# Patient Record
Sex: FEMALE | Race: WHITE | NOT HISPANIC OR LATINO | Employment: OTHER | URBAN - METROPOLITAN AREA
[De-identification: names, ages, dates, MRNs, and addresses within clinical notes are randomized per-mention and may not be internally consistent; named-entity substitution may affect disease eponyms.]

---

## 2017-06-13 ENCOUNTER — DOCUMENTATION ONLY (OUTPATIENT)
Dept: LAB | Facility: CLINIC | Age: 82
End: 2017-06-13

## 2017-06-13 DIAGNOSIS — D50.9 IRON DEFICIENCY ANEMIA, UNSPECIFIED IRON DEFICIENCY ANEMIA TYPE: ICD-10-CM

## 2017-06-13 DIAGNOSIS — I10 ESSENTIAL HYPERTENSION WITH GOAL BLOOD PRESSURE LESS THAN 140/90: ICD-10-CM

## 2017-06-13 DIAGNOSIS — C50.912 MALIGNANT NEOPLASM OF LEFT FEMALE BREAST, UNSPECIFIED SITE OF BREAST: ICD-10-CM

## 2017-06-13 DIAGNOSIS — E78.5 HYPERLIPIDEMIA LDL GOAL <130: ICD-10-CM

## 2017-06-13 DIAGNOSIS — Z00.00 ROUTINE GENERAL MEDICAL EXAMINATION AT A HEALTH CARE FACILITY: Primary | ICD-10-CM

## 2017-06-13 NOTE — PROGRESS NOTES
Please review, associate diagnosis, and sign pending pre physical lab orders for patient's upcoming 6/16/17 lab appointment.     Thank you,  Lab

## 2017-06-16 DIAGNOSIS — E78.5 HYPERLIPIDEMIA LDL GOAL <130: ICD-10-CM

## 2017-06-16 DIAGNOSIS — I10 ESSENTIAL HYPERTENSION WITH GOAL BLOOD PRESSURE LESS THAN 140/90: ICD-10-CM

## 2017-06-16 DIAGNOSIS — D50.9 IRON DEFICIENCY ANEMIA, UNSPECIFIED IRON DEFICIENCY ANEMIA TYPE: ICD-10-CM

## 2017-06-16 DIAGNOSIS — Z00.00 ROUTINE GENERAL MEDICAL EXAMINATION AT A HEALTH CARE FACILITY: ICD-10-CM

## 2017-06-16 DIAGNOSIS — C50.912 MALIGNANT NEOPLASM OF LEFT FEMALE BREAST, UNSPECIFIED SITE OF BREAST: ICD-10-CM

## 2017-06-16 LAB
ALBUMIN SERPL-MCNC: 3.7 G/DL (ref 3.4–5)
ALP SERPL-CCNC: 52 U/L (ref 40–150)
ALT SERPL W P-5'-P-CCNC: 24 U/L (ref 0–50)
ANION GAP SERPL CALCULATED.3IONS-SCNC: 9 MMOL/L (ref 3–14)
AST SERPL W P-5'-P-CCNC: 18 U/L (ref 0–45)
BASOPHILS # BLD AUTO: 0 10E9/L (ref 0–0.2)
BASOPHILS NFR BLD AUTO: 0.8 %
BILIRUB SERPL-MCNC: 0.5 MG/DL (ref 0.2–1.3)
BUN SERPL-MCNC: 12 MG/DL (ref 7–30)
CALCIUM SERPL-MCNC: 9.4 MG/DL (ref 8.5–10.1)
CHLORIDE SERPL-SCNC: 104 MMOL/L (ref 94–109)
CO2 SERPL-SCNC: 28 MMOL/L (ref 20–32)
CREAT SERPL-MCNC: 0.77 MG/DL (ref 0.52–1.04)
DIFFERENTIAL METHOD BLD: NORMAL
EOSINOPHIL # BLD AUTO: 0.5 10E9/L (ref 0–0.7)
EOSINOPHIL NFR BLD AUTO: 10.3 %
ERYTHROCYTE [DISTWIDTH] IN BLOOD BY AUTOMATED COUNT: 13.2 % (ref 10–15)
FERRITIN SERPL-MCNC: 45 NG/ML (ref 8–252)
GFR SERPL CREATININE-BSD FRML MDRD: 72 ML/MIN/1.7M2
GLUCOSE SERPL-MCNC: 102 MG/DL (ref 70–99)
HCT VFR BLD AUTO: 38.2 % (ref 35–47)
HGB BLD-MCNC: 12.2 G/DL (ref 11.7–15.7)
IRON SATN MFR SERPL: 20 % (ref 15–46)
IRON SERPL-MCNC: 55 UG/DL (ref 35–180)
LYMPHOCYTES # BLD AUTO: 1.2 10E9/L (ref 0.8–5.3)
LYMPHOCYTES NFR BLD AUTO: 24.3 %
MCH RBC QN AUTO: 31.1 PG (ref 26.5–33)
MCHC RBC AUTO-ENTMCNC: 31.9 G/DL (ref 31.5–36.5)
MCV RBC AUTO: 97 FL (ref 78–100)
MONOCYTES # BLD AUTO: 0.5 10E9/L (ref 0–1.3)
MONOCYTES NFR BLD AUTO: 10.7 %
NEUTROPHILS # BLD AUTO: 2.7 10E9/L (ref 1.6–8.3)
NEUTROPHILS NFR BLD AUTO: 53.9 %
PLATELET # BLD AUTO: 239 10E9/L (ref 150–450)
POTASSIUM SERPL-SCNC: 4.4 MMOL/L (ref 3.4–5.3)
PROT SERPL-MCNC: 7 G/DL (ref 6.8–8.8)
RBC # BLD AUTO: 3.92 10E12/L (ref 3.8–5.2)
SODIUM SERPL-SCNC: 141 MMOL/L (ref 133–144)
TIBC SERPL-MCNC: 281 UG/DL (ref 240–430)
WBC # BLD AUTO: 5 10E9/L (ref 4–11)

## 2017-06-16 PROCEDURE — 83550 IRON BINDING TEST: CPT | Performed by: INTERNAL MEDICINE

## 2017-06-16 PROCEDURE — 82728 ASSAY OF FERRITIN: CPT | Performed by: INTERNAL MEDICINE

## 2017-06-16 PROCEDURE — 36415 COLL VENOUS BLD VENIPUNCTURE: CPT | Performed by: INTERNAL MEDICINE

## 2017-06-16 PROCEDURE — 83540 ASSAY OF IRON: CPT | Performed by: INTERNAL MEDICINE

## 2017-06-16 PROCEDURE — 80061 LIPID PANEL: CPT | Performed by: INTERNAL MEDICINE

## 2017-06-16 PROCEDURE — 85025 COMPLETE CBC W/AUTO DIFF WBC: CPT | Performed by: INTERNAL MEDICINE

## 2017-06-16 PROCEDURE — 80053 COMPREHEN METABOLIC PANEL: CPT | Performed by: INTERNAL MEDICINE

## 2017-06-19 NOTE — PROGRESS NOTES
SUBJECTIVE:                                                            Aysha Rose is a 81 year old female who presents for Preventive Visit.    The patient feels fine and is working out reg by walking.  She has uri 2 weeks, cold, cough, not prod, no chest pain or shortness of breath, no f,c,s.  No ear pain or s.t but nasal moises, min dc, some facial pressure.    She otherwise feels fine, has been on ppi and iron for franklin found last year, added those then and follow up Sept stool neg for blood, iron fine.  Prior franklin and eval as noted.  NO gi c/o on review of systems.    She is up to date mammogram               Past Medical History:      Past Medical History:   Diagnosis Date     Abdominal pain 9/13    ct abd and pelvis neg     Breast cancer (H) 2007    lumpectomy and xrt [place], [facility] 5 years of arimidex     Erosive gastritis Oct 2011    by egd, colon as above, also small ulcer     High cholesterol      HTN (hypertension)     nl mr renogram     Hx of colonoscopy 2007, 2011    nl, 2011 with cecal angioect and 2mm polyp     FRANKLIN (iron deficiency anaemia) 2011, 2016    colonoscopy cecal angioectasia, egd with hh, small ulcer     Microscopic hematuria 2012    Dr. Alonzo, ct done 11/28/12 negative, cysto neg     Osteopenia 2008    [place], fu 2014 [facility] and [facility] -1.5 left hip     Syncope 2011    neg est echo             Past Surgical History:      Past Surgical History:   Procedure Laterality Date     CATARACT IOL, RT/LT       LUMPECTOMY BREAST  2007             Social History:     Social History     Social History     Marital status:      Spouse name: N/A     Number of children: 4     Years of education: N/A     Occupational History     homemaker      Social History Main Topics     Smoking status: Never Smoker     Smokeless tobacco: Never Used     Alcohol use No     Drug use: No     Sexual activity: Yes     Partners: Male     Other Topics Concern     Not on file     Social History Narrative             Family  "History:   reviewed         Allergies:     Allergies   Allergen Reactions     Penicillins              Medications:     Current Outpatient Prescriptions   Medication Sig Dispense Refill     Ferrous Sulfate (IRON SUPPLEMENT PO)        pantoprazole (PROTONIX) 40 MG enteric coated tablet TAKE 1 TABLET(40 MG) BY MOUTH DAILY 30 TO 60 MINUTES BEFORE A MEAL 30 tablet 6     simvastatin (ZOCOR) 20 MG tablet TAKE 1 TABLET BY MOUTH EVERY DAY IN THE EVENING 90 tablet 3     irbesartan-hydrochlorothiazide (AVALIDE) 150-12.5 MG per tablet Take 1 tablet by mouth daily 90 tablet 3     psyllium (METAMUCIL) 30.9 % POWD Take 3 tsp by mouth daily.       calcium-vitamin D (CALCIUM 600 + D) 600-400 MG-UNIT per tablet Take 3 tablets by mouth daily.       [DISCONTINUED] simvastatin (ZOCOR) 20 MG tablet Take 1 tablet (20 mg) by mouth At Bedtime 90 tablet 3               Review of Systems:   The 10 point Review of Systems is negative other than noted in the HPI           Physical Exam:   Blood pressure 120/67, pulse 74, temperature 97.7  F (36.5  C), temperature source Oral, height 5' 4.5\" (1.638 m), weight 150 lb (68 kg), SpO2 98 %, not currently breastfeeding.    Exam:  Constitutional: healthy appearing, alert and in no distress  Heent: Normocephalic. Head without obvious masses or lesions. PERRLDC, EOMI. Mouth exam within normal limits: tongue, mucous membranes, posterior pharynx all normal, no lesions or abnormalities seen.  Tm's and canals within normal limits bilaterally. Neck supple, no nuchal rigidity or masses. No supraclavicular, or cervical adenopathy. Thyroid symmetric, no masses.  Cardiovascular: Regular rate and rhythm, no murmer, rub or gallops.  JVP not elevated, no edema.  Carotids within normal limits bilaterally, no bruits.  Respiratory: Normal respiratory effort.  Lungs clear, normal flow, no wheezing or crackles.  Breasts: Normal bilaterally.  No masses or lesions.  Nipples within normal limits.  No axillary lesions or " nodes.  Gastrointestinal: Normal active bowel sounds.   Soft, not tender, no masses, guarding or rebound.  No hepatosplenomegaly.   Musculoskeletal: extremities normal, no gross deformities noted.  Skin: no suspicious lesions or rashes   Neurologic: Mental status within normal limits.  Speech fluent.  No gross motor abnormalities and gait intact.  Psychiatric: mentation appears normal and affect normal.         Data:   Labs reviewed with patient         Assessment:   1. Normal cpx  2. Consuelo, no signs malig cause, to stop iron and ppi and follow up labs 4 months  3. Uri, suspect viral, if worsens, fever or not gone soon call for ab  4. Breast ca, les  5. Hypertension, controlled  6. Elevated cholesterol on statin  7. Osteopenia, stable  8. hcm         Plan:   Up to date immunizations  Dc iron and ppi, follow up labs 4 months  Above re uri  Exercise, diet      Agustin Peguero M.D.              Are you in the first 12 months of your Medicare Part B coverage?  No    Healthy Habits:    Do you get at least three servings of calcium containing foods daily (dairy, green leafy vegetables, etc.)? yes    Amount of exercise or daily activities, outside of work: 7 day(s) per week    Problems taking medications regularly No    Medication side effects: No    Have you had an eye exam in the past two years? no    Do you see a dentist twice per year? yes    Do you have sleep apnea, excessive snoring or daytime drowsiness?yes    COGNITIVE SCREEN  1) Repeat 3 items (Banana, Sunrise, Chair)    2) Clock draw:   3) 3 item recall:   Results: 3 items recalled: COGNITIVE IMPAIRMENT LESS LIKELY    Mini-CogTM Copyright S Carlo[employer]. Licensed by the author for use in [facility]; reprinted with permission (horace@.[facility]). All rights reserved.                Reviewed and updated as needed this visit by clinical staff         Reviewed and updated as needed this visit by Provider        Social History   Substance Use Topics     Smoking status:  "Never Smoker     Smokeless tobacco: Never Used     Alcohol use No       The patient does not drink >3 drinks per day nor >7 drinks per week.    Today's PHQ-2 Score:   PHQ-2 ( 1999 Pfizer) 6/17/2016 6/9/2015   Q1: Little interest or pleasure in doing things 0 0   Q2: Feeling down, depressed or hopeless 0 0   PHQ-2 Score 0 0       Do you feel safe in your environment - Yes    Do you have a Health Care Directive?: Yes: Patient states has Advance Directive and will bring in a copy to clinic.    Current providers sharing in care for this patient include:   Patient Care Team:  Agustin Peguero MD as PCP - General (Internal Medicine)      Hearing impairment: No    Ability to successfully perform activities of daily living: Yes, no assistance needed     Fall risk:       Home safety:  none identified      The following health maintenance items are reviewed in Epic and correct as of today:  Health Maintenance   Topic Date Due     FALL RISK ASSESSMENT  06/17/2017     INFLUENZA VACCINE (SYSTEM ASSIGNED)  09/01/2017     ADVANCE DIRECTIVE PLANNING Q5 YRS  06/20/2019     TETANUS IMMUNIZATION (SYSTEM ASSIGNED)  09/18/2022     DEXA SCAN SCREENING (SYSTEM ASSIGNED)  Completed     PNEUMOCOCCAL  Completed            End of Life Planning:  Patient currently has an advanced directive: yes    COUNSELING:  Reviewed preventive health counseling, as reflected in patient instructions       Healthy diet/nutrition       Vision screening        Estimated body mass index is 25.5 kg/(m^2) as calculated from the following:    Height as of 6/17/16: 5' 4.5\" (1.638 m).    Weight as of 6/17/16: 150 lb 14.4 oz (68.4 kg).     reports that she has never smoked. She has never used smokeless tobacco.      Appropriate preventive services were discussed with this patient, including applicable screening as appropriate for cardiovascular disease, diabetes, osteopenia/osteoporosis, and glaucoma.  As appropriate for age/gender, discussed screening for " colorectal cancer, prostate cancer, breast cancer, and cervical cancer. Checklist reviewing preventive services available has been given to the patient.    Reviewed patients plan of care and provided an AVS. The Basic Care Plan (routine screening as documented in Health Maintenance) for Aysha meets the Care Plan requirement. This Care Plan has been established and reviewed with the Patient.    Counseling Resources:  ATP IV Guidelines  Pooled Cohorts Equation Calculator  Breast Cancer Risk Calculator  FRAX Risk Assessment  ICSI Preventive Guidelines  Dietary Guidelines for Americans, 2010  USDA's MyPlate  ASA Prophylaxis  Lung CA Screening    Agustin Peguero MD  [facility]

## 2017-06-19 NOTE — PATIENT INSTRUCTIONS
Stop both the iron and the protonix and make sure to come back for a non fasting lab in 4 months, call before you come.    If your cold worsens or you have a fever or it is not gone over the next 7 days call.    Agustin Peguero M.D.            Preventive Health Recommendations    Female Ages 65 +    Yearly exam:     See your health care provider every year in order to  o Review health changes.   o Discuss preventive care.    o Review your medicines if your doctor has prescribed any.      You no longer need a yearly Pap test unless you've had an abnormal Pap test in the past 10 years. If you have vaginal symptoms, such as bleeding or discharge, be sure to talk with your provider about a Pap test.      Every 1 to 2 years, have a mammogram.  If you are over 69, talk with your health care provider about whether or not you want to continue having screening mammograms.      Every 10 years, have a colonoscopy. Or, have a yearly FIT test (stool test). These exams will check for colon cancer.       Have a cholesterol test every 5 years, or more often if your doctor advises it.       Have a diabetes test (fasting glucose) every three years. If you are at risk for diabetes, you should have this test more often.       At age 65, have a bone density scan (DEXA) to check for osteoporosis (brittle bone disease).    Shots:    Get a flu shot each year.    Get a tetanus shot every 10 years.    Talk to your doctor about your pneumonia vaccines. There are now two you should receive - Pneumovax (PPSV 23) and Prevnar (PCV 13).    Talk to your doctor about the shingles vaccine.    Talk to your doctor about the hepatitis B vaccine.    Nutrition:     Eat at least 5 servings of fruits and vegetables each day.      Eat whole-grain bread, whole-wheat pasta and brown rice instead of white grains and rice.      Talk to your provider about Calcium and Vitamin D.     Lifestyle    Exercise at least 150 minutes a week (30 minutes a day, 5 days a  week). This will help you control your weight and prevent disease.      Limit alcohol to one drink per day.      No smoking.       Wear sunscreen to prevent skin cancer.       See your dentist twice a year for an exam and cleaning.      See your eye doctor every 1 to 2 years to screen for conditions such as glaucoma, macular degeneration and cataracts.

## 2017-06-20 ENCOUNTER — OFFICE VISIT (OUTPATIENT)
Dept: FAMILY MEDICINE | Facility: CLINIC | Age: 82
End: 2017-06-20
Payer: MEDICARE

## 2017-06-20 VITALS
BODY MASS INDEX: 24.99 KG/M2 | HEART RATE: 74 BPM | WEIGHT: 150 LBS | DIASTOLIC BLOOD PRESSURE: 67 MMHG | TEMPERATURE: 97.7 F | OXYGEN SATURATION: 98 % | HEIGHT: 65 IN | SYSTOLIC BLOOD PRESSURE: 120 MMHG

## 2017-06-20 DIAGNOSIS — M85.80 OSTEOPENIA: ICD-10-CM

## 2017-06-20 DIAGNOSIS — C50.912 MALIGNANT NEOPLASM OF LEFT FEMALE BREAST, UNSPECIFIED SITE OF BREAST: ICD-10-CM

## 2017-06-20 DIAGNOSIS — Z00.00 ROUTINE GENERAL MEDICAL EXAMINATION AT A HEALTH CARE FACILITY: Primary | ICD-10-CM

## 2017-06-20 DIAGNOSIS — D50.9 IRON DEFICIENCY ANEMIA, UNSPECIFIED IRON DEFICIENCY ANEMIA TYPE: ICD-10-CM

## 2017-06-20 DIAGNOSIS — E78.5 HYPERLIPIDEMIA LDL GOAL <130: ICD-10-CM

## 2017-06-20 DIAGNOSIS — I10 ESSENTIAL HYPERTENSION WITH GOAL BLOOD PRESSURE LESS THAN 140/90: ICD-10-CM

## 2017-06-20 PROCEDURE — G0439 PPPS, SUBSEQ VISIT: HCPCS | Performed by: INTERNAL MEDICINE

## 2017-06-20 RX ORDER — SIMVASTATIN 20 MG
TABLET ORAL
Qty: 90 TABLET | Refills: 3 | Status: SHIPPED | OUTPATIENT
Start: 2017-06-20 | End: 2018-06-26

## 2017-06-20 RX ORDER — IRBESARTAN AND HYDROCHLOROTHIAZIDE 150; 12.5 MG/1; MG/1
1 TABLET, FILM COATED ORAL DAILY
Qty: 90 TABLET | Refills: 3 | Status: SHIPPED | OUTPATIENT
Start: 2017-06-20 | End: 2017-07-14

## 2017-06-20 NOTE — NURSING NOTE
"Chief Complaint   Patient presents with     Medicare Visit       Initial /67  Pulse 74  Temp 97.7  F (36.5  C) (Oral)  Ht 5' 4.5\" (1.638 m)  Wt 150 lb (68 kg)  SpO2 98%  Breastfeeding? No  BMI 25.35 kg/m2 Estimated body mass index is 25.35 kg/(m^2) as calculated from the following:    Height as of this encounter: 5' 4.5\" (1.638 m).    Weight as of this encounter: 150 lb (68 kg).  Medication Reconciliation: complete   Elida TAYLOR CMA      "

## 2017-06-20 NOTE — MR AVS SNAPSHOT
After Visit Summary   6/20/2017    Aysha Rose    MRN: [id number]           Patient Information     Date Of Birth          1935        Visit Information        Provider Department      6/20/2017 11:00 AM Agustin Peguero MD [facility]        Today's Diagnoses     Routine general medical examination at a health care facility    -  1    Malignant neoplasm of left female breast, unspecified site of breast (H)        Iron deficiency anemia, unspecified iron deficiency anemia type        Osteopenia        Hyperlipidemia LDL goal <130        Essential hypertension with goal blood pressure less than 140/90          Care Instructions    Stop both the iron and the protonix and make sure to come back for a non fasting lab in 4 months, call before you come.    If your cold worsens or you have a fever or it is not gone over the next 7 days call.    Agustin Peguero M.D.            Preventive Health Recommendations    Female Ages 65 +    Yearly exam:     See your health care provider every year in order to  o Review health changes.   o Discuss preventive care.    o Review your medicines if your doctor has prescribed any.      You no longer need a yearly Pap test unless you've had an abnormal Pap test in the past 10 years. If you have vaginal symptoms, such as bleeding or discharge, be sure to talk with your provider about a Pap test.      Every 1 to 2 years, have a mammogram.  If you are over 69, talk with your health care provider about whether or not you want to continue having screening mammograms.      Every 10 years, have a colonoscopy. Or, have a yearly FIT test (stool test). These exams will check for colon cancer.       Have a cholesterol test every 5 years, or more often if your doctor advises it.       Have a diabetes test (fasting glucose) every three years. If you are at risk for diabetes, you should have this test more often.       At age 65, have a bone density scan (DEXA) to  check for osteoporosis (brittle bone disease).    Shots:    Get a flu shot each year.    Get a tetanus shot every 10 years.    Talk to your doctor about your pneumonia vaccines. There are now two you should receive - Pneumovax (PPSV 23) and Prevnar (PCV 13).    Talk to your doctor about the shingles vaccine.    Talk to your doctor about the hepatitis B vaccine.    Nutrition:     Eat at least 5 servings of fruits and vegetables each day.      Eat whole-grain bread, whole-wheat pasta and brown rice instead of white grains and rice.      Talk to your provider about Calcium and Vitamin D.     Lifestyle    Exercise at least 150 minutes a week (30 minutes a day, 5 days a week). This will help you control your weight and prevent disease.      Limit alcohol to one drink per day.      No smoking.       Wear sunscreen to prevent skin cancer.       See your dentist twice a year for an exam and cleaning.      See your eye doctor every 1 to 2 years to screen for conditions such as glaucoma, macular degeneration and cataracts.          Follow-ups after your visit        Future tests that were ordered for you today     Open Future Orders        Priority Expected Expires Ordered    Hemoglobin Routine  12/17/2017 6/20/2017    Ferritin Routine  9/18/2017 6/20/2017    Iron and iron binding capacity Routine  9/18/2017 6/20/2017            Who to contact     If you have questions or need follow up information about today's clinic visit or your schedule please contact [facility] directly at [phone].  Normal or non-critical lab and imaging results will be communicated to you by MyChart, letter or phone within 4 business days after the clinic has received the results. If you do not hear from us within 7 days, please contact the clinic through MyChart or phone. If you have a critical or abnormal lab result, we will notify you by phone as soon as possible.  Submit refill requests through [employer] or call your pharmacy and  "they will forward the refill request to us. Please allow 3 business days for your refill to be completed.          Additional Information About Your Visit        Drikhart Information     [employer] lets you send messages to your doctor, view your test results, renew your prescriptions, schedule appointments and more. To sign up, go to [email or web address] . Click on \"Log in\" on the left side of the screen, which will take you to the Welcome page. Then click on \"Sign up Now\" on the right side of the page.     You will be asked to enter the access code listed below, as well as some personal information. Please follow the directions to create your username and password.     Your access code is: [email or web address]  Expires: 2017 11:02 AM     Your access code will  in 90 days. If you need help or a new code, please call your [facility] clinic or [phone].        Care EveryWhere ID     This is your [facility] EveryWhere ID. This could be used by other organizations to access your [facility] medical records  [id number]        Your Vitals Were     Pulse Temperature Height Pulse Oximetry Breastfeeding? BMI (Body Mass Index)    74 97.7  F (36.5  C) (Oral) 5' 4.5\" (1.638 m) 98% No 25.35 kg/m2       Blood Pressure from Last 3 Encounters:   17 120/67   16 116/56   06/09/15 113/63    Weight from Last 3 Encounters:   17 150 lb (68 kg)   16 150 lb 14.4 oz (68.4 kg)   06/09/15 143 lb 8 oz (65.1 kg)                 Today's Medication Changes          These changes are accurate as of: 17 11:14 AM.  If you have any questions, ask your nurse or doctor.               These medicines have changed or have updated prescriptions.        Dose/Directions    simvastatin 20 MG tablet   Commonly known as:  ZOCOR   This may have changed:  See the new instructions.   Used for:  Hyperlipidemia LDL goal <130   Changed by:  Agustin Peguero MD        TAKE 1 TABLET BY MOUTH EVERY DAY IN THE EVENING   Quantity:  90 " tablet   Refills:  3            Where to get your medicines      These medications were sent to [facility] - [place], [place] - [address] [place] [facility] [address]  [address] [address]     Phone:  [phone]     irbesartan-hydrochlorothiazide 150-12.5 MG per tablet    simvastatin 20 MG tablet                Primary Care Provider Office Phone # Fax #    Agustin Peguero [phone]       [facility] [address]  [place] [address]        Thank you!     Thank you for choosing [facility]  for your care. Our goal is always to provide you with excellent care. Hearing back from our patients is one way we can continue to improve our services. Please take a few minutes to complete the written survey that you may receive in the mail after your visit with us. Thank you!             Your Updated Medication List - Protect others around you: Learn how to safely use, store and throw away your medicines at www.disposemymeds.org.          This list is accurate as of: 6/20/17 11:14 AM.  Always use your most recent med list.                   Brand Name Dispense Instructions for use    calcium 600 + D 600-400 MG-UNIT per tablet   Generic drug:  calcium-vitamin D      Take 3 tablets by mouth daily.       irbesartan-hydrochlorothiazide 150-12.5 MG per tablet    AVALIDE    90 tablet    Take 1 tablet by mouth daily       IRON SUPPLEMENT PO          METAMUCIL 30.9 % Powd   Generic drug:  psyllium      Take 3 tsp by mouth daily.       pantoprazole 40 MG EC tablet    PROTONIX    30 tablet    TAKE 1 TABLET(40 MG) BY MOUTH DAILY 30 TO 60 MINUTES BEFORE A MEAL       simvastatin 20 MG tablet    ZOCOR    90 tablet    TAKE 1 TABLET BY MOUTH EVERY DAY IN THE EVENING

## 2017-06-21 DIAGNOSIS — K29.60 EROSIVE GASTRITIS: ICD-10-CM

## 2017-06-21 RX ORDER — PANTOPRAZOLE SODIUM 40 MG/1
TABLET, DELAYED RELEASE ORAL
Qty: 90 TABLET | Refills: 3 | Status: SHIPPED | OUTPATIENT
Start: 2017-06-21 | End: 2017-09-13

## 2017-06-21 NOTE — TELEPHONE ENCOUNTER
Prescription approved per [facility] Refill [facility].  Jessica Newberry RN

## 2017-06-29 LAB
CHOLEST SERPL-MCNC: 165 MG/DL
HDLC SERPL-MCNC: 66 MG/DL
LDLC SERPL CALC-MCNC: 86 MG/DL (ref 0–130)
NONHDLC SERPL-MCNC: 99 MG/DL
TRIGL SERPL-MCNC: 67 MG/DL

## 2017-07-10 DIAGNOSIS — I10 ESSENTIAL HYPERTENSION WITH GOAL BLOOD PRESSURE LESS THAN 140/90: ICD-10-CM

## 2017-07-11 RX ORDER — IRBESARTAN AND HYDROCHLOROTHIAZIDE 150; 12.5 MG/1; MG/1
TABLET, FILM COATED ORAL
Qty: 90 TABLET | Refills: 0 | OUTPATIENT
Start: 2017-07-11

## 2017-07-14 ENCOUNTER — TELEPHONE (OUTPATIENT)
Dept: FAMILY MEDICINE | Facility: CLINIC | Age: 82
End: 2017-07-14

## 2017-07-14 DIAGNOSIS — I10 ESSENTIAL HYPERTENSION WITH GOAL BLOOD PRESSURE LESS THAN 140/90: ICD-10-CM

## 2017-07-14 RX ORDER — IRBESARTAN AND HYDROCHLOROTHIAZIDE 150; 12.5 MG/1; MG/1
1 TABLET, FILM COATED ORAL DAILY
Qty: 90 TABLET | Refills: 3 | Status: SHIPPED | OUTPATIENT
Start: 2017-07-14 | End: 2018-06-26

## 2017-07-14 NOTE — TELEPHONE ENCOUNTER
Reason for Call:  Other prescription    Detailed comments: [facility] is calling stating that they never recived a prescription that was sent 6/20/17. Pt is there now trying to , but there is nothing in their system.   irbesartan-hydrochlorothiazide (AVALIDE) 150-12.5 MG per tablet 90 tablet   [facility] fax # is [phone]      Call taken on 7/14/2017 at 9:48 AM by Juliet Jackson

## 2017-09-13 ENCOUNTER — OFFICE VISIT (OUTPATIENT)
Dept: FAMILY MEDICINE | Facility: CLINIC | Age: 82
End: 2017-09-13
Payer: MEDICARE

## 2017-09-13 VITALS
TEMPERATURE: 98.8 F | HEIGHT: 65 IN | HEART RATE: 76 BPM | RESPIRATION RATE: 16 BRPM | DIASTOLIC BLOOD PRESSURE: 67 MMHG | BODY MASS INDEX: 22.16 KG/M2 | SYSTOLIC BLOOD PRESSURE: 130 MMHG | WEIGHT: 133 LBS | OXYGEN SATURATION: 98 %

## 2017-09-13 DIAGNOSIS — H61.22 IMPACTED CERUMEN OF LEFT EAR: Primary | ICD-10-CM

## 2017-09-13 PROCEDURE — 99212 OFFICE O/P EST SF 10 MIN: CPT | Mod: 25 | Performed by: NURSE PRACTITIONER

## 2017-09-13 PROCEDURE — 69209 REMOVE IMPACTED EAR WAX UNI: CPT | Performed by: NURSE PRACTITIONER

## 2017-09-13 NOTE — NURSING NOTE
Aysha Rose is a 81 year old female who presents today for Ear Wash. with the complaint of wax.    Ear exam showing wax occlusion in the left ear.  hydrogen peroxide/warm water mixture drops were placed in left ear(s) and let soak for 1 minutes.  The patients ear(s) were irrigated using a syringe with mild amount of wax extracted.  Patient tolerated procedure well.    Patient instructed to irrigate ears with warm water daily.    Outcome:Ear Clear

## 2017-09-13 NOTE — NURSING NOTE
"Chief Complaint   Patient presents with     Cerumen Impaction     Left ear-No pain but did try Qtip       Initial /67 (BP Location: Right arm, Patient Position: Chair, Cuff Size: Adult Small)  Pulse 76  Temp 98.8  F (37.1  C) (Tympanic)  Resp 16  Ht 5' 4.5\" (1.638 m)  Wt 133 lb (60.3 kg)  SpO2 98%  BMI 22.48 kg/m2 Estimated body mass index is 22.48 kg/(m^2) as calculated from the following:    Height as of this encounter: 5' 4.5\" (1.638 m).    Weight as of this encounter: 133 lb (60.3 kg).  Medication Reconciliation: complete   Elle Vanessa CMA (AAMA)      "

## 2017-09-13 NOTE — MR AVS SNAPSHOT
"              After Visit Summary   2017    Aysha Rose    MRN: [id number]           Patient Information     Date Of Birth          1935        Visit Information        Provider Department      2017 2:00 PM Genesis Amaral APRN CNP [facility]        Today's Diagnoses     Impacted cerumen of left ear    -  1       Follow-ups after your visit        Who to contact     If you have questions or need follow up information about today's clinic visit or your schedule please contact [facility] directly at [phone].  Normal or non-critical lab and imaging results will be communicated to you by [employer], letter or phone within 4 business days after the clinic has received the results. If you do not hear from us within 7 days, please contact the clinic through [employer] or phone. If you have a critical or abnormal lab result, we will notify you by phone as soon as possible.  Submit refill requests through [employer] or call your pharmacy and they will forward the refill request to us. Please allow 3 business days for your refill to be completed.          Additional Information About Your Visit        MyChart Information     [employer] lets you send messages to your doctor, view your test results, renew your prescriptions, schedule appointments and more. To sign up, go to [email or web address] . Click on \"Log in\" on the left side of the screen, which will take you to the Welcome page. Then click on \"Sign up Now\" on the right side of the page.     You will be asked to enter the access code listed below, as well as some personal information. Please follow the directions to create your username and password.     Your access code is: [email or web address]  Expires: 2017  8:33 AM     Your access code will  in 90 days. If you need help or a new code, please call your [facility] or [phone].        Care EveryWhere ID     This is your Care EveryWhere ID. This could be used by " "other organizations to access your [place] medical records  [id number]        Your Vitals Were     Pulse Temperature Respirations Height Pulse Oximetry BMI (Body Mass Index)    76 98.8  F (37.1  C) (Tympanic) 16 5' 4.5\" (1.638 m) 98% 22.48 kg/m2       Blood Pressure from Last 3 Encounters:   09/13/17 130/67   06/20/17 120/67   06/17/16 116/56    Weight from Last 3 Encounters:   09/13/17 133 lb (60.3 kg)   06/20/17 150 lb (68 kg)   06/17/16 150 lb 14.4 oz (68.4 kg)              We Performed the Following     HC REMOVAL IMPACTED CERUMEN IRRIGATION/LVG UNILAT          Today's Medication Changes          These changes are accurate as of: 9/13/17 11:59 PM.  If you have any questions, ask your nurse or doctor.               Stop taking these medicines if you haven't already. Please contact your care team if you have questions.     IRON SUPPLEMENT PO   Stopped by:  Genesis Amaral APRN CNP           pantoprazole 40 MG EC tablet   Commonly known as:  PROTONIX   Stopped by:  Genesis Amaral APRN CNP                    Primary Care Provider Office Phone # Fax #    Agustin Elie Peguero [phone] [address] [address]        Equal Access to Services     [facility]: Hadii whitney mcintyre hadmayelino Sovadim, waaxda luqadaha, qaybta kaalmada yeny, florentin neff . So [facility] [phone].    ATENCIÓN: Si habla español, tiene a john disposición servicios gratuitos de asistencia lingüística. Jacoby al [phone].    We comply with applicable federal civil rights laws and [place] laws. We do not discriminate on the basis of race, color, national origin, age, disability sex, sexual orientation or gender identity.            Thank you!     Thank you for choosing [facility]  for your care. Our goal is always to provide you with excellent care. Hearing back from our patients is one way we can continue to improve our services. Please take a few " minutes to complete the written survey that you may receive in the mail after your visit with us. Thank you!             Your Updated Medication List - Protect others around you: Learn how to safely use, store and throw away your medicines at www.disposemymeds.org.          This list is accurate as of: 9/13/17 11:59 PM.  Always use your most recent med list.                   Brand Name Dispense Instructions for use Diagnosis    calcium 600 + D 600-400 MG-UNIT per tablet   Generic drug:  calcium-vitamin D      Take 3 tablets by mouth daily.        irbesartan-hydrochlorothiazide 150-12.5 MG per tablet    AVALIDE    90 tablet    Take 1 tablet by mouth daily    Essential hypertension with goal blood pressure less than 140/90       METAMUCIL 30.9 % Powd   Generic drug:  psyllium      Take 3 tsp by mouth daily.        simvastatin 20 MG tablet    ZOCOR    90 tablet    TAKE 1 TABLET BY MOUTH EVERY DAY IN THE EVENING    Hyperlipidemia LDL goal <130

## 2017-10-02 DIAGNOSIS — D50.9 IRON DEFICIENCY ANEMIA, UNSPECIFIED IRON DEFICIENCY ANEMIA TYPE: ICD-10-CM

## 2017-10-02 LAB
FERRITIN SERPL-MCNC: 8 NG/ML (ref 8–252)
HGB BLD-MCNC: 12.3 G/DL (ref 11.7–15.7)
IRON SATN MFR SERPL: 19 % (ref 15–46)
IRON SERPL-MCNC: 66 UG/DL (ref 35–180)
TIBC SERPL-MCNC: 354 UG/DL (ref 240–430)

## 2017-10-02 PROCEDURE — 83550 IRON BINDING TEST: CPT | Performed by: INTERNAL MEDICINE

## 2017-10-02 PROCEDURE — 82728 ASSAY OF FERRITIN: CPT | Performed by: INTERNAL MEDICINE

## 2017-10-02 PROCEDURE — 83540 ASSAY OF IRON: CPT | Performed by: INTERNAL MEDICINE

## 2017-10-02 PROCEDURE — 36415 COLL VENOUS BLD VENIPUNCTURE: CPT | Performed by: INTERNAL MEDICINE

## 2017-10-02 PROCEDURE — 85018 HEMOGLOBIN: CPT | Performed by: INTERNAL MEDICINE

## 2017-10-02 NOTE — LETTER
[facility]  [address] [facility]  [address]  [place]  [address]  Tel: [phone]    October 3, 2017    Aysha YUNI Rose  [address] [facility] [address]        Dear Ms. Rose,    Your iron levels have dropped a bit off of the iron.  I would like you to go back on it as you were before.  If you are having any gi symptoms such as stomach or bowel problems let me know.  Please then come back in 3 months for a repeat non fasting lab.    If you have any further questions or problems, please contact our office.      Sincerely,    Agustin Peguero MD/ Elida TAYLOR, CMA  Results for orders placed or performed in visit on 10/02/17   Hemoglobin   Result Value Ref Range    Hemoglobin 12.3 11.7 - 15.7 g/dL   Ferritin   Result Value Ref Range    Ferritin 8 8 - 252 ng/mL   Iron and iron binding capacity   Result Value Ref Range    Iron 66 35 - 180 ug/dL    Iron Binding Cap 354 240 - 430 ug/dL    Iron Saturation Index 19 15 - 46 %               Enclosure: Lab Results

## 2017-10-03 DIAGNOSIS — D50.9 IRON DEFICIENCY ANEMIA, UNSPECIFIED IRON DEFICIENCY ANEMIA TYPE: Primary | ICD-10-CM

## 2017-10-03 NOTE — PROGRESS NOTES
Your iron levels have dropped a bit off of the iron.  I would like you to go back on it as you were before.  If you are having any gi symptoms such as stomach or bowel problems let me know.  Please then come back in 3 months for a repeat non fasting lab.      If you have any questions please call me.

## 2018-01-31 DIAGNOSIS — D50.9 IRON DEFICIENCY ANEMIA, UNSPECIFIED IRON DEFICIENCY ANEMIA TYPE: ICD-10-CM

## 2018-01-31 LAB — HGB BLD-MCNC: 13.2 G/DL (ref 11.7–15.7)

## 2018-01-31 PROCEDURE — 36415 COLL VENOUS BLD VENIPUNCTURE: CPT | Performed by: INTERNAL MEDICINE

## 2018-01-31 PROCEDURE — 85018 HEMOGLOBIN: CPT | Performed by: INTERNAL MEDICINE

## 2018-06-19 DIAGNOSIS — Z00.00 ROUTINE GENERAL MEDICAL EXAMINATION AT A HEALTH CARE FACILITY: ICD-10-CM

## 2018-06-19 DIAGNOSIS — I10 ESSENTIAL HYPERTENSION WITH GOAL BLOOD PRESSURE LESS THAN 140/90: ICD-10-CM

## 2018-06-19 DIAGNOSIS — C50.912 MALIGNANT NEOPLASM OF LEFT FEMALE BREAST, UNSPECIFIED ESTROGEN RECEPTOR STATUS, UNSPECIFIED SITE OF BREAST (H): ICD-10-CM

## 2018-06-19 DIAGNOSIS — D50.9 IRON DEFICIENCY ANEMIA, UNSPECIFIED IRON DEFICIENCY ANEMIA TYPE: ICD-10-CM

## 2018-06-19 LAB
ALBUMIN SERPL-MCNC: 3.5 G/DL (ref 3.4–5)
ALP SERPL-CCNC: 42 U/L (ref 40–150)
ALT SERPL W P-5'-P-CCNC: 25 U/L (ref 0–50)
ANION GAP SERPL CALCULATED.3IONS-SCNC: 6 MMOL/L (ref 3–14)
AST SERPL W P-5'-P-CCNC: 16 U/L (ref 0–45)
BILIRUB SERPL-MCNC: 0.6 MG/DL (ref 0.2–1.3)
BUN SERPL-MCNC: 19 MG/DL (ref 7–30)
CALCIUM SERPL-MCNC: 9.9 MG/DL (ref 8.5–10.1)
CHLORIDE SERPL-SCNC: 106 MMOL/L (ref 94–109)
CHOLEST SERPL-MCNC: 177 MG/DL
CO2 SERPL-SCNC: 31 MMOL/L (ref 20–32)
CREAT SERPL-MCNC: 0.76 MG/DL (ref 0.52–1.04)
ERYTHROCYTE [DISTWIDTH] IN BLOOD BY AUTOMATED COUNT: 12.7 % (ref 10–15)
FERRITIN SERPL-MCNC: 38 NG/ML (ref 8–252)
GFR SERPL CREATININE-BSD FRML MDRD: 72 ML/MIN/1.7M2
GLUCOSE SERPL-MCNC: 87 MG/DL (ref 70–99)
HCT VFR BLD AUTO: 41.1 % (ref 35–47)
HDLC SERPL-MCNC: 58 MG/DL
HGB BLD-MCNC: 13.4 G/DL (ref 11.7–15.7)
IRON SATN MFR SERPL: 43 % (ref 15–46)
IRON SERPL-MCNC: 132 UG/DL (ref 35–180)
LDLC SERPL CALC-MCNC: 99 MG/DL
MCH RBC QN AUTO: 32.3 PG (ref 26.5–33)
MCHC RBC AUTO-ENTMCNC: 32.6 G/DL (ref 31.5–36.5)
MCV RBC AUTO: 99 FL (ref 78–100)
NONHDLC SERPL-MCNC: 119 MG/DL
PLATELET # BLD AUTO: 220 10E9/L (ref 150–450)
POTASSIUM SERPL-SCNC: 4.1 MMOL/L (ref 3.4–5.3)
PROT SERPL-MCNC: 6.8 G/DL (ref 6.8–8.8)
RBC # BLD AUTO: 4.15 10E12/L (ref 3.8–5.2)
SODIUM SERPL-SCNC: 143 MMOL/L (ref 133–144)
TIBC SERPL-MCNC: 304 UG/DL (ref 240–430)
TRIGL SERPL-MCNC: 102 MG/DL
WBC # BLD AUTO: 5.2 10E9/L (ref 4–11)

## 2018-06-19 PROCEDURE — 36415 COLL VENOUS BLD VENIPUNCTURE: CPT | Performed by: INTERNAL MEDICINE

## 2018-06-19 PROCEDURE — 80061 LIPID PANEL: CPT | Performed by: INTERNAL MEDICINE

## 2018-06-19 PROCEDURE — 80053 COMPREHEN METABOLIC PANEL: CPT | Performed by: INTERNAL MEDICINE

## 2018-06-19 PROCEDURE — 83540 ASSAY OF IRON: CPT | Performed by: INTERNAL MEDICINE

## 2018-06-19 PROCEDURE — 82728 ASSAY OF FERRITIN: CPT | Performed by: INTERNAL MEDICINE

## 2018-06-19 PROCEDURE — 83550 IRON BINDING TEST: CPT | Performed by: INTERNAL MEDICINE

## 2018-06-19 PROCEDURE — 85027 COMPLETE CBC AUTOMATED: CPT | Performed by: INTERNAL MEDICINE

## 2018-06-26 ENCOUNTER — OFFICE VISIT (OUTPATIENT)
Dept: FAMILY MEDICINE | Facility: CLINIC | Age: 83
End: 2018-06-26
Payer: MEDICARE

## 2018-06-26 ENCOUNTER — HOSPITAL ENCOUNTER (OUTPATIENT)
Dept: BONE DENSITY | Facility: CLINIC | Age: 83
Discharge: HOME | End: 2018-06-26
Attending: INTERNAL MEDICINE | Admitting: INTERNAL MEDICINE
Payer: MEDICARE

## 2018-06-26 VITALS
OXYGEN SATURATION: 96 % | HEIGHT: 65 IN | HEART RATE: 68 BPM | SYSTOLIC BLOOD PRESSURE: 129 MMHG | WEIGHT: 133 LBS | DIASTOLIC BLOOD PRESSURE: 67 MMHG | TEMPERATURE: 97.8 F | BODY MASS INDEX: 22.16 KG/M2

## 2018-06-26 DIAGNOSIS — Z00.00 ROUTINE GENERAL MEDICAL EXAMINATION AT A HEALTH CARE FACILITY: Primary | ICD-10-CM

## 2018-06-26 DIAGNOSIS — C50.912 MALIGNANT NEOPLASM OF LEFT FEMALE BREAST, UNSPECIFIED ESTROGEN RECEPTOR STATUS, UNSPECIFIED SITE OF BREAST (H): ICD-10-CM

## 2018-06-26 DIAGNOSIS — M85.80 OSTEOPENIA, UNSPECIFIED LOCATION: ICD-10-CM

## 2018-06-26 DIAGNOSIS — Z78.0 POSTMENOPAUSAL STATUS: ICD-10-CM

## 2018-06-26 DIAGNOSIS — E78.5 HYPERLIPIDEMIA LDL GOAL <130: ICD-10-CM

## 2018-06-26 DIAGNOSIS — I10 ESSENTIAL HYPERTENSION WITH GOAL BLOOD PRESSURE LESS THAN 140/90: ICD-10-CM

## 2018-06-26 DIAGNOSIS — K29.60 EROSIVE GASTRITIS: ICD-10-CM

## 2018-06-26 DIAGNOSIS — D50.9 IRON DEFICIENCY ANEMIA, UNSPECIFIED IRON DEFICIENCY ANEMIA TYPE: ICD-10-CM

## 2018-06-26 PROCEDURE — G0439 PPPS, SUBSEQ VISIT: HCPCS | Performed by: INTERNAL MEDICINE

## 2018-06-26 PROCEDURE — 77080 DXA BONE DENSITY AXIAL: CPT

## 2018-06-26 RX ORDER — FERROUS SULFATE 325(65) MG
325 TABLET ORAL EVERY OTHER DAY
Status: ON HOLD | COMMUNITY
End: 2019-03-14

## 2018-06-26 RX ORDER — SIMVASTATIN 20 MG
TABLET ORAL
Qty: 90 TABLET | Refills: 3 | Status: SHIPPED | OUTPATIENT
Start: 2018-06-26 | End: 2019-07-05

## 2018-06-26 RX ORDER — IRBESARTAN AND HYDROCHLOROTHIAZIDE 150; 12.5 MG/1; MG/1
1 TABLET, FILM COATED ORAL DAILY
Qty: 90 TABLET | Refills: 3 | Status: SHIPPED | OUTPATIENT
Start: 2018-06-26 | End: 2019-07-01

## 2018-06-26 NOTE — PROGRESS NOTES
SUBJECTIVE:   Aysha Rose is a 82 year old female who presents for Preventive Visit.    She is doing well and works out reg.  Just at [facility] for exam and mammogram and neg, les.  NO c/o.               Past Medical History:      Past Medical History:   Diagnosis Date     Abdominal pain 9/13    ct abd and pelvis neg     Breast cancer (H) 2007    lumpectomy and xrt [facility], got 5 years of arimidex     Erosive gastritis Oct 2011    by egd, colon as above, also small ulcer     High cholesterol      HTN (hypertension)     nl mr renogram     Hx of colonoscopy 2007, 2011    nl, 2011 with cecal angioect and 2mm polyp     FRANKLIN (iron deficiency anaemia) 2011, 2016 2011 colonoscopy cecal angioectasia, egd with hh, small ulcer     Microscopic hematuria 2012    Dr. Alonzo, ct done 11/28/12 negative, cysto neg     Osteopenia 2008    [facility],  [facility] -1.5 left hip     Syncope 2011    neg est echo             Past Surgical History:      Past Surgical History:   Procedure Laterality Date     CATARACT IOL, RT/LT       LUMPECTOMY BREAST  2007             Social History:     Social History     Social History     Marital status:      Spouse name: N/A     Number of children: 4     Years of education: N/A     Occupational History     homemaker      Social History Main Topics     Smoking status: Never Smoker     Smokeless tobacco: Never Used     Alcohol use No     Drug use: No     Sexual activity: Yes     Partners: Male     Other Topics Concern     Not on file     Social History Narrative             Family History:   reviewed         Allergies:     Allergies   Allergen Reactions     Penicillins              Medications:     Current Outpatient Prescriptions   Medication Sig Dispense Refill     Ascorbic Acid (VITAMIN C PO)        calcium-vitamin D (CALCIUM 600 + D) 600-400 MG-UNIT per tablet Take 3 tablets by mouth daily.       ferrous sulfate (IRON) 325 (65 Fe) MG tablet Take 325 mg by mouth every other day         "irbesartan-hydrochlorothiazide (AVALIDE) 150-12.5 MG per tablet Take 1 tablet by mouth daily 90 tablet 3     psyllium (METAMUCIL) 30.9 % POWD Take 3 tsp by mouth daily.       simvastatin (ZOCOR) 20 MG tablet TAKE 1 TABLET BY MOUTH EVERY DAY IN THE EVENING 90 tablet 3     [DISCONTINUED] irbesartan-hydrochlorothiazide (AVALIDE) 150-12.5 MG per tablet Take 1 tablet by mouth daily 90 tablet 3     [DISCONTINUED] simvastatin (ZOCOR) 20 MG tablet TAKE 1 TABLET BY MOUTH EVERY DAY IN THE EVENING 90 tablet 3               Review of Systems:   The 10 point Review of Systems is negative other than noted in the HPI           Physical Exam:   Blood pressure 129/67, pulse 68, temperature 97.8  F (36.6  C), temperature source Oral, height 5' 4.5\" (1.638 m), weight 133 lb (60.3 kg), SpO2 96 %, not currently breastfeeding.    Exam:  Constitutional: healthy appearing, alert and in no distress  Heent: Normocephalic. Head without obvious masses or lesions. PERRLDC, EOMI. Mouth exam within normal limits: tongue, mucous membranes, posterior pharynx all normal, no lesions or abnormalities seen.  Tm's and canals within normal limits bilaterally. Neck supple, no nuchal rigidity or masses. No supraclavicular, or cervical adenopathy. Thyroid symmetric, no masses.  Cardiovascular: Regular rate and rhythm, no murmer, rub or gallops.  JVP not elevated, no edema.  Carotids within normal limits bilaterally, no bruits.  Respiratory: Normal respiratory effort.  Lungs clear, normal flow, no wheezing or crackles.  Gastrointestinal: Normal active bowel sounds.   Soft, not tender, no masses, guarding or rebound.  No hepatosplenomegaly.   Musculoskeletal: extremities normal, no gross deformities noted.  Skin: no suspicious lesions or rashes   Neurologic: Mental status within normal limits.  Speech fluent.  No gross motor abnormalities and gait intact.  Psychiatric: mentation appears normal and affect normal.         Data:   Labs reviewed with patient      "    Assessment:   1. Normal complete physical exam  2. Breast ca, les  3. Consuelo, no issues, cont qod iron  4. Elevated cholesterol on statin  5. Hypertension, controlled  6. Gastritis, no gi c/o  7. Osteopenia, to get dexa  8. hcm         Plan:   shinrix at pharm  dexa  Exercise, diet  Call if problems      Agustin Peguero M.D.                Are you in the first 12 months of your Medicare Part B coverage?  No    Healthy Habits:    Do you get at least three servings of calcium containing foods daily (dairy, green leafy vegetables, etc.)? yes    Amount of exercise or daily activities, outside of work: 6 day(s) per week    Problems taking medications regularly No    Medication side effects: No    Have you had an eye exam in the past two years? yes    Do you see a dentist twice per year? yes    Do you have sleep apnea, excessive snoring or daytime drowsiness?no      Ability to successfully perform activities of daily living: Yes, no assistance needed    Home safety:  none identified     Hearing impairment: No    Fall risk:           COGNITIVE SCREEN  1) Repeat 3 items (Leader, Season, Table)    2) Clock draw:   3) 3 item recall: Recalls 3 objects  Results: 3 items recalled: COGNITIVE IMPAIRMENT LESS LIKELY    Mini-CogTM Copyright S Carloz. Licensed by the author for use in [facility]; reprinted with permission (soob@[facility]). All rights reserved.                Reviewed and updated as needed this visit by clinical staff         Reviewed and updated as needed this visit by Provider        Social History   Substance Use Topics     Smoking status: Never Smoker     Smokeless tobacco: Never Used     Alcohol use No       If you drink alcohol do you typically have >3 drinks per day or >7 drinks per week? No                        Today's PHQ-2 Score:   PHQ-2 ( 1999 Pfizer) 6/20/2017 6/17/2016   Q1: Little interest or pleasure in doing things 0 0   Q2: Feeling down, depressed or hopeless 0 0   PHQ-2 Score 0 0       Do  "you feel safe in your environment - Yes    Do you have a Health Care Directive?: Yes: Patient states has Advance Directive and will bring in a copy to clinic.    Current providers sharing in care for this patient include:   Patient Care Team:  Agustin Peguero MD as PCP - General (Internal Medicine)    The following health maintenance items are reviewed in Epic and correct as of today:  Health Maintenance   Topic Date Due     FALL RISK ASSESSMENT  06/20/2018     PHQ-2 Q1 YR  06/20/2018     INFLUENZA VACCINE (Season Ended) 09/01/2018     ADVANCE DIRECTIVE PLANNING Q5 YRS  06/20/2019     TETANUS IMMUNIZATION (SYSTEM ASSIGNED)  09/18/2022     DEXA SCAN SCREENING (SYSTEM ASSIGNED)  Completed     PNEUMOCOCCAL  Completed       End of Life Planning:  Patient currently has an advanced directive: yes    COUNSELING:  Reviewed preventive health counseling, as reflected in patient instructions       Regular exercise       Healthy diet/nutrition    BP Readings from Last 1 Encounters:   09/13/17 130/67     Estimated body mass index is 22.48 kg/(m^2) as calculated from the following:    Height as of 9/13/17: 5' 4.5\" (1.638 m).    Weight as of 9/13/17: 133 lb (60.3 kg).           reports that she has never smoked. She has never used smokeless tobacco.      Appropriate preventive services were discussed with this patient, including applicable screening as appropriate for cardiovascular disease, diabetes, osteopenia/osteoporosis, and glaucoma.  As appropriate for age/gender, discussed screening for colorectal cancer, prostate cancer, breast cancer, and cervical cancer. Checklist reviewing preventive services available has been given to the patient.    Reviewed patients plan of care and provided an AVS. The Basic Care Plan (routine screening as documented in Health Maintenance) for Aysha meets the Care Plan requirement. This Care Plan has been established and reviewed with the Patient.    Counseling Resources:  ATP IV " Guidelines  Pooled Cohorts Equation Calculator  Breast Cancer Risk Calculator  FRAX Risk Assessment  ICSI Preventive Guidelines  Dietary Guidelines for Americans, 2010  YASSSU's MyPlate  ASA Prophylaxis  Lung CA Screening    Agustin Peguero MD  [facility]

## 2018-06-26 NOTE — MR AVS SNAPSHOT
After Visit Summary   6/26/2018    Aysha Rose    MRN: [id number]           Patient Information     Date Of Birth          1935        Visit Information        Provider Department      6/26/2018 10:30 AM Agustin Peguero MD [facility]        Today's Diagnoses     Routine general medical examination at a health care facility    -  1    Malignant neoplasm of left female breast, unspecified estrogen receptor status, unspecified site of breast (H)        Essential hypertension with goal blood pressure less than 140/90        Hyperlipidemia LDL goal <130        Iron deficiency anemia, unspecified iron deficiency anemia type        Erosive gastritis        Osteopenia, unspecified location          Care Instructions    I would get the new shingles shot called shingrix at your pharmacy    Agustin Peguero M.D.              Preventive Health Recommendations    Female Ages 65 +    Yearly exam:     See your health care provider every year in order to  o Review health changes.   o Discuss preventive care.    o Review your medicines if your doctor has prescribed any.      You no longer need a yearly Pap test unless you've had an abnormal Pap test in the past 10 years. If you have vaginal symptoms, such as bleeding or discharge, be sure to talk with your provider about a Pap test.      Every 1 to 2 years, have a mammogram.  If you are over 69, talk with your health care provider about whether or not you want to continue having screening mammograms.      Every 10 years, have a colonoscopy. Or, have a yearly FIT test (stool test). These exams will check for colon cancer.       Have a cholesterol test every 5 years, or more often if your doctor advises it.       Have a diabetes test (fasting glucose) every three years. If you are at risk for diabetes, you should have this test more often.       At age 65, have a bone density scan (DEXA) to check for osteoporosis (brittle bone  disease).    Shots:    Get a flu shot each year.    Get a tetanus shot every 10 years.    Talk to your doctor about your pneumonia vaccines. There are now two you should receive - Pneumovax (PPSV 23) and Prevnar (PCV 13).    Talk to your pharmacist about the shingles vaccine.    Talk to your doctor about the hepatitis B vaccine.    Nutrition:     Eat at least 5 servings of fruits and vegetables each day.      Eat whole-grain bread, whole-wheat pasta and brown rice instead of white grains and rice.      Get adequate Calcium and Vitamin D.     Lifestyle    Exercise at least 150 minutes a week (30 minutes a day, 5 days a week). This will help you control your weight and prevent disease.      Limit alcohol to one drink per day.      No smoking.       Wear sunscreen to prevent skin cancer.       See your dentist twice a year for an exam and cleaning.      See your eye doctor every 1 to 2 years to screen for conditions such as glaucoma, macular degeneration and cataracts.          Follow-ups after your visit        Who to contact     If you have questions or need follow up information about today's clinic visit or your schedule please contact [facility] directly at [phone].  Normal or non-critical lab and imaging results will be communicated to you by MyChart, letter or phone within 4 business days after the clinic has received the results. If you do not hear from us within 7 days, please contact the clinic through MyChart or phone. If you have a critical or abnormal lab result, we will notify you by phone as soon as possible.  Submit refill requests through [employer] or call your pharmacy and they will forward the refill request to us. Please allow 3 business days for your refill to be completed.          Additional Information About Your Visit        Care EveryWhere ID     This is your Care EveryWhere ID. This could be used by other organizations to access your [place] medical records  [id number]       "  Your Vitals Were     Pulse Temperature Height Pulse Oximetry Breastfeeding? BMI (Body Mass Index)    68 97.8  F (36.6  C) (Oral) 5' 4.5\" (1.638 m) 96% No 22.48 kg/m2       Blood Pressure from Last 3 Encounters:   06/26/18 129/67   09/13/17 130/67   06/20/17 120/67    Weight from Last 3 Encounters:   06/26/18 133 lb (60.3 kg)   09/13/17 133 lb (60.3 kg)   06/20/17 150 lb (68 kg)              Today, you had the following     No orders found for display       Primary Care Provider Office Phone # Fax #    Agustin Peguero [phone] [address] [facility] [address]  [facility] [address]        Equal Access to Services     [facility] : Hadii whitney donaldo Sovadim, waaxda luqadaha, qaybta kaalmada yeny, florentin neff . So [facility] [phone].    ATENCIÓN: Si habla español, tiene a john disposición servicios gratuitos de asistencia lingüística. Jacoby al [phone].    We comply with applicable federal civil rights laws and [place] laws. We do not discriminate on the basis of race, color, national origin, age, disability, sex, sexual orientation, or gender identity.            Thank you!     Thank you for choosing [facility]  for your care. Our goal is always to provide you with excellent care. Hearing back from our patients is one way we can continue to improve our services. Please take a few minutes to complete the written survey that you may receive in the mail after your visit with us. Thank you!             Your Updated Medication List - Protect others around you: Learn how to safely use, store and throw away your medicines at www.disposemymeds.org.          This list is accurate as of 6/26/18 10:31 AM.  Always use your most recent med list.                   Brand Name Dispense Instructions for use Diagnosis    calcium 600 + D 600-400 MG-UNIT per tablet   Generic drug:  calcium-vitamin D      Take 3 tablets by mouth daily.        ferrous sulfate 325 (65 " Fe) MG tablet    IRON     Take 325 mg by mouth every other day        irbesartan-hydrochlorothiazide 150-12.5 MG per tablet    AVALIDE    90 tablet    Take 1 tablet by mouth daily    Essential hypertension with goal blood pressure less than 140/90       METAMUCIL 30.9 % Powd   Generic drug:  psyllium      Take 3 tsp by mouth daily.        simvastatin 20 MG tablet    ZOCOR    90 tablet    TAKE 1 TABLET BY MOUTH EVERY DAY IN THE EVENING    Hyperlipidemia LDL goal <130       VITAMIN C PO

## 2018-06-26 NOTE — PATIENT INSTRUCTIONS
I would get the new shingles shot called shingrix at your pharmacy    Agustin Peguero M.D.              Preventive Health Recommendations    Female Ages 65 +    Yearly exam:     See your health care provider every year in order to  o Review health changes.   o Discuss preventive care.    o Review your medicines if your doctor has prescribed any.      You no longer need a yearly Pap test unless you've had an abnormal Pap test in the past 10 years. If you have vaginal symptoms, such as bleeding or discharge, be sure to talk with your provider about a Pap test.      Every 1 to 2 years, have a mammogram.  If you are over 69, talk with your health care provider about whether or not you want to continue having screening mammograms.      Every 10 years, have a colonoscopy. Or, have a yearly FIT test (stool test). These exams will check for colon cancer.       Have a cholesterol test every 5 years, or more often if your doctor advises it.       Have a diabetes test (fasting glucose) every three years. If you are at risk for diabetes, you should have this test more often.       At age 65, have a bone density scan (DEXA) to check for osteoporosis (brittle bone disease).    Shots:    Get a flu shot each year.    Get a tetanus shot every 10 years.    Talk to your doctor about your pneumonia vaccines. There are now two you should receive - Pneumovax (PPSV 23) and Prevnar (PCV 13).    Talk to your pharmacist about the shingles vaccine.    Talk to your doctor about the hepatitis B vaccine.    Nutrition:     Eat at least 5 servings of fruits and vegetables each day.      Eat whole-grain bread, whole-wheat pasta and brown rice instead of white grains and rice.      Get adequate Calcium and Vitamin D.     Lifestyle    Exercise at least 150 minutes a week (30 minutes a day, 5 days a week). This will help you control your weight and prevent disease.      Limit alcohol to one drink per day.      No smoking.       Wear sunscreen to prevent  skin cancer.       See your dentist twice a year for an exam and cleaning.      See your eye doctor every 1 to 2 years to screen for conditions such as glaucoma, macular degeneration and cataracts.

## 2018-06-30 DIAGNOSIS — I10 ESSENTIAL HYPERTENSION WITH GOAL BLOOD PRESSURE LESS THAN 140/90: ICD-10-CM

## 2018-07-02 RX ORDER — IRBESARTAN AND HYDROCHLOROTHIAZIDE 150; 12.5 MG/1; MG/1
TABLET, FILM COATED ORAL
Start: 2018-07-02

## 2018-07-02 NOTE — TELEPHONE ENCOUNTER
"Irbesartan-hydrochlorothiazide 150-12.5 mg    Last Written Prescription Date:  06/26/18  Last Fill Quantity: 90 tablets,  # refills: 3   Last office visit: 6/26/2018 with prescribing provider:  Marielena   Future Office Visit:   Next 5 appointments (look out 90 days)     Jul 24, 2018  9:30 AM CDT   Office Visit with Agustin Peguero MD   [facility] ([facility]    [address] [facility] [id number] [phone]                 Requested Prescriptions   Pending Prescriptions Disp Refills     irbesartan-hydrochlorothiazide (AVALIDE) 150-12.5 MG per tablet [Pharmacy Med Name: IRBESARTAN/HCTZ 150-12.5MG TABLETS] 90 tablet 0     Sig: TAKE 1 TABLET BY MOUTH DAILY    Angiotensin-II Receptors Passed    6/30/2018  4:54 PM       Passed - Blood pressure under 140/90 in past 12 months    BP Readings from Last 3 Encounters:   06/26/18 129/67   09/13/17 130/67   06/20/17 120/67                Passed - Recent (12 mo) or future (30 days) visit within the authorizing provider's specialty    Patient had office visit in the last 12 months or has a visit in the next 30 days with authorizing provider or within the authorizing provider's specialty.  See \"Patient Info\" tab in inbasket, or \"Choose Columns\" in Meds & Orders section of the refill encounter.           Passed - Patient is age 18 or older       Passed - No active pregnancy on record       Passed - Normal serum creatinine on file in past 12 months    Recent Labs   Lab Test  06/19/18   0754   CR  0.76            Passed - Normal serum potassium on file in past 12 months    Recent Labs   Lab Test  06/19/18   0754   POTASSIUM  4.1                   Passed - No positive pregnancy test in past 12 months          "

## 2018-07-24 ENCOUNTER — OFFICE VISIT (OUTPATIENT)
Dept: FAMILY MEDICINE | Facility: CLINIC | Age: 83
End: 2018-07-24
Payer: MEDICARE

## 2018-07-24 VITALS
HEIGHT: 65 IN | TEMPERATURE: 97.8 F | OXYGEN SATURATION: 97 % | WEIGHT: 133 LBS | HEART RATE: 80 BPM | SYSTOLIC BLOOD PRESSURE: 112 MMHG | BODY MASS INDEX: 22.16 KG/M2 | DIASTOLIC BLOOD PRESSURE: 64 MMHG

## 2018-07-24 DIAGNOSIS — M85.80 OSTEOPENIA, UNSPECIFIED LOCATION: Primary | ICD-10-CM

## 2018-07-24 PROCEDURE — 99213 OFFICE O/P EST LOW 20 MIN: CPT | Performed by: INTERNAL MEDICINE

## 2018-07-24 NOTE — PROGRESS NOTES
The patient presents for follow-up to discuss her recent bone density scan showing osteopenia.  She has had a prior one is noted but this was done at [facility] so is hard to compare the 2.    The patient has minimal risk factors.  She does not smoke or drink, exercises regularly, has not been on steroids.  Her parents  at 70 so she not sure about a family history.  She does not have rheumatoid arthritis.  She is not taking any medications that should affect her bones.  As noted, she does have a history of breast cancer.  She did receive 5 years of Arimidex but is off that now.    I went over the bone density study with the patient and explained the frax score as well as discussed the meaning of osteopenia and her risk for fracture.  We discussed the things that she could do without taking medication including regular exercise, extra calcium if she is not getting it in her diet and vitamin D.  She really is already doing everything possible on her in.  We then discussed the use of medication including Fosamax as well as Prolia.  I did explain that with her frax score of greater than 3% it would be reasonable to add medication.  I discussed the benefit and potential side effects of the medications at this point she is quite hesitant to take them.  I certainly understand this and think it is reasonable.  At this point the plan will be to do another bone density test in 1 year and if it shows significant decrease then consider prescription medication.    Time spent over 20 minutes with the patient following discussion.    ASSESSMENT:  Osteopenia    PLAN:  Above    Agustin Peguero M.D.

## 2018-07-24 NOTE — MR AVS SNAPSHOT
"              After Visit Summary   7/24/2018    Aysha Rose    MRN: [id number]           Patient Information     Date Of Birth          1935        Visit Information        Provider Department      7/24/2018 9:30 AM Agustin Peguero MD [facility] [place]        Today's Diagnoses     Osteopenia, unspecified location    -  1       Follow-ups after your visit        Who to contact     If you have questions or need follow up information about today's clinic visit or your schedule please contact [facility] directly at [phone].  Normal or non-critical lab and imaging results will be communicated to you by MyChart, letter or phone within 4 business days after the clinic has received the results. If you do not hear from us within 7 days, please contact the clinic through MyChart or phone. If you have a critical or abnormal lab result, we will notify you by phone as soon as possible.  Submit refill requests through [employer] or call your pharmacy and they will forward the refill request to us. Please allow 3 business days for your refill to be completed.          Additional Information About Your Visit        Care EveryWhere ID     This is your Care EveryWhere ID. This could be used by other organizations to access your [facility] medical records  [id number]        Your Vitals Were     Pulse Temperature Height Pulse Oximetry Breastfeeding? BMI (Body Mass Index)    80 97.8  F (36.6  C) (Oral) 5' 4.5\" (1.638 m) 97% No 22.48 kg/m2       Blood Pressure from Last 3 Encounters:   07/24/18 112/64   06/26/18 129/67   09/13/17 130/67    Weight from Last 3 Encounters:   07/24/18 133 lb (60.3 kg)   06/26/18 133 lb (60.3 kg)   09/13/17 133 lb (60.3 kg)              Today, you had the following     No orders found for display       Primary Care Provider Office Phone # Fax #    Agustin Peguero [phone] [address]  [place] [address]        Equal Access to " Services     [facility]: Hadii aad ku hadmayelinciaran Sydneevadim, waaxda luqadaha, qaybta kaalmabekah saini, florentin neff . So [facility] [phone].    ATENCIÓN: Si habla aleta, tiene a john disposición servicios gratuitos de asistencia lingüística. Llame al [phone].    We comply with applicable federal civil rights laws and [place] laws. We do not discriminate on the basis of race, color, national origin, age, disability, sex, sexual orientation, or gender identity.            Thank you!     Thank you for choosing [facility]  for your care. Our goal is always to provide you with excellent care. Hearing back from our patients is one way we can continue to improve our services. Please take a few minutes to complete the written survey that you may receive in the mail after your visit with us. Thank you!             Your Updated Medication List - Protect others around you: Learn how to safely use, store and throw away your medicines at www.disposemymeds.org.          This list is accurate as of 7/24/18  9:47 AM.  Always use your most recent med list.                   Brand Name Dispense Instructions for use Diagnosis    calcium 600 + D 600-400 MG-UNIT per tablet   Generic drug:  calcium-vitamin D      Take 3 tablets by mouth daily.        ferrous sulfate 325 (65 Fe) MG tablet    IRON     Take 325 mg by mouth every other day        irbesartan-hydrochlorothiazide 150-12.5 MG per tablet    AVALIDE    90 tablet    Take 1 tablet by mouth daily    Essential hypertension with goal blood pressure less than 140/90       METAMUCIL 30.9 % Powd   Generic drug:  psyllium      Take 3 tsp by mouth daily.        simvastatin 20 MG tablet    ZOCOR    90 tablet    TAKE 1 TABLET BY MOUTH EVERY DAY IN THE EVENING    Hyperlipidemia LDL goal <130       VITAMIN C PO

## 2019-03-05 ENCOUNTER — APPOINTMENT (OUTPATIENT)
Dept: GENERAL RADIOLOGY | Facility: CLINIC | Age: 84
End: 2019-03-05
Attending: EMERGENCY MEDICINE
Payer: MEDICARE

## 2019-03-05 ENCOUNTER — HOSPITAL ENCOUNTER (EMERGENCY)
Facility: CLINIC | Age: 84
Discharge: HOME | End: 2019-03-05
Attending: EMERGENCY MEDICINE | Admitting: EMERGENCY MEDICINE
Payer: MEDICARE

## 2019-03-05 VITALS
TEMPERATURE: 98.3 F | DIASTOLIC BLOOD PRESSURE: 81 MMHG | OXYGEN SATURATION: 98 % | HEART RATE: 84 BPM | SYSTOLIC BLOOD PRESSURE: 150 MMHG | RESPIRATION RATE: 18 BRPM

## 2019-03-05 DIAGNOSIS — S01.21XA LACERATION OF NOSE, INITIAL ENCOUNTER: ICD-10-CM

## 2019-03-05 DIAGNOSIS — S42.202A CLOSED FRACTURE OF PROXIMAL END OF LEFT HUMERUS, UNSPECIFIED FRACTURE MORPHOLOGY, INITIAL ENCOUNTER: ICD-10-CM

## 2019-03-05 PROCEDURE — 73030 X-RAY EXAM OF SHOULDER: CPT | Mod: LT

## 2019-03-05 PROCEDURE — 25000132 ZZH RX MED GY IP 250 OP 250 PS 637: Mod: GY | Performed by: EMERGENCY MEDICINE

## 2019-03-05 PROCEDURE — A9270 NON-COVERED ITEM OR SERVICE: HCPCS | Mod: GY | Performed by: EMERGENCY MEDICINE

## 2019-03-05 PROCEDURE — 23600 CLTX PROX HUMRL FX W/O MNPJ: CPT | Mod: LT

## 2019-03-05 PROCEDURE — 99283 EMERGENCY DEPT VISIT LOW MDM: CPT | Mod: 25

## 2019-03-05 RX ORDER — ACETAMINOPHEN 500 MG
1000 TABLET ORAL ONCE
Status: COMPLETED | OUTPATIENT
Start: 2019-03-05 | End: 2019-03-05

## 2019-03-05 RX ADMIN — ACETAMINOPHEN 1000 MG: 500 TABLET, FILM COATED ORAL at 18:29

## 2019-03-05 ASSESSMENT — ENCOUNTER SYMPTOMS
ARTHRALGIAS: 1
NUMBNESS: 0
WEAKNESS: 0
WOUND: 1
MYALGIAS: 1
NECK PAIN: 0
DIZZINESS: 1

## 2019-03-05 NOTE — ED AVS SNAPSHOT
Emergency Department  [address] [place] [id number]  Phone:  [phone]  Fax:  [phone]                                    Aysha Rose   MRN: [id number]    Department:   Emergency Department   Date of Visit:  3/5/2019           After Visit Summary Signature Page    I have received my discharge instructions, and my questions have been answered. I have discussed any challenges I see with this plan with the nurse or doctor.    ..........................................................................................................................................  Patient/Patient Representative Signature      ..........................................................................................................................................  Patient Representative Print Name and Relationship to Patient    ..................................................               ................................................  Date                                   Time    ..........................................................................................................................................  Reviewed by Signature/Title    ...................................................              ..............................................  Date                                               Time          22EPIC Rev 08/18

## 2019-03-06 NOTE — DISCHARGE INSTRUCTIONS
Return to the emergency department or seek medical care as instructed if your symptoms fail to improve or significantly worsen.    Take Acetaminophen (aka Tylenol) and/or ibuprofen (aka Motrin/Advil, 400mg up to 4 times per day with food) as needed for symptom/pain relief; use as directed.    Ice area of pain for 20 minutes four times per day for the next two days      Follow-up as indicated on page 1.  Maintain adequate hydration and get plenty of rest.    Discharge Instructions  Laceration (Cut)    You were seen today for a laceration (cut).  Your provider examined your laceration for any problems such a buried foreign body (like glass, a splinter, or gravel), or injury to blood vessels, tendons, and nerves.  Your provider may have also rinsed and/or scrubbed your laceration to help prevent an infection. It may not be possible to find all problems with your laceration on the first visit; occasionally foreign bodies or a tendon injury can go undetected.    Your laceration may have been closed in one of several ways:  No closure: many wounds will heal just fine without closure.  Stitches: regular stitches that require removal.  Staples: skin staples are often used in the scalp/head.  Wound adhesive (glue): skin glue can be used for certain lacerations and doesn?t require removal.  Wound strips (aka Butterfly bandages or steri-strips): these are bandages that help to close a wound.  Absorbable stitches: ?dissolving? stitches that go away on their own and usually don?t require removal.    A small percentage of wounds will develop an infection regardless of how well the wound is cared for. Antibiotics are generally not indicated to prevent an infection so are only given for a small number of high-risk wounds. Some lacerations are too high risk to close, and are left open to heal because closure can increase the likelihood that an infection will develop.    Remember that all lacerations, no matter how expertly repaired,  will cause scarring. We consider many factors, techniques, and materials, in our efforts to provide the best possible cosmetic outcome.    Generally, every Emergency Department visit should have a follow-up clinic visit with either a primary or a specialty clinic/provider. Please follow-up as instructed by your emergency provider today.     Return to the Emergency Department right away if:  You have more redness, swelling, pain, drainage (pus), a bad smell, or red streaking from your laceration as these symptoms could indicate an infection.  You have a fever of 100.4 F or more.  You have bleeding that you cannot stop at home. If your cut starts to bleed, hold pressure on the bleeding area with a clean cloth or put pressure over the bandage.  If the bleeding does not stop after using constant pressure for 30 minutes, you should return to the Emergency Department for further treatment.  An area past the laceration is cool, pale, or blue compared with the other side, or has a slower return of color when squeezed.  Your dressing seems too tight or starts to get uncomfortable or painful. For children, signs of a problem might be irritability or restlessness.  You have loss of normal function or use of an area, such as being unable to straighten or bend a finger normally.  You have a numb area past the laceration.    Return to the Emergency Department or see your regular provider if:  The laceration starts to come open.   You have something coming out of the cut or a feeling that there is something in the laceration.  Your wound will not heal, or keeps breaking open. There can always be glass, wood, dirt or other things in any wound.  They will not always show up, even on x-rays.  If a wound does not heal, this may be why, and it is important to follow-up with your regular provider.    Home Care:  Take your dressing off in 12-24 hours, or as instructed by your provider, to check your laceration. Remove the dressing sooner  if it seems too tight or painful, or if it is getting numb, tingly, or pale past the dressing.  Gently wash your laceration 1-2 times daily with clean water and mild soap. It is okay to shower or run clean water over the laceration, but do not let the laceration soak in water (no swimming).  If your laceration was closed with wound adhesive or strips: pat it dry and leave it open to the air. For all other repairs: after you wash your laceration, or at least 2 times a day, apply antibiotic ointment (such as Neosporin  or Bacitracin ) to the laceration, then cover it with a Band-Aid  or gauze.  Keep the laceration clean. Wear gloves or other protective clothing if you are around dirt.    Follow-up for removal:  If your wound was closed with staples or regular stitches, they need to be removed according to the instructions and timeline specified by your provider today.  If your wound was closed with absorbable (?dissolving?) sutures, they should fall out, dissolve, or not be visible in about one week. If they are still visible, then they should be removed according to the instructions and timeline specified by your provider today.    Scars:  To help minimize scarring:  Wear sunscreen over the healed laceration when out in the sun.  Massage the area regularly once healed.  You may apply Vitamin E to the healed wound.  Wait. Scars improve in appearance over months and years.    If you were given a prescription for medicine here today, be sure to read all of the information (including the package insert) that comes with your prescription.  This will include important information about the medicine, its side effects, and any warnings that you need to know about.  The pharmacist who fills the prescription can provide more information and answer questions you may have about the medicine.  If you have questions or concerns that the pharmacist cannot address, please call or return to the Emergency Department.       Remember  that you can always come back to the Emergency Department if you are not able to see your regular provider in the amount of time listed above, if you get any new symptoms, or if there is anything that worries you.

## 2019-03-06 NOTE — ED PROVIDER NOTES
History     Chief Complaint:  Fall     HPI   Aysha Rose is a 83 year old female who presents to the emergency department for evaluation of a fall. The patient reports [place] when she sustained a witnessed slip and fall on ice, striking her nose and left arm on a car, but preventing herself from falling to the ground. She is unsure of LOC, but she remarks witnesses told her she was less unresponsive for around 30 seconds. The patient complains of left shoulder pain since the fall but denies any neck pain, numbness, or weakness. She further reports some dizziness immediately after the incident, and she did require assistance to ambulate following her fall. She also sustained a laceration to the right side of the bridge of her nose. The patient is right-handed.    Allergies:  Penicillins     Medications:    Avalide  Metamucil  Zocor   Losartan     Past Medical History:    Breast cancer  Erosive gastritis  HLD  HTN  Iron deficiency anemia  Osteopenia  Syncope    Past Surgical History:    Cataract IOL  Lumpectomy breast    Family History:    CAD    Social History:  Presents alone.  Never smoker.  Negative for alcohol use.  Marital Status:   [2]     Review of Systems   Musculoskeletal: Positive for arthralgias and myalgias. Negative for neck pain.   Skin: Positive for wound.   Neurological: Positive for dizziness. Negative for weakness and numbness.   All other systems reviewed and are negative.      Physical Exam     Patient Vitals for the past 24 hrs:   BP Temp Temp src Pulse Resp SpO2   03/05/19 1830 -- -- -- -- -- 98 %   03/05/19 1815 150/81 -- -- 84 18 99 %   03/05/19 1805 160/80 -- -- 81 -- 96 %   03/05/19 1800 160/80 -- -- -- 18 99 %   03/05/19 1754 146/69 98.3  F (36.8  C) Oral 80 18 97 %     Physical Exam  General: Alert and cooperative with exam. Patient in mild distress. Normal mentation.  Head:  Scalp is NC/AT. Small 0.5 cm partial thickness laceration over the  right bridge of her nose without significant nasal tenderness or evidence nasal deformity or septal deviation.  Eyes:  No scleral icterus, PERRL, EOMI  ENT:  The external nose and ears are normal. The oropharynx is normal and without erythema; mucus membranes are moist. Uvula midline, no evidence of deep space infection.  Neck:  Normal range of motion without rigidity.  CV:  Regular rate and rhythm    No pathologic murmur   Resp:  Breath sounds are clear bilaterally    Non-labored, no retractions or accessory muscle use  GI:  Abdomen is soft, no distension, no tenderness. No peritoneal signs  MS:  No lower extremity edema. Tenderness to palpation over the left anterior shoulder without any overlying skin change.  Skin:  Warm and dry, No rash or lesions noted.  Neuro: Oriented x 3. No gross motor deficits. LUE: CMS intact. CN 2-12 intact      Emergency Department Course     Imaging:  Radiographic findings were communicated with the patient who voiced understanding of the findings.    XR Shoulder left G/E 3 Views:  There is a comminuted, impacted fracture of the proximal  diaphysis of the right humerus. Right glenohumeral alignment appears  normal. No other fractures noted. As per radiology.    Interventions:  1829 Tylenol 1000 mg PO    Emergency Department Course:  Nursing notes and vitals reviewed. 1817 I performed an exam of the patient as documented above.     Medicine administered as documented above.     The patient was sent for a XR Shoulder while in the emergency department, findings above.     1930 I rechecked the patient and discussed the results of her workup thus far. I used Steri-Strips to repair her nose laceration.    Findings and plan explained to the Patient. Patient discharged home with instructions regarding supportive care, medications, and reasons to return. The importance of close follow-up was reviewed.     Impression & Plan      Medical Decision Making:  Aysha Rose is a 83-year-old  female who presents status post mechanical fall with associated nasal laceration and left shoulder pain.  Patient's medical history and records were reviewed.  Patient's neurologic exam was normal and, with exception of small nasal laceration, no other evidence of significant head injury.  C-spine cleared clinically.  I do not feel that the patient needs head CT at this time as she is not anticoagulated.  Laceration was superficial and repaired with Steri-Strips.  Patient's tetanus up-to-date.  Imaging of the patient's left shoulder demonstrates proximal humerus fracture as noted above.  A sling was provided.  Patient also given Tylenol for pain control.  I recommended continued use of Tylenol/ibuprofen as needed for pain as well as icing.  She is to follow-up closely with [facility] orthopedics.  Return precautions were discussed.  Patient was discharged home.  At time of discharge, patient was hemodynamically stable, neurologically intact, and pain was well controlled.  No indication for further labs or imaging at this time.    Diagnosis:    ICD-10-CM    1. Laceration of nose, initial encounter S01.21XA    2. Closed fracture of proximal end of left humerus, unspecified fracture morphology, initial encounter S42.202A        Disposition:  discharged to home    Sanford CHRISTENSEN, am serving as a scribe on 3/5/2019 at 6:04 PM to personally document services performed by Aamir Dugan DO based on my observations and the provider's statements to me.     Sanford Verdugo  3/5/2019    EMERGENCY DEPARTMENT       Aamir Dugan DO  03/06/19 0908       Aamir Dugan DO  03/06/19 0909

## 2019-03-07 ENCOUNTER — TRANSFERRED RECORDS (OUTPATIENT)
Dept: HEALTH INFORMATION MANAGEMENT | Facility: CLINIC | Age: 84
End: 2019-03-07

## 2019-03-11 ENCOUNTER — OFFICE VISIT (OUTPATIENT)
Dept: FAMILY MEDICINE | Facility: CLINIC | Age: 84
End: 2019-03-11
Payer: MEDICARE

## 2019-03-11 VITALS
BODY MASS INDEX: 23.94 KG/M2 | WEIGHT: 143.7 LBS | SYSTOLIC BLOOD PRESSURE: 140 MMHG | DIASTOLIC BLOOD PRESSURE: 80 MMHG | OXYGEN SATURATION: 97 % | TEMPERATURE: 96.7 F | HEART RATE: 98 BPM | HEIGHT: 65 IN

## 2019-03-11 DIAGNOSIS — Z01.818 PREOP GENERAL PHYSICAL EXAM: Primary | ICD-10-CM

## 2019-03-11 DIAGNOSIS — S42.202K CLOSED FRACTURE OF PROXIMAL END OF LEFT HUMERUS WITH NONUNION, UNSPECIFIED FRACTURE MORPHOLOGY, SUBSEQUENT ENCOUNTER: ICD-10-CM

## 2019-03-11 LAB
ANION GAP SERPL CALCULATED.3IONS-SCNC: 5 MMOL/L (ref 3–14)
BASOPHILS # BLD AUTO: 0.1 10E9/L (ref 0–0.2)
BASOPHILS NFR BLD AUTO: 0.6 %
BUN SERPL-MCNC: 13 MG/DL (ref 7–30)
CALCIUM SERPL-MCNC: 9.6 MG/DL (ref 8.5–10.1)
CHLORIDE SERPL-SCNC: 103 MMOL/L (ref 94–109)
CO2 SERPL-SCNC: 31 MMOL/L (ref 20–32)
CREAT SERPL-MCNC: 0.66 MG/DL (ref 0.52–1.04)
DIFFERENTIAL METHOD BLD: NORMAL
EOSINOPHIL # BLD AUTO: 0.4 10E9/L (ref 0–0.7)
EOSINOPHIL NFR BLD AUTO: 4.5 %
ERYTHROCYTE [DISTWIDTH] IN BLOOD BY AUTOMATED COUNT: 13.3 % (ref 10–15)
GFR SERPL CREATININE-BSD FRML MDRD: 81 ML/MIN/{1.73_M2}
GLUCOSE SERPL-MCNC: 83 MG/DL (ref 70–99)
HCT VFR BLD AUTO: 38.2 % (ref 35–47)
HGB BLD-MCNC: 12.4 G/DL (ref 11.7–15.7)
LYMPHOCYTES # BLD AUTO: 1.9 10E9/L (ref 0.8–5.3)
LYMPHOCYTES NFR BLD AUTO: 22.8 %
MCH RBC QN AUTO: 31.5 PG (ref 26.5–33)
MCHC RBC AUTO-ENTMCNC: 32.5 G/DL (ref 31.5–36.5)
MCV RBC AUTO: 97 FL (ref 78–100)
MONOCYTES # BLD AUTO: 0.7 10E9/L (ref 0–1.3)
MONOCYTES NFR BLD AUTO: 8.8 %
NEUTROPHILS # BLD AUTO: 5.3 10E9/L (ref 1.6–8.3)
NEUTROPHILS NFR BLD AUTO: 63.3 %
PLATELET # BLD AUTO: 276 10E9/L (ref 150–450)
POTASSIUM SERPL-SCNC: 3.9 MMOL/L (ref 3.4–5.3)
RBC # BLD AUTO: 3.94 10E12/L (ref 3.8–5.2)
SODIUM SERPL-SCNC: 139 MMOL/L (ref 133–144)
WBC # BLD AUTO: 8.4 10E9/L (ref 4–11)

## 2019-03-11 PROCEDURE — 93000 ELECTROCARDIOGRAM COMPLETE: CPT | Performed by: NURSE PRACTITIONER

## 2019-03-11 PROCEDURE — 85025 COMPLETE CBC W/AUTO DIFF WBC: CPT | Performed by: NURSE PRACTITIONER

## 2019-03-11 PROCEDURE — 80048 BASIC METABOLIC PNL TOTAL CA: CPT | Performed by: NURSE PRACTITIONER

## 2019-03-11 PROCEDURE — 36415 COLL VENOUS BLD VENIPUNCTURE: CPT | Performed by: NURSE PRACTITIONER

## 2019-03-11 PROCEDURE — 99214 OFFICE O/P EST MOD 30 MIN: CPT | Performed by: NURSE PRACTITIONER

## 2019-03-11 RX ORDER — HYDROCODONE BITARTRATE AND ACETAMINOPHEN 5; 325 MG/1; MG/1
1 TABLET ORAL EVERY 6 HOURS PRN
Qty: 20 TABLET | Refills: 0 | Status: ON HOLD | OUTPATIENT
Start: 2019-03-11 | End: 2019-03-16

## 2019-03-11 ASSESSMENT — MIFFLIN-ST. JEOR: SCORE: 1099.76

## 2019-03-11 NOTE — H&P (VIEW-ONLY)
[address] [address]  [phone]  Dept: [phone]    PRE-OP EVALUATION:  Today's date: 3/11/2019    Aysha Rose (: 1935) presents for pre-operative evaluation assessment as requested by Dr. Younger.  She requires evaluation and anesthesia risk assessment prior to undergoing surgery/procedure for treatment of Shoulder Reconstruction .    Proposed Surgery/ Procedure: Shoulder Reconstruction  Date of Surgery/ Procedure: To be determined  Time of Surgery/ Procedure: to be determined  Hospital/Surgical Facility: To be determined  Fax number for surgical facility: To be determined  Primary Physician: Agustin Peguero  Type of Anesthesia Anticipated: to be determined    Patient has a Health Care Directive or Living Will:  YES     1. NO - Do you have a history of heart attack, stroke, stent, bypass or surgery on an artery in the head, neck, heart or legs?  2. NO - Do you ever have any pain or discomfort in your chest?  3. NO - Do you have a history of  Heart Failure?  4. NO - Are you troubled by shortness of breath when: walking on the level, up a slight hill or at night?  5. NO - Do you currently have a cold, bronchitis or other respiratory infection?  6. NO - Do you have a cough, shortness of breath or wheezing?  7. NO - Do you sometimes get pains in the calves of your legs when you walk?  8. NO - Do you or anyone in your family have previous history of blood clots?  9. NO - Do you or does anyone in your family have a serious bleeding problem such as prolonged bleeding following surgeries or cuts?  10. Yes - Have you ever had problems with anemia or been told to take iron pills?  11. NO - Have you had any abnormal blood loss such as black, tarry or bloody stools, or abnormal vaginal bleeding?  12. NO - Have you ever had a blood transfusion?  13. NO - Have you or any of your relatives ever had problems with anesthesia?  14. NO - Do you have sleep apnea,  "excessive snoring or daytime drowsiness?  15. NO - Do you have any prosthetic heart valves?  16. NO - Do you have prosthetic joints?  17. NO - Is there any chance that you may be pregnant?      HPI:     HPI related to upcoming procedure:  Fell on 3/5/19 and sustained fracture with xray report below  HISTORY: Fall, shoulder pain.                                                                      IMPRESSION: There is a comminuted, impacted fracture of the proximal  diaphysis of the right humerus. Right glenohumeral alignment appears  normal. No other fractures noted.     Subsequently seen at [facility] on 3/7/19   Diagnosed with \" displacement of the AC and smashed the head of the humerus\"  Has not yet seen her surgeon      ANEMIA - Patient has a recent history of moderate-severe anemia, which has not been symptomatic. Work up to date has revealed iron deficiency. Treatment has been intermittent every other day iron supplementation                                                                                                                                            .  HYPERTENSION - Patient has longstanding history of HTN , currently denies any symptoms referable to elevated blood pressure. Specifically denies chest pain, palpitations, dyspnea, orthopnea, PND or peripheral edema. Blood pressure readings have been in normal range. Current medication regimen is as listed below. Patient denies any side effects of medication.                                                                                                                                                                                          .    MEDICAL HISTORY:     Patient Active Problem List    Diagnosis Date Noted     Essential hypertension with goal blood pressure less than 140/90 06/08/2016     Priority: Medium     Malignant neoplasm of left female breast (H) 06/08/2016     Priority: Medium     Advance Care Planning 06/20/2014     Priority: Medium     " Advance Care Planning:   Receipt of ACP document:  Received: Health Care Directive which was witnessed or notarized on 04-.  Document not previously scanned.  Validation form completed and sent with document to be scanned. Code Status needs to be updated to reflect choices in most recent ACP document. Orders placed.  Confirmed/documented designated decision maker(s). See permanent comments section of demographics in clinical tab. View document(s) and details by clicking on code status.   Added by Yesenia Walsh on 6/20/2014.           Microscopic hematuria      Priority: Medium     Iron deficiency anemia      Priority: Medium     colonoscopy cecal angioectasia, egd with hh, small ulcer  Diagnosis updated by automated process. Provider to review and confirm.       Hyperlipidemia LDL goal <130 12/08/2011     Priority: Medium     Osteopenia      Priority: Medium     Paula       Erosive gastritis 10/01/2011     Priority: Medium     by egd, colon as above        Past Medical History:   Diagnosis Date     Abdominal pain 9/13    ct abd and pelvis neg     Breast cancer (H) 2007    lumpectomy and xrt Paula, got 5 years of arimidex     Erosive gastritis Oct 2011    by egd, colon as above, also small ulcer     High cholesterol      HTN (hypertension)     nl mr renogram     Hx of colonoscopy 2007, 2011    nl, 2011 with cecal angioect and 2mm polyp     FRANKLIN (iron deficiency anaemia) 2011, 2016 2011 colonoscopy cecal angioectasia, egd with hh, small ulcer     Microscopic hematuria 2012    Dr. Alonzo, ct done 11/28/12 negative, cysto neg     Osteopenia 2008    Paula, fu 2014 Paula and worse -1.5 left hip; fu here 7/18 a bit worse     Syncope 2011    neg est echo     Past Surgical History:   Procedure Laterality Date     CATARACT IOL, RT/LT       LUMPECTOMY BREAST  2007     Current Outpatient Medications   Medication Sig Dispense Refill     HYDROcodone-acetaminophen (NORCO) 5-325 MG tablet Take 1 tablet by mouth every 6  "hours as needed for pain 20 tablet 0     Ascorbic Acid (VITAMIN C PO)        calcium-vitamin D (CALCIUM 600 + D) 600-400 MG-UNIT per tablet Take 3 tablets by mouth daily.       ferrous sulfate (IRON) 325 (65 Fe) MG tablet Take 325 mg by mouth every other day        irbesartan-hydrochlorothiazide (AVALIDE) 150-12.5 MG per tablet Take 1 tablet by mouth daily 90 tablet 3     psyllium (METAMUCIL) 30.9 % POWD Take 3 tsp by mouth daily.       simvastatin (ZOCOR) 20 MG tablet TAKE 1 TABLET BY MOUTH EVERY DAY IN THE EVENING 90 tablet 3     OTC products: advil and tylenol    Allergies   Allergen Reactions     Penicillins       Latex Allergy: NO    Social History     Tobacco Use     Smoking status: Never Smoker     Smokeless tobacco: Never Used   Substance Use Topics     Alcohol use: No     History   Drug Use No       REVIEW OF SYSTEMS:   CONSTITUTIONAL: NEGATIVE for fever, chills, change in weight  INTEGUMENTARY/SKIN: NEGATIVE for worrisome rashes, moles or lesions  EYES: NEGATIVE for vision changes or irritation  ENT/MOUTH: NEGATIVE for ear, mouth and throat problems  RESP: NEGATIVE for significant cough or SOB  BREAST: NEGATIVE for masses, tenderness or discharge  CV: NEGATIVE for chest pain, palpitations or peripheral edema  GI: NEGATIVE for nausea, abdominal pain, heartburn, or change in bowel habits  : NEGATIVE for frequency, dysuria, or hematuria  MUSCULOSKELETAL: NEGATIVE for significant arthralgias or myalgia  NEURO: NEGATIVE for weakness, dizziness or paresthesias  ENDOCRINE: NEGATIVE for temperature intolerance, skin/hair changes  HEME: NEGATIVE for bleeding problems  PSYCHIATRIC: NEGATIVE for changes in mood or affect    EXAM:   /80 (BP Location: Right arm, Patient Position: Sitting, Cuff Size: Adult Regular)   Pulse 98   Temp 96.7  F (35.9  C) (Oral)   Ht 1.638 m (5' 4.5\")   Wt 65.2 kg (143 lb 11.2 oz)   SpO2 97%   BMI 24.29 kg/m        GENERAL APPEARANCE: healthy, alert and no distress     EYES: " EOMI, PERRL     HENT: ear canals and TM's normal and nose and mouth without ulcers or lesions     NECK: no adenopathy, no asymmetry, masses, or scars and thyroid normal to palpation     RESP: lungs clear to auscultation - no rales, rhonchi or wheezes     CV: regular rates and rhythm, normal S1 S2, no S3 or S4 and no murmur, click or rub     ABDOMEN:  soft, nontender, no HSM or masses and bowel sounds normal     MS: extremities left arm in sling, some mild edema of hand, good CMS     SKIN: ecchymosis of dorsum of left hand     NEURO:, sensory exam grossly normal, mentation intact and speech normal     PSYCH: mentation appears normal. and affect normal/bright     LYMPHATICS: No cervical adenopathy    DIAGNOSTICS:   EKG:Sinus  Rhythm   WITHIN NORMAL LIMITS      Recent Labs   Lab Test 06/19/18  0754 01/31/18  0810  06/16/17  0743   HGB 13.4 13.2   < > 12.2     --   --  239     --   --  141   POTASSIUM 4.1  --   --  4.4   CR 0.76  --   --  0.77    < > = values in this interval not displayed.      Results for orders placed or performed in visit on 03/11/19   CBC with platelets and differential   Result Value Ref Range    WBC 8.4 4.0 - 11.0 10e9/L    RBC Count 3.94 3.8 - 5.2 10e12/L    Hemoglobin 12.4 11.7 - 15.7 g/dL    Hematocrit 38.2 35.0 - 47.0 %    MCV 97 78 - 100 fl    MCH 31.5 26.5 - 33.0 pg    MCHC 32.5 31.5 - 36.5 g/dL    RDW 13.3 10.0 - 15.0 %    Platelet Count 276 150 - 450 10e9/L    % Neutrophils 63.3 %    % Lymphocytes 22.8 %    % Monocytes 8.8 %    % Eosinophils 4.5 %    % Basophils 0.6 %    Absolute Neutrophil 5.3 1.6 - 8.3 10e9/L    Absolute Lymphocytes 1.9 0.8 - 5.3 10e9/L    Absolute Monocytes 0.7 0.0 - 1.3 10e9/L    Absolute Eosinophils 0.4 0.0 - 0.7 10e9/L    Absolute Basophils 0.1 0.0 - 0.2 10e9/L    Diff Method Automated Method    Basic metabolic panel   Result Value Ref Range    Sodium 139 133 - 144 mmol/L    Potassium 3.9 3.4 - 5.3 mmol/L    Chloride 103 94 - 109 mmol/L    Carbon  Dioxide 31 20 - 32 mmol/L    Anion Gap 5 3 - 14 mmol/L    Glucose 83 70 - 99 mg/dL    Urea Nitrogen 13 7 - 30 mg/dL    Creatinine 0.66 0.52 - 1.04 mg/dL    GFR Estimate 81 >60 mL/min/[1.73_m2]    GFR Estimate If Black >90 >60 mL/min/[1.73_m2]    Calcium 9.6 8.5 - 10.1 mg/dL     IMPRESSION:   Reason for surgery/procedure: left humeral fracture    Diagnosis/reason for consult: pre op consult    The proposed surgical procedure is considered INTERMEDIATE risk.    REVISED CARDIAC RISK INDEX  The patient has the following serious cardiovascular risks for perioperative complications such as (MI, PE, VFib and 3  AV Block):  No serious cardiac risks  INTERPRETATION: 0 risks: Class I (very low risk - 0.4% complication rate)    The patient has the following additional risks for perioperative complications:  No identified additional risks      ICD-10-CM    1. Preop general physical exam Z01.818 CBC with platelets and differential     Basic metabolic panel     EKG 12-lead complete w/read - Clinics   2. 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    APPROVAL GIVEN to proceed with proposed procedure, without further diagnostic evaluation       Signed Electronically by: MUNA Rojas CNP    Copy of this evaluation report is provided to requesting physician.    [facility] Preop Guidelines    Revised Cardiac Risk Index

## 2019-03-11 NOTE — PROGRESS NOTES
[address] [address]  [phone]  Dept: [phone]    PRE-OP EVALUATION:  Today's date: 3/11/2019    Aysha Rose (: 1935) presents for pre-operative evaluation assessment as requested by Dr. Younger.  She requires evaluation and anesthesia risk assessment prior to undergoing surgery/procedure for treatment of Shoulder Reconstruction .    Proposed Surgery/ Procedure: Shoulder Reconstruction  Date of Surgery/ Procedure: To be determined  Time of Surgery/ Procedure: to be determined  Hospital/Surgical Facility: To be determined  Fax number for surgical facility: To be determined  Primary Physician: Agustin Peguero  Type of Anesthesia Anticipated: to be determined    Patient has a Health Care Directive or Living Will:  YES     1. NO - Do you have a history of heart attack, stroke, stent, bypass or surgery on an artery in the head, neck, heart or legs?  2. NO - Do you ever have any pain or discomfort in your chest?  3. NO - Do you have a history of  Heart Failure?  4. NO - Are you troubled by shortness of breath when: walking on the level, up a slight hill or at night?  5. NO - Do you currently have a cold, bronchitis or other respiratory infection?  6. NO - Do you have a cough, shortness of breath or wheezing?  7. NO - Do you sometimes get pains in the calves of your legs when you walk?  8. NO - Do you or anyone in your family have previous history of blood clots?  9. NO - Do you or does anyone in your family have a serious bleeding problem such as prolonged bleeding following surgeries or cuts?  10. Yes - Have you ever had problems with anemia or been told to take iron pills?  11. NO - Have you had any abnormal blood loss such as black, tarry or bloody stools, or abnormal vaginal bleeding?  12. NO - Have you ever had a blood transfusion?  13. NO - Have you or any of your relatives ever had problems with anesthesia?  14. NO - Do you have sleep apnea,  "excessive snoring or daytime drowsiness?  15. NO - Do you have any prosthetic heart valves?  16. NO - Do you have prosthetic joints?  17. NO - Is there any chance that you may be pregnant?      HPI:     HPI related to upcoming procedure:  Fell on 3/5/19 and sustained fracture with xray report below  HISTORY: Fall, shoulder pain.                                                                      IMPRESSION: There is a comminuted, impacted fracture of the proximal  diaphysis of the right humerus. Right glenohumeral alignment appears  normal. No other fractures noted.     Subsequently seen at [facility] on 3/7/19   Diagnosed with \" displacement of the AC and smashed the head of the humerus\"  Has not yet seen her surgeon      ANEMIA - Patient has a recent history of moderate-severe anemia, which has not been symptomatic. Work up to date has revealed iron deficiency. Treatment has been intermittent every other day iron supplementation                                                                                                                                            .  HYPERTENSION - Patient has longstanding history of HTN , currently denies any symptoms referable to elevated blood pressure. Specifically denies chest pain, palpitations, dyspnea, orthopnea, PND or peripheral edema. Blood pressure readings have been in normal range. Current medication regimen is as listed below. Patient denies any side effects of medication.                                                                                                                                                                                          .    MEDICAL HISTORY:     Patient Active Problem List    Diagnosis Date Noted     Essential hypertension with goal blood pressure less than 140/90 06/08/2016     Priority: Medium     Malignant neoplasm of left female breast (H) 06/08/2016     Priority: Medium     Advance Care Planning 06/20/2014     Priority: Medium     " Advance Care Planning:   Receipt of ACP document:  Received: Health Care Directive which was witnessed or notarized on 04-.  Document not previously scanned.  Validation form completed and sent with document to be scanned. Code Status needs to be updated to reflect choices in most recent ACP document. Orders placed.  Confirmed/documented designated decision maker(s). See permanent comments section of demographics in clinical tab. View document(s) and details by clicking on code status.   Added by Yesenia Walsh on 6/20/2014.           Microscopic hematuria      Priority: Medium     Iron deficiency anemia      Priority: Medium     colonoscopy cecal angioectasia, egd with hh, small ulcer  Diagnosis updated by automated process. Provider to review and confirm.       Hyperlipidemia LDL goal <130 12/08/2011     Priority: Medium     Osteopenia      Priority: Medium     [place]       Erosive gastritis 10/01/2011     Priority: Medium     by egd, colon as above        Past Medical History:   Diagnosis Date     Abdominal pain 9/13    ct abd and pelvis neg     Breast cancer (H) 2007    lumpectomy and xrt Paula, got 5 years of arimidex     Erosive gastritis Oct 2011    by egd, colon as above, also small ulcer     High cholesterol      HTN (hypertension)     nl mr renogram     Hx of colonoscopy 2007, 2011    nl, 2011 with cecal angioect and 2mm polyp     FR[facility]LIN (iron deficiency anaemia) 2011, 2016 2011 colonoscopy cecal angioectasia, egd with hh, small ulcer     Microscopic hematuria 2012    Dr. Alonzo, ct done 11/28/12 negative, cysto neg     Osteopenia 2008    Paula, fu 2014 Paula and worse -1.5 left hip; fu here 7/18 a bit worse     Syncope 2011    neg est echo     Past Surgical History:   Procedure Laterality Date     CATARACT IOL, RT/LT       LUMPECTOMY BREAST  2007     Current Outpatient Medications   Medication Sig Dispense Refill     HYDROcodone-acetaminophen (NORCO) 5-325 MG tablet Take 1 tablet by mouth every 6  "hours as needed for pain 20 tablet 0     Ascorbic Acid (VITAMIN C PO)        calcium-vitamin D (CALCIUM 600 + D) 600-400 MG-UNIT per tablet Take 3 tablets by mouth daily.       ferrous sulfate (IRON) 325 (65 Fe) MG tablet Take 325 mg by mouth every other day        irbesartan-hydrochlorothiazide (AVALIDE) 150-12.5 MG per tablet Take 1 tablet by mouth daily 90 tablet 3     psyllium (METAMUCIL) 30.9 % POWD Take 3 tsp by mouth daily.       simvastatin (ZOCOR) 20 MG tablet TAKE 1 TABLET BY MOUTH EVERY DAY IN THE EVENING 90 tablet 3     OTC products: advil and tylenol    Allergies   Allergen Reactions     Penicillins       Latex Allergy: NO    Social History     Tobacco Use     Smoking status: Never Smoker     Smokeless tobacco: Never Used   Substance Use Topics     Alcohol use: No     History   Drug Use No       REVIEW OF SYSTEMS:   CONSTITUTIONAL: NEGATIVE for fever, chills, change in weight  INTEGUMENTARY/SKIN: NEGATIVE for worrisome rashes, moles or lesions  EYES: NEGATIVE for vision changes or irritation  ENT/MOUTH: NEGATIVE for ear, mouth and throat problems  RESP: NEGATIVE for significant cough or SOB  BREAST: NEGATIVE for masses, tenderness or discharge  CV: NEGATIVE for chest pain, palpitations or peripheral edema  GI: NEGATIVE for nausea, abdominal pain, heartburn, or change in bowel habits  : NEGATIVE for frequency, dysuria, or hematuria  MUSCULOSKELETAL: NEGATIVE for significant arthralgias or myalgia  NEURO: NEGATIVE for weakness, dizziness or paresthesias  ENDOCRINE: NEGATIVE for temperature intolerance, skin/hair changes  HEME: NEGATIVE for bleeding problems  PSYCHIATRIC: NEGATIVE for changes in mood or affect    EXAM:   /80 (BP Location: Right arm, Patient Position: Sitting, Cuff Size: Adult Regular)   Pulse 98   Temp 96.7  F (35.9  C) (Oral)   Ht 1.638 m (5' 4.5\")   Wt 65.2 kg (143 lb 11.2 oz)   SpO2 97%   BMI 24.29 kg/m        GENERAL APPEARANCE: healthy, alert and no distress     EYES: " EOMI, PERRL     HENT: ear canals and TM's normal and nose and mouth without ulcers or lesions     NECK: no adenopathy, no asymmetry, masses, or scars and thyroid normal to palpation     RESP: lungs clear to auscultation - no rales, rhonchi or wheezes     CV: regular rates and rhythm, normal S1 S2, no S3 or S4 and no murmur, click or rub     ABDOMEN:  soft, nontender, no HSM or masses and bowel sounds normal     MS: extremities left arm in sling, some mild edema of hand, good CMS     SKIN: ecchymosis of dorsum of left hand     NEURO:, sensory exam grossly normal, mentation intact and speech normal     PSYCH: mentation appears normal. and affect normal/bright     LYMPHATICS: No cervical adenopathy    DIAGNOSTICS:   EKG:Sinus  Rhythm   WITHIN NORMAL LIMITS      Recent Labs   Lab Test 06/19/18  0754 01/31/18  0810  06/16/17  0743   HGB 13.4 13.2   < > 12.2     --   --  239     --   --  141   POTASSIUM 4.1  --   --  4.4   CR 0.76  --   --  0.77    < > = values in this interval not displayed.      Results for orders placed or performed in visit on 03/11/19   CBC with platelets and differential   Result Value Ref Range    WBC 8.4 4.0 - 11.0 10e9/L    RBC Count 3.94 3.8 - 5.2 10e12/L    Hemoglobin 12.4 11.7 - 15.7 g/dL    Hematocrit 38.2 35.0 - 47.0 %    MCV 97 78 - 100 fl    MCH 31.5 26.5 - 33.0 pg    MCHC 32.5 31.5 - 36.5 g/dL    RDW 13.3 10.0 - 15.0 %    Platelet Count 276 150 - 450 10e9/L    % Neutrophils 63.3 %    % Lymphocytes 22.8 %    % Monocytes 8.8 %    % Eosinophils 4.5 %    % Basophils 0.6 %    Absolute Neutrophil 5.3 1.6 - 8.3 10e9/L    Absolute Lymphocytes 1.9 0.8 - 5.3 10e9/L    Absolute Monocytes 0.7 0.0 - 1.3 10e9/L    Absolute Eosinophils 0.4 0.0 - 0.7 10e9/L    Absolute Basophils 0.1 0.0 - 0.2 10e9/L    Diff Method Automated Method    Basic metabolic panel   Result Value Ref Range    Sodium 139 133 - 144 mmol/L    Potassium 3.9 3.4 - 5.3 mmol/L    Chloride 103 94 - 109 mmol/L    Carbon  Dioxide 31 20 - 32 mmol/L    Anion Gap 5 3 - 14 mmol/L    Glucose 83 70 - 99 mg/dL    Urea Nitrogen 13 7 - 30 mg/dL    Creatinine 0.66 0.52 - 1.04 mg/dL    GFR Estimate 81 >60 mL/min/[1.73_m2]    GFR Estimate If Black >90 >60 mL/min/[1.73_m2]    Calcium 9.6 8.5 - 10.1 mg/dL     IMPRESSION:   Reason for surgery/procedure: left humeral fracture    Diagnosis/reason for consult: pre op consult    The proposed surgical procedure is considered INTERMEDIATE risk.    REVISED CARDIAC RISK INDEX  The patient has the following serious cardiovascular risks for perioperative complications such as (MI, PE, VFib and 3  AV Block):  No serious cardiac risks  INTERPRETATION: 0 risks: Class I (very low risk - 0.4% complication rate)    The patient has the following additional risks for perioperative complications:  No identified additional risks      ICD-10-CM    1. Preop general physical exam Z01.818 CBC with platelets and differential     Basic metabolic panel     EKG 12-lead complete w/read - Clinics   2. 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    APPROVAL GIVEN to proceed with proposed procedure, without further diagnostic evaluation       Signed Electronically by: MUNA Rojas CNP    Copy of this evaluation report is provided to requesting physician.    [facility] Preop Guidelines    Revised Cardiac Risk Index

## 2019-03-12 ENCOUNTER — TRANSFERRED RECORDS (OUTPATIENT)
Dept: HEALTH INFORMATION MANAGEMENT | Facility: CLINIC | Age: 84
End: 2019-03-12

## 2019-03-14 ENCOUNTER — HOSPITAL ENCOUNTER (INPATIENT)
Facility: CLINIC | Age: 84
LOS: 2 days | Discharge: HOME | DRG: 483 | End: 2019-03-16
Attending: ORTHOPAEDIC SURGERY | Admitting: ORTHOPAEDIC SURGERY
Payer: MEDICARE

## 2019-03-14 ENCOUNTER — APPOINTMENT (OUTPATIENT)
Dept: GENERAL RADIOLOGY | Facility: CLINIC | Age: 84
DRG: 483 | End: 2019-03-14
Attending: ORTHOPAEDIC SURGERY
Payer: MEDICARE

## 2019-03-14 ENCOUNTER — ANESTHESIA (OUTPATIENT)
Dept: SURGERY | Facility: CLINIC | Age: 84
DRG: 483 | End: 2019-03-14
Payer: MEDICARE

## 2019-03-14 ENCOUNTER — ANESTHESIA EVENT (OUTPATIENT)
Dept: SURGERY | Facility: CLINIC | Age: 84
DRG: 483 | End: 2019-03-14
Payer: MEDICARE

## 2019-03-14 DIAGNOSIS — S42.202K CLOSED FRACTURE OF PROXIMAL END OF LEFT HUMERUS WITH NONUNION, UNSPECIFIED FRACTURE MORPHOLOGY, SUBSEQUENT ENCOUNTER: ICD-10-CM

## 2019-03-14 DIAGNOSIS — S42.292A CLOSED 4-PART FRACTURE OF PROXIMAL HUMERUS, LEFT, INITIAL ENCOUNTER: Primary | ICD-10-CM

## 2019-03-14 LAB
ABO + RH BLD: NORMAL
ABO + RH BLD: NORMAL
ANION GAP SERPL CALCULATED.3IONS-SCNC: 7 MMOL/L (ref 3–14)
BASOPHILS # BLD AUTO: 0 10E9/L (ref 0–0.2)
BASOPHILS NFR BLD AUTO: 0.1 %
BLD GP AB SCN SERPL QL: NORMAL
BLOOD BANK CMNT PATIENT-IMP: NORMAL
BUN SERPL-MCNC: 13 MG/DL (ref 7–30)
CALCIUM SERPL-MCNC: 8.4 MG/DL (ref 8.5–10.1)
CHLORIDE SERPL-SCNC: 106 MMOL/L (ref 94–109)
CO2 SERPL-SCNC: 27 MMOL/L (ref 20–32)
CREAT SERPL-MCNC: 0.63 MG/DL (ref 0.52–1.04)
DIFFERENTIAL METHOD BLD: ABNORMAL
EOSINOPHIL # BLD AUTO: 0 10E9/L (ref 0–0.7)
EOSINOPHIL NFR BLD AUTO: 0 %
ERYTHROCYTE [DISTWIDTH] IN BLOOD BY AUTOMATED COUNT: 13.4 % (ref 10–15)
GFR SERPL CREATININE-BSD FRML MDRD: 81 ML/MIN/{1.73_M2}
GLUCOSE SERPL-MCNC: 147 MG/DL (ref 70–99)
HCT VFR BLD AUTO: 32.2 % (ref 35–47)
HGB BLD-MCNC: 10.7 G/DL (ref 11.7–15.7)
IMM GRANULOCYTES # BLD: 0 10E9/L (ref 0–0.4)
IMM GRANULOCYTES NFR BLD: 0.3 %
LYMPHOCYTES # BLD AUTO: 0.6 10E9/L (ref 0.8–5.3)
LYMPHOCYTES NFR BLD AUTO: 6 %
MCH RBC QN AUTO: 31.1 PG (ref 26.5–33)
MCHC RBC AUTO-ENTMCNC: 33.2 G/DL (ref 31.5–36.5)
MCV RBC AUTO: 94 FL (ref 78–100)
MONOCYTES # BLD AUTO: 0.3 10E9/L (ref 0–1.3)
MONOCYTES NFR BLD AUTO: 3.4 %
NEUTROPHILS # BLD AUTO: 8.4 10E9/L (ref 1.6–8.3)
NEUTROPHILS NFR BLD AUTO: 90.2 %
NRBC # BLD AUTO: 0 10*3/UL
NRBC BLD AUTO-RTO: 0 /100
PLATELET # BLD AUTO: 248 10E9/L (ref 150–450)
POTASSIUM SERPL-SCNC: 4 MMOL/L (ref 3.4–5.3)
POTASSIUM SERPL-SCNC: 4.2 MMOL/L (ref 3.4–5.3)
RBC # BLD AUTO: 3.44 10E12/L (ref 3.8–5.2)
SODIUM SERPL-SCNC: 140 MMOL/L (ref 133–144)
SPECIMEN EXP DATE BLD: NORMAL
WBC # BLD AUTO: 9.3 10E9/L (ref 4–11)

## 2019-03-14 PROCEDURE — 40000277 XR SURGERY CARM FLUORO LESS THAN 5 MIN W STILLS

## 2019-03-14 PROCEDURE — 71000012 ZZH RECOVERY PHASE 1 LEVEL 1 FIRST HR: Performed by: ORTHOPAEDIC SURGERY

## 2019-03-14 PROCEDURE — 37000008 ZZH ANESTHESIA TECHNICAL FEE, 1ST 30 MIN: Performed by: ORTHOPAEDIC SURGERY

## 2019-03-14 PROCEDURE — 25800030 ZZH RX IP 258 OP 636: Performed by: ANESTHESIOLOGY

## 2019-03-14 PROCEDURE — 0PSG04Z REPOSITION LEFT HUMERAL SHAFT WITH INTERNAL FIXATION DEVICE, OPEN APPROACH: ICD-10-PCS | Performed by: ORTHOPAEDIC SURGERY

## 2019-03-14 PROCEDURE — 37000009 ZZH ANESTHESIA TECHNICAL FEE, EACH ADDTL 15 MIN: Performed by: ORTHOPAEDIC SURGERY

## 2019-03-14 PROCEDURE — 80048 BASIC METABOLIC PNL TOTAL CA: CPT | Performed by: PHYSICIAN ASSISTANT

## 2019-03-14 PROCEDURE — 27810169 ZZH OR IMPLANT GENERAL: Performed by: ORTHOPAEDIC SURGERY

## 2019-03-14 PROCEDURE — 27110028 ZZH OR GENERAL SUPPLY NON-STERILE: Performed by: ORTHOPAEDIC SURGERY

## 2019-03-14 PROCEDURE — 25000128 H RX IP 250 OP 636: Performed by: ORTHOPAEDIC SURGERY

## 2019-03-14 PROCEDURE — 12000000 ZZH R&B MED SURG/OB

## 2019-03-14 PROCEDURE — 25800025 ZZH RX 258: Performed by: ORTHOPAEDIC SURGERY

## 2019-03-14 PROCEDURE — 71000013 ZZH RECOVERY PHASE 1 LEVEL 1 EA ADDTL HR: Performed by: ORTHOPAEDIC SURGERY

## 2019-03-14 PROCEDURE — C1713 ANCHOR/SCREW BN/BN,TIS/BN: HCPCS | Performed by: ORTHOPAEDIC SURGERY

## 2019-03-14 PROCEDURE — 86900 BLOOD TYPING SEROLOGIC ABO: CPT | Performed by: ORTHOPAEDIC SURGERY

## 2019-03-14 PROCEDURE — 25800030 ZZH RX IP 258 OP 636: Performed by: NURSE ANESTHETIST, CERTIFIED REGISTERED

## 2019-03-14 PROCEDURE — 25000125 ZZHC RX 250: Performed by: NURSE ANESTHETIST, CERTIFIED REGISTERED

## 2019-03-14 PROCEDURE — A9270 NON-COVERED ITEM OR SERVICE: HCPCS | Mod: GY | Performed by: ORTHOPAEDIC SURGERY

## 2019-03-14 PROCEDURE — A9270 NON-COVERED ITEM OR SERVICE: HCPCS | Mod: GY | Performed by: INTERNAL MEDICINE

## 2019-03-14 PROCEDURE — 25000128 H RX IP 250 OP 636: Performed by: NURSE ANESTHETIST, CERTIFIED REGISTERED

## 2019-03-14 PROCEDURE — 27210794 ZZH OR GENERAL SUPPLY STERILE: Performed by: ORTHOPAEDIC SURGERY

## 2019-03-14 PROCEDURE — 0LS40ZZ REPOSITION LEFT UPPER ARM TENDON, OPEN APPROACH: ICD-10-PCS | Performed by: ORTHOPAEDIC SURGERY

## 2019-03-14 PROCEDURE — 36415 COLL VENOUS BLD VENIPUNCTURE: CPT | Performed by: ANESTHESIOLOGY

## 2019-03-14 PROCEDURE — 25000132 ZZH RX MED GY IP 250 OP 250 PS 637: Mod: GY | Performed by: ORTHOPAEDIC SURGERY

## 2019-03-14 PROCEDURE — 25000128 H RX IP 250 OP 636: Performed by: ANESTHESIOLOGY

## 2019-03-14 PROCEDURE — 85025 COMPLETE CBC W/AUTO DIFF WBC: CPT | Performed by: PHYSICIAN ASSISTANT

## 2019-03-14 PROCEDURE — 25000132 ZZH RX MED GY IP 250 OP 250 PS 637: Mod: GY | Performed by: INTERNAL MEDICINE

## 2019-03-14 PROCEDURE — 25000566 ZZH SEVOFLURANE, EA 15 MIN: Performed by: ORTHOPAEDIC SURGERY

## 2019-03-14 PROCEDURE — 86850 RBC ANTIBODY SCREEN: CPT | Performed by: ORTHOPAEDIC SURGERY

## 2019-03-14 PROCEDURE — 36000071 ZZH SURGERY LEVEL 5 W FLUORO 1ST 30 MIN: Performed by: ORTHOPAEDIC SURGERY

## 2019-03-14 PROCEDURE — L3670 SO ACRO/CLAV CAN WEB PRE OTS: HCPCS

## 2019-03-14 PROCEDURE — 36000069 ZZH SURGERY LEVEL 5 EA 15 ADDTL MIN: Performed by: ORTHOPAEDIC SURGERY

## 2019-03-14 PROCEDURE — 40000170 ZZH STATISTIC PRE-PROCEDURE ASSESSMENT II: Performed by: ORTHOPAEDIC SURGERY

## 2019-03-14 PROCEDURE — 25800030 ZZH RX IP 258 OP 636: Performed by: ORTHOPAEDIC SURGERY

## 2019-03-14 PROCEDURE — 86901 BLOOD TYPING SEROLOGIC RH(D): CPT | Performed by: ORTHOPAEDIC SURGERY

## 2019-03-14 PROCEDURE — C1776 JOINT DEVICE (IMPLANTABLE): HCPCS | Performed by: ORTHOPAEDIC SURGERY

## 2019-03-14 PROCEDURE — 40000986 XR SHOULDER LT PORT G/E 2 VW: Mod: LT

## 2019-03-14 PROCEDURE — 0RRK00Z REPLACEMENT OF LEFT SHOULDER JOINT WITH REVERSE BALL AND SOCKET SYNTHETIC SUBSTITUTE, OPEN APPROACH: ICD-10-PCS | Performed by: ORTHOPAEDIC SURGERY

## 2019-03-14 PROCEDURE — 84132 ASSAY OF SERUM POTASSIUM: CPT | Performed by: ANESTHESIOLOGY

## 2019-03-14 PROCEDURE — 36415 COLL VENOUS BLD VENIPUNCTURE: CPT | Performed by: PHYSICIAN ASSISTANT

## 2019-03-14 DEVICE — CEMENT RESTRICTOR 24MM EBO101: Type: IMPLANTABLE DEVICE | Site: SHOULDER | Status: FUNCTIONAL

## 2019-03-14 DEVICE — IMP SHOULDER HUMERAL HEAD METAPHYSIS 36MM DWB960: Type: IMPLANTABLE DEVICE | Site: SHOULDER | Status: FUNCTIONAL

## 2019-03-14 DEVICE — IMP SCR LOCKING 4.5X20MM AQLS DWD020: Type: IMPLANTABLE DEVICE | Site: SHOULDER | Status: FUNCTIONAL

## 2019-03-14 DEVICE — IMPLANTABLE DEVICE: Type: IMPLANTABLE DEVICE | Site: SHOULDER | Status: FUNCTIONAL

## 2019-03-14 DEVICE — IMP SHOULDER HUMERAL HEAD +9MM 36MM DWB994: Type: IMPLANTABLE DEVICE | Site: SHOULDER | Status: FUNCTIONAL

## 2019-03-14 DEVICE — IMP SCR LOCKING 4.5X32MM AQLS DWD032: Type: IMPLANTABLE DEVICE | Site: SHOULDER | Status: FUNCTIONAL

## 2019-03-14 DEVICE — BONE CEMENT STRK SIMPLEX P SPEEDSET 6192-1-001: Type: IMPLANTABLE DEVICE | Site: SHOULDER | Status: FUNCTIONAL

## 2019-03-14 DEVICE — IMP SCR FIXATION 4.5X26MM AQLS VDV226: Type: IMPLANTABLE DEVICE | Site: SHOULDER | Status: FUNCTIONAL

## 2019-03-14 DEVICE — IMP BASEPLATE SHLDR GLENOID AQLS REV II 25MM DWD170: Type: IMPLANTABLE DEVICE | Site: SHOULDER | Status: FUNCTIONAL

## 2019-03-14 RX ORDER — FENTANYL CITRATE 50 UG/ML
25-50 INJECTION, SOLUTION INTRAMUSCULAR; INTRAVENOUS
Status: DISCONTINUED | OUTPATIENT
Start: 2019-03-14 | End: 2019-03-14 | Stop reason: HOSPADM

## 2019-03-14 RX ORDER — EPHEDRINE SULFATE 50 MG/ML
INJECTION, SOLUTION INTRAMUSCULAR; INTRAVENOUS; SUBCUTANEOUS PRN
Status: DISCONTINUED | OUTPATIENT
Start: 2019-03-14 | End: 2019-03-14

## 2019-03-14 RX ORDER — HYDROMORPHONE HYDROCHLORIDE 1 MG/ML
.3-.5 INJECTION, SOLUTION INTRAMUSCULAR; INTRAVENOUS; SUBCUTANEOUS EVERY 5 MIN PRN
Status: DISCONTINUED | OUTPATIENT
Start: 2019-03-14 | End: 2019-03-14 | Stop reason: HOSPADM

## 2019-03-14 RX ORDER — PROPOFOL 10 MG/ML
INJECTION, EMULSION INTRAVENOUS PRN
Status: DISCONTINUED | OUTPATIENT
Start: 2019-03-14 | End: 2019-03-14

## 2019-03-14 RX ORDER — OXYCODONE HYDROCHLORIDE 5 MG/1
5-10 TABLET ORAL EVERY 4 HOURS PRN
Status: DISCONTINUED | OUTPATIENT
Start: 2019-03-14 | End: 2019-03-16 | Stop reason: HOSPADM

## 2019-03-14 RX ORDER — IRBESARTAN AND HYDROCHLOROTHIAZIDE 150; 12.5 MG/1; MG/1
1 TABLET, FILM COATED ORAL DAILY
Status: DISCONTINUED | OUTPATIENT
Start: 2019-03-14 | End: 2019-03-14 | Stop reason: CLARIF

## 2019-03-14 RX ORDER — ACETAMINOPHEN 325 MG/1
650 TABLET ORAL EVERY 4 HOURS PRN
Status: DISCONTINUED | OUTPATIENT
Start: 2019-03-17 | End: 2019-03-14

## 2019-03-14 RX ORDER — ASCORBIC ACID 500 MG
500 TABLET ORAL EVERY OTHER DAY
Status: DISCONTINUED | OUTPATIENT
Start: 2019-03-14 | End: 2019-03-16 | Stop reason: HOSPADM

## 2019-03-14 RX ORDER — SODIUM CHLORIDE, SODIUM LACTATE, POTASSIUM CHLORIDE, CALCIUM CHLORIDE 600; 310; 30; 20 MG/100ML; MG/100ML; MG/100ML; MG/100ML
INJECTION, SOLUTION INTRAVENOUS CONTINUOUS
Status: DISCONTINUED | OUTPATIENT
Start: 2019-03-14 | End: 2019-03-16 | Stop reason: HOSPADM

## 2019-03-14 RX ORDER — CEFAZOLIN SODIUM 2 G/100ML
2 INJECTION, SOLUTION INTRAVENOUS
Status: COMPLETED | OUTPATIENT
Start: 2019-03-14 | End: 2019-03-14

## 2019-03-14 RX ORDER — HYDROCHLOROTHIAZIDE 12.5 MG/1
12.5 CAPSULE ORAL DAILY
Status: DISCONTINUED | OUTPATIENT
Start: 2019-03-14 | End: 2019-03-14

## 2019-03-14 RX ORDER — VANCOMYCIN HYDROCHLORIDE 1 G/20ML
INJECTION, POWDER, LYOPHILIZED, FOR SOLUTION INTRAVENOUS PRN
Status: DISCONTINUED | OUTPATIENT
Start: 2019-03-14 | End: 2019-03-14 | Stop reason: HOSPADM

## 2019-03-14 RX ORDER — CEFAZOLIN SODIUM 1 G/3ML
1 INJECTION, POWDER, FOR SOLUTION INTRAMUSCULAR; INTRAVENOUS EVERY 8 HOURS
Status: COMPLETED | OUTPATIENT
Start: 2019-03-14 | End: 2019-03-15

## 2019-03-14 RX ORDER — ONDANSETRON 4 MG/1
4 TABLET, ORALLY DISINTEGRATING ORAL EVERY 30 MIN PRN
Status: DISCONTINUED | OUTPATIENT
Start: 2019-03-14 | End: 2019-03-14 | Stop reason: HOSPADM

## 2019-03-14 RX ORDER — ONDANSETRON 2 MG/ML
INJECTION INTRAMUSCULAR; INTRAVENOUS PRN
Status: DISCONTINUED | OUTPATIENT
Start: 2019-03-14 | End: 2019-03-14

## 2019-03-14 RX ORDER — LABETALOL 20 MG/4 ML (5 MG/ML) INTRAVENOUS SYRINGE
10
Status: DISCONTINUED | OUTPATIENT
Start: 2019-03-14 | End: 2019-03-14 | Stop reason: HOSPADM

## 2019-03-14 RX ORDER — HYDROMORPHONE HYDROCHLORIDE 1 MG/ML
.3-.5 INJECTION, SOLUTION INTRAMUSCULAR; INTRAVENOUS; SUBCUTANEOUS
Status: DISCONTINUED | OUTPATIENT
Start: 2019-03-14 | End: 2019-03-16 | Stop reason: HOSPADM

## 2019-03-14 RX ORDER — AMOXICILLIN 250 MG
2 CAPSULE ORAL 2 TIMES DAILY
Status: DISCONTINUED | OUTPATIENT
Start: 2019-03-14 | End: 2019-03-16 | Stop reason: HOSPADM

## 2019-03-14 RX ORDER — SODIUM CHLORIDE, SODIUM LACTATE, POTASSIUM CHLORIDE, CALCIUM CHLORIDE 600; 310; 30; 20 MG/100ML; MG/100ML; MG/100ML; MG/100ML
INJECTION, SOLUTION INTRAVENOUS CONTINUOUS
Status: DISCONTINUED | OUTPATIENT
Start: 2019-03-14 | End: 2019-03-14 | Stop reason: HOSPADM

## 2019-03-14 RX ORDER — AMOXICILLIN 250 MG
1 CAPSULE ORAL 2 TIMES DAILY
Status: DISCONTINUED | OUTPATIENT
Start: 2019-03-14 | End: 2019-03-16 | Stop reason: HOSPADM

## 2019-03-14 RX ORDER — CEFAZOLIN SODIUM 1 G/3ML
1 INJECTION, POWDER, FOR SOLUTION INTRAMUSCULAR; INTRAVENOUS SEE ADMIN INSTRUCTIONS
Status: DISCONTINUED | OUTPATIENT
Start: 2019-03-14 | End: 2019-03-14 | Stop reason: HOSPADM

## 2019-03-14 RX ORDER — KETOROLAC TROMETHAMINE 30 MG/ML
30 INJECTION, SOLUTION INTRAMUSCULAR; INTRAVENOUS EVERY 6 HOURS PRN
Status: DISCONTINUED | OUTPATIENT
Start: 2019-03-14 | End: 2019-03-14 | Stop reason: HOSPADM

## 2019-03-14 RX ORDER — IRBESARTAN 150 MG/1
150 TABLET ORAL DAILY
Status: DISCONTINUED | OUTPATIENT
Start: 2019-03-14 | End: 2019-03-14

## 2019-03-14 RX ORDER — NALOXONE HYDROCHLORIDE 0.4 MG/ML
.1-.4 INJECTION, SOLUTION INTRAMUSCULAR; INTRAVENOUS; SUBCUTANEOUS
Status: DISCONTINUED | OUTPATIENT
Start: 2019-03-14 | End: 2019-03-16 | Stop reason: HOSPADM

## 2019-03-14 RX ORDER — NEOSTIGMINE METHYLSULFATE 1 MG/ML
VIAL (ML) INJECTION PRN
Status: DISCONTINUED | OUTPATIENT
Start: 2019-03-14 | End: 2019-03-14

## 2019-03-14 RX ORDER — FENTANYL CITRATE 50 UG/ML
INJECTION, SOLUTION INTRAMUSCULAR; INTRAVENOUS PRN
Status: DISCONTINUED | OUTPATIENT
Start: 2019-03-14 | End: 2019-03-14

## 2019-03-14 RX ORDER — POLYETHYLENE GLYCOL 3350 17 G/17G
1 POWDER, FOR SOLUTION ORAL DAILY PRN
COMMUNITY

## 2019-03-14 RX ORDER — BENZTROPINE MESYLATE 1 MG/1
1-2 TABLET ORAL 3 TIMES DAILY PRN
Status: DISCONTINUED | OUTPATIENT
Start: 2019-03-14 | End: 2019-03-16 | Stop reason: HOSPADM

## 2019-03-14 RX ORDER — FENTANYL CITRATE 50 UG/ML
25-100 INJECTION, SOLUTION INTRAMUSCULAR; INTRAVENOUS
Status: DISCONTINUED | OUTPATIENT
Start: 2019-03-14 | End: 2019-03-14 | Stop reason: HOSPADM

## 2019-03-14 RX ORDER — ONDANSETRON 2 MG/ML
4 INJECTION INTRAMUSCULAR; INTRAVENOUS EVERY 6 HOURS PRN
Status: DISCONTINUED | OUTPATIENT
Start: 2019-03-14 | End: 2019-03-16 | Stop reason: HOSPADM

## 2019-03-14 RX ORDER — DEXAMETHASONE SODIUM PHOSPHATE 4 MG/ML
INJECTION, SOLUTION INTRA-ARTICULAR; INTRALESIONAL; INTRAMUSCULAR; INTRAVENOUS; SOFT TISSUE PRN
Status: DISCONTINUED | OUTPATIENT
Start: 2019-03-14 | End: 2019-03-14

## 2019-03-14 RX ORDER — SIMVASTATIN 20 MG
20 TABLET ORAL EVERY EVENING
Status: DISCONTINUED | OUTPATIENT
Start: 2019-03-14 | End: 2019-03-16 | Stop reason: HOSPADM

## 2019-03-14 RX ORDER — ACETAMINOPHEN 325 MG/1
975 TABLET ORAL EVERY 8 HOURS
Status: DISCONTINUED | OUTPATIENT
Start: 2019-03-14 | End: 2019-03-16 | Stop reason: HOSPADM

## 2019-03-14 RX ORDER — ONDANSETRON 2 MG/ML
4 INJECTION INTRAMUSCULAR; INTRAVENOUS EVERY 30 MIN PRN
Status: DISCONTINUED | OUTPATIENT
Start: 2019-03-14 | End: 2019-03-14 | Stop reason: HOSPADM

## 2019-03-14 RX ORDER — NALOXONE HYDROCHLORIDE 0.4 MG/ML
.1-.4 INJECTION, SOLUTION INTRAMUSCULAR; INTRAVENOUS; SUBCUTANEOUS
Status: ACTIVE | OUTPATIENT
Start: 2019-03-14 | End: 2019-03-15

## 2019-03-14 RX ORDER — ASCORBIC ACID 500 MG
500 TABLET ORAL EVERY OTHER DAY
COMMUNITY
End: 2024-04-23

## 2019-03-14 RX ORDER — ACETAMINOPHEN 500 MG
1000 TABLET ORAL 4 TIMES DAILY PRN
COMMUNITY

## 2019-03-14 RX ORDER — GLYCOPYRROLATE 0.2 MG/ML
INJECTION, SOLUTION INTRAMUSCULAR; INTRAVENOUS PRN
Status: DISCONTINUED | OUTPATIENT
Start: 2019-03-14 | End: 2019-03-14

## 2019-03-14 RX ORDER — HYDROCHLOROTHIAZIDE 12.5 MG/1
12.5 TABLET ORAL DAILY
Status: DISCONTINUED | OUTPATIENT
Start: 2019-03-14 | End: 2019-03-14

## 2019-03-14 RX ORDER — ACETAMINOPHEN 500 MG
1000 TABLET ORAL 4 TIMES DAILY PRN
Status: DISCONTINUED | OUTPATIENT
Start: 2019-03-14 | End: 2019-03-16 | Stop reason: HOSPADM

## 2019-03-14 RX ORDER — PROPOFOL 10 MG/ML
INJECTION, EMULSION INTRAVENOUS CONTINUOUS PRN
Status: DISCONTINUED | OUTPATIENT
Start: 2019-03-14 | End: 2019-03-14

## 2019-03-14 RX ORDER — HYDRALAZINE HYDROCHLORIDE 20 MG/ML
10 INJECTION INTRAMUSCULAR; INTRAVENOUS EVERY 4 HOURS PRN
Status: DISCONTINUED | OUTPATIENT
Start: 2019-03-14 | End: 2019-03-16 | Stop reason: HOSPADM

## 2019-03-14 RX ORDER — VECURONIUM BROMIDE 1 MG/ML
INJECTION, POWDER, LYOPHILIZED, FOR SOLUTION INTRAVENOUS PRN
Status: DISCONTINUED | OUTPATIENT
Start: 2019-03-14 | End: 2019-03-14

## 2019-03-14 RX ORDER — ONDANSETRON 4 MG/1
4 TABLET, ORALLY DISINTEGRATING ORAL EVERY 6 HOURS PRN
Status: DISCONTINUED | OUTPATIENT
Start: 2019-03-14 | End: 2019-03-16 | Stop reason: HOSPADM

## 2019-03-14 RX ORDER — LIDOCAINE 40 MG/G
CREAM TOPICAL
Status: DISCONTINUED | OUTPATIENT
Start: 2019-03-14 | End: 2019-03-16 | Stop reason: HOSPADM

## 2019-03-14 RX ORDER — POLYETHYLENE GLYCOL 3350 17 G/17G
17 POWDER, FOR SOLUTION ORAL DAILY PRN
Status: DISCONTINUED | OUTPATIENT
Start: 2019-03-14 | End: 2019-03-16 | Stop reason: HOSPADM

## 2019-03-14 RX ADMIN — GLYCOPYRROLATE 0.4 MG: 0.2 INJECTION, SOLUTION INTRAMUSCULAR; INTRAVENOUS at 10:24

## 2019-03-14 RX ADMIN — Medication 0.5 MG: at 13:12

## 2019-03-14 RX ADMIN — Medication 0.25 MG: at 11:22

## 2019-03-14 RX ADMIN — PHENYLEPHRINE HYDROCHLORIDE 100 MCG: 10 INJECTION, SOLUTION INTRAMUSCULAR; INTRAVENOUS; SUBCUTANEOUS at 08:51

## 2019-03-14 RX ADMIN — OXYCODONE HYDROCHLORIDE AND ACETAMINOPHEN 500 MG: 500 TABLET ORAL at 13:13

## 2019-03-14 RX ADMIN — PROPOFOL 120 MG: 10 INJECTION, EMULSION INTRAVENOUS at 07:36

## 2019-03-14 RX ADMIN — ASPIRIN 325 MG: 325 TABLET, DELAYED RELEASE ORAL at 19:55

## 2019-03-14 RX ADMIN — Medication 2 TABLET: at 19:55

## 2019-03-14 RX ADMIN — Medication 5 MG: at 08:26

## 2019-03-14 RX ADMIN — KETOROLAC TROMETHAMINE 30 MG: 30 INJECTION, SOLUTION INTRAMUSCULAR; INTRAVENOUS at 11:10

## 2019-03-14 RX ADMIN — FENTANYL CITRATE 25 MCG: 50 INJECTION, SOLUTION INTRAMUSCULAR; INTRAVENOUS at 11:02

## 2019-03-14 RX ADMIN — ACETAMINOPHEN 975 MG: 325 TABLET, FILM COATED ORAL at 20:58

## 2019-03-14 RX ADMIN — NEOSTIGMINE METHYLSULFATE 3 MG: 1 INJECTION, SOLUTION INTRAVENOUS at 10:24

## 2019-03-14 RX ADMIN — CEFAZOLIN 1 G: 1 INJECTION, POWDER, FOR SOLUTION INTRAMUSCULAR; INTRAVENOUS at 18:21

## 2019-03-14 RX ADMIN — PROPOFOL 20 MCG/KG/MIN: 10 INJECTION, EMULSION INTRAVENOUS at 08:12

## 2019-03-14 RX ADMIN — PHENYLEPHRINE HYDROCHLORIDE 100 MCG: 10 INJECTION, SOLUTION INTRAMUSCULAR; INTRAVENOUS; SUBCUTANEOUS at 07:43

## 2019-03-14 RX ADMIN — ROCURONIUM BROMIDE 10 MG: 10 INJECTION INTRAVENOUS at 08:31

## 2019-03-14 RX ADMIN — ACETAMINOPHEN 975 MG: 325 TABLET, FILM COATED ORAL at 13:12

## 2019-03-14 RX ADMIN — FENTANYL CITRATE 50 MCG: 50 INJECTION, SOLUTION INTRAMUSCULAR; INTRAVENOUS at 07:45

## 2019-03-14 RX ADMIN — SENNOSIDES AND DOCUSATE SODIUM 1 TABLET: 8.6; 5 TABLET ORAL at 13:13

## 2019-03-14 RX ADMIN — PHENYLEPHRINE HYDROCHLORIDE 0.25 MCG/KG/MIN: 10 INJECTION, SOLUTION INTRAMUSCULAR; INTRAVENOUS; SUBCUTANEOUS at 08:38

## 2019-03-14 RX ADMIN — PHENYLEPHRINE HYDROCHLORIDE 50 MCG: 10 INJECTION, SOLUTION INTRAMUSCULAR; INTRAVENOUS; SUBCUTANEOUS at 10:40

## 2019-03-14 RX ADMIN — PHENYLEPHRINE HYDROCHLORIDE 50 MCG: 10 INJECTION, SOLUTION INTRAMUSCULAR; INTRAVENOUS; SUBCUTANEOUS at 08:34

## 2019-03-14 RX ADMIN — PROPOFOL 40 MG: 10 INJECTION, EMULSION INTRAVENOUS at 07:47

## 2019-03-14 RX ADMIN — FENTANYL CITRATE 50 MCG: 50 INJECTION, SOLUTION INTRAMUSCULAR; INTRAVENOUS at 10:49

## 2019-03-14 RX ADMIN — HYDROMORPHONE HYDROCHLORIDE 0.2 MG: 1 INJECTION, SOLUTION INTRAMUSCULAR; INTRAVENOUS; SUBCUTANEOUS at 10:18

## 2019-03-14 RX ADMIN — Medication 1 MG: at 23:50

## 2019-03-14 RX ADMIN — PHENYLEPHRINE HYDROCHLORIDE 50 MCG: 10 INJECTION, SOLUTION INTRAMUSCULAR; INTRAVENOUS; SUBCUTANEOUS at 08:29

## 2019-03-14 RX ADMIN — OXYCODONE HYDROCHLORIDE 5 MG: 5 TABLET ORAL at 18:01

## 2019-03-14 RX ADMIN — FENTANYL CITRATE 25 MCG: 50 INJECTION, SOLUTION INTRAMUSCULAR; INTRAVENOUS at 11:18

## 2019-03-14 RX ADMIN — PHENYLEPHRINE HYDROCHLORIDE 100 MCG: 10 INJECTION, SOLUTION INTRAMUSCULAR; INTRAVENOUS; SUBCUTANEOUS at 10:42

## 2019-03-14 RX ADMIN — CEFAZOLIN SODIUM 2 G: 2 INJECTION, SOLUTION INTRAVENOUS at 07:50

## 2019-03-14 RX ADMIN — VECURONIUM BROMIDE 1 MG: 1 INJECTION, POWDER, LYOPHILIZED, FOR SOLUTION INTRAVENOUS at 09:17

## 2019-03-14 RX ADMIN — SODIUM CHLORIDE, POTASSIUM CHLORIDE, SODIUM LACTATE AND CALCIUM CHLORIDE: 600; 310; 30; 20 INJECTION, SOLUTION INTRAVENOUS at 06:42

## 2019-03-14 RX ADMIN — SIMVASTATIN 20 MG: 20 TABLET, FILM COATED ORAL at 19:56

## 2019-03-14 RX ADMIN — PROPOFOL 40 MG: 10 INJECTION, EMULSION INTRAVENOUS at 07:38

## 2019-03-14 RX ADMIN — PSYLLIUM HUSK 1 PACKET: 3.4 POWDER ORAL at 19:55

## 2019-03-14 RX ADMIN — OXYCODONE HYDROCHLORIDE 5 MG: 5 TABLET ORAL at 23:30

## 2019-03-14 RX ADMIN — Medication 0.25 MG: at 11:41

## 2019-03-14 RX ADMIN — DEXAMETHASONE SODIUM PHOSPHATE 8 MG: 4 INJECTION, SOLUTION INTRA-ARTICULAR; INTRALESIONAL; INTRAMUSCULAR; INTRAVENOUS; SOFT TISSUE at 08:15

## 2019-03-14 RX ADMIN — PHENYLEPHRINE HYDROCHLORIDE 50 MCG: 10 INJECTION, SOLUTION INTRAMUSCULAR; INTRAVENOUS; SUBCUTANEOUS at 08:27

## 2019-03-14 RX ADMIN — CEFAZOLIN SODIUM 1 G: 2 INJECTION, SOLUTION INTRAVENOUS at 09:50

## 2019-03-14 RX ADMIN — SODIUM CHLORIDE, POTASSIUM CHLORIDE, SODIUM LACTATE AND CALCIUM CHLORIDE: 600; 310; 30; 20 INJECTION, SOLUTION INTRAVENOUS at 13:15

## 2019-03-14 RX ADMIN — ROCURONIUM BROMIDE 40 MG: 10 INJECTION INTRAVENOUS at 07:38

## 2019-03-14 RX ADMIN — SODIUM CHLORIDE, POTASSIUM CHLORIDE, SODIUM LACTATE AND CALCIUM CHLORIDE: 600; 310; 30; 20 INJECTION, SOLUTION INTRAVENOUS at 08:42

## 2019-03-14 RX ADMIN — PHENYLEPHRINE HYDROCHLORIDE 50 MCG: 10 INJECTION, SOLUTION INTRAMUSCULAR; INTRAVENOUS; SUBCUTANEOUS at 08:38

## 2019-03-14 RX ADMIN — VECURONIUM BROMIDE 1 MG: 1 INJECTION, POWDER, LYOPHILIZED, FOR SOLUTION INTRAVENOUS at 09:45

## 2019-03-14 RX ADMIN — FENTANYL CITRATE 50 MCG: 50 INJECTION, SOLUTION INTRAMUSCULAR; INTRAVENOUS at 07:36

## 2019-03-14 RX ADMIN — SODIUM CHLORIDE, POTASSIUM CHLORIDE, SODIUM LACTATE AND CALCIUM CHLORIDE: 600; 310; 30; 20 INJECTION, SOLUTION INTRAVENOUS at 16:16

## 2019-03-14 RX ADMIN — HYDROMORPHONE HYDROCHLORIDE 0.3 MG: 1 INJECTION, SOLUTION INTRAMUSCULAR; INTRAVENOUS; SUBCUTANEOUS at 08:23

## 2019-03-14 RX ADMIN — ONDANSETRON 4 MG: 2 INJECTION INTRAMUSCULAR; INTRAVENOUS at 10:00

## 2019-03-14 ASSESSMENT — ACTIVITIES OF DAILY LIVING (ADL)
ADLS_ACUITY_SCORE: 17
SWALLOWING: 0-->SWALLOWS FOODS/LIQUIDS WITHOUT DIFFICULTY
ADLS_ACUITY_SCORE: 17

## 2019-03-14 ASSESSMENT — MIFFLIN-ST. JEOR: SCORE: 1079.57

## 2019-03-14 NOTE — PROGRESS NOTES
S. We received an order for an left shoulder Ultrasling III at the main OR.    O/goal. To reduce motion and help with post-op support    A. At this time, I have provided the main OR with a size medium [facility] Driver Ultrasling III.    P. Staffing have been instructed to contact our facility with any future questions and/or concerns.     Chadd RODRIGUEZ

## 2019-03-14 NOTE — ANESTHESIA PREPROCEDURE EVALUATION
Anesthesia Pre-Procedure Evaluation    Patient: Aysha Rose   MRN: [id number] : 1935          Preoperative Diagnosis: LEFT HUMERUS FRACTURE    Procedure(s):  LEFT REVERSE TOTAL SHOULDER  ARTHROPLASTY (TORINA AEQUALIS)^ LARGE C ARM    Past Medical History:   Diagnosis Date     Abdominal pain     ct abd and pelvis neg     Breast cancer (H)     lumpectomy and xrt Paula, got 5 years of arimidex     Erosive gastritis Oct 2011    by egd, colon as above, also small ulcer     High cholesterol      HTN (hypertension)     nl mr renogram     Hx of colonoscopy ,     nl,  with cecal angioect and 2mm polyp     [place] (iron deficiency anaemia) , 2016 colonoscopy cecal angioectasia, egd with hh, small ulcer     Microscopic hematuria     Dr. Alonzo, ct done 12 negative, cysto neg     Osteopenia     [place], fu  Paula and worse -1.5 left hip; fu here  a bit worse     Syncope     neg est echo     Past Surgical History:   Procedure Laterality Date     CATARACT IOL, RT/LT       LUMPECTOMY BREAST         Anesthesia Evaluation     . Pt has had prior anesthetic.     No history of anesthetic complications          ROS/MED HX    ENT/Pulmonary:      (-) sleep apnea   Neurologic:       Cardiovascular:     (+) Dyslipidemia, hypertension-range: very well controlled, ---. : . . . :. .       METS/Exercise Tolerance:  >4 METS   Hematologic:         Musculoskeletal:   (+) , , other musculoskeletal- she had a fall and injured shoulder      GI/Hepatic:        (-) GERD   Renal/Genitourinary:         Endo:         Psychiatric:         Infectious Disease:         Malignancy:         Other:                          Physical Exam  Normal systems: cardiovascular, pulmonary and dental    Airway   Mallampati: II  TM distance: >3 FB  Neck ROM: full    Dental     Cardiovascular       Pulmonary             Lab Results   Component Value Date    WBC 8.4 2019    HGB 12.4 2019    HCT  "38.2 03/11/2019     03/11/2019     03/11/2019    POTASSIUM 4.2 03/14/2019    CHLORIDE 103 03/11/2019    CO2 31 03/11/2019    BUN 13 03/11/2019    CR 0.66 03/11/2019    GLC 83 03/11/2019    ANTHONY 9.6 03/11/2019    PHOS 3.9 05/19/2014    ALBUMIN 3.5 06/19/2018    PROTTOTAL 6.8 06/19/2018    ALT 25 06/19/2018    AST 16 06/19/2018    ALKPHOS 42 06/19/2018    BILITOTAL 0.6 06/19/2018       Preop Vitals  BP Readings from Last 3 Encounters:   03/14/19 140/73   03/11/19 140/80   03/05/19 150/81    Pulse Readings from Last 3 Encounters:   03/11/19 98   03/05/19 84   07/24/18 80      Resp Readings from Last 3 Encounters:   03/14/19 18   03/05/19 18   09/13/17 16    SpO2 Readings from Last 3 Encounters:   03/14/19 98%   03/11/19 97%   03/05/19 98%      Temp Readings from Last 1 Encounters:   03/14/19 37  C (98.6  F) (Temporal)    Ht Readings from Last 1 Encounters:   03/11/19 1.638 m (5' 4.5\")      Wt Readings from Last 1 Encounters:   03/11/19 65.2 kg (143 lb 11.2 oz)    Estimated body mass index is 24.29 kg/m  as calculated from the following:    Height as of 3/11/19: 1.638 m (5' 4.5\").    Weight as of 3/11/19: 65.2 kg (143 lb 11.2 oz).       Anesthesia Plan      History & Physical Review  History and physical reviewed and following examination; no interval change.    ASA Status:  2 .    NPO Status:  > 8 hours    Plan for General and ETT with Intravenous induction. Maintenance will be Balanced.    PONV prophylaxis:  Ondansetron (or other 5HT-3) and Dexamethasone or Solumedrol  Zofran 4mg, decadron 8mg      Postoperative Care  Postoperative pain management:  IV analgesics.      Consents  Anesthetic plan, risks, benefits and alternatives discussed with:  Patient..                 Roney Valero MD  "

## 2019-03-14 NOTE — BRIEF OP NOTE
[facility]    Brief Operative Note    Pre-operative diagnosis: LEFT 4-PART PROXIMAL HUMERUS FRACTURE  Post-operative diagnosis SAME  Procedure: 1)  LEFT REVERSE TOTAL SHOULDER ARTHROPLASTY  2)  ORIF LEFT GREATER TUBEROSITY FRACTURE FRAGMENT  3)  LEFT LONG HEAD BICEPS TENODESIS  Surgeon: Surgeon(s) and Role:     * Eh Dobbs MD - Primary     * Vamshi Camargo PA-C - Assisting  Anesthesia: General   Estimated blood loss: 200 ml  Drains: Hemovac  Specimens: * No specimens in log *  Findings:   None.  Complications: None.  Implants: Materials Involved:  Metalic and Plastic  Grafts:  None.

## 2019-03-14 NOTE — PROGRESS NOTES
"/75   Pulse 89   Temp 98.9  F (37.2  C) (Oral)   Resp 18   Ht 1.626 m (5' 4\")   Wt 64 kg (141 lb)   SpO2 96%   BMI 24.20 kg/m      This is an 83 year old female with PMHx of breast cancer s/p lumpectomy, hyperlipideima, HTN and iron deficiency anemia who sustained a fall with resultant comminuted, impacted fracture of the proximal diaphysis of the right humerus. Pt underwent left reverse total shoulder arthroplasty with ORIF on 3/14. Surgery performed by Dr. Dobbs.  ccs. General anesthesia used. Pt was noted to be hypotensive following the procedure with BP 81/41, given a 500 ml bolus with improvement in pressures.   -- Continue PTA iron supplementation (pt takes every other day)  -- Continue Simvastatin 20 mg po every day  -- Hold irbesartan 150 mg po every day and hydrochlorothiazide 12.5 mg po every day given post-op hypotension, can restart in the AM pending renal fxn, PRN hydralazine available for SBP >180  --  mg po every day for DVT prophylaxis   -- Pt given intraoperative Ancef     Given the above, will defer formal consult. Routine post-operative cares, IVF, DVT prophylaxis, and pain management to orthopedic service. Recommend judicious use of narcotics given patient's age and risk for delirium. Will check some basic lab work in the AM to assess for acute blood loss anemia given surgery. PT and OT to assess per Ortho. Bowel regimen in place while on pain regimen. No changes to PTA medication regimen at discharge unless issues arise throughout stay.  Happy to be reconsulted in the future if necessary. Appreciate consult.     Hospitalist service will chart check BP in the AM and follow-up on BMP. Will restart irbesartan/HCTZ thereafter based on results.     "

## 2019-03-14 NOTE — PROGRESS NOTES
Admission medication history interview status for the 3/14/2019  admission is complete. See EPIC admission navigator for prior to admission medications     Medication history source reliability:Good    Medication history interview source(s):Patient    Medication history resources (including written lists, pill bottles, clinic record):Patient brought a list of medications with her    Primary pharmacy.[facility]    Additional medication history information not noted on PTA med list :None    Time spent in this activity: 40 minutes    Prior to Admission medications    Medication Sig Last Dose Taking? Auth Provider   acetaminophen (TYLENOL) 500 MG tablet Take 1,000 mg by mouth 4 times daily as needed for mild pain 3/13/2019 at 2100 Yes Reported, Patient   calcium-vitamin D (CALCIUM 600 + D) 600-400 MG-UNIT per tablet Take 2 tablets by mouth every evening  3/13/2019 at PM Yes Reported, Patient   ferrous sulfate ER (SLO-FE) 142 (45 Fe) MG CR tablet Take 142 mg by mouth every other day 3/12/2019 at AM Yes Reported, Patient   HYDROcodone-acetaminophen (NORCO) 5-325 MG tablet Take 1 tablet by mouth every 6 hours as needed for pain 3/12/2019 at PRN Yes Nilda Poe APRN CNP   irbesartan-hydrochlorothiazide (AVALIDE) 150-12.5 MG per tablet Take 1 tablet by mouth daily 3/13/2019 at AM Yes Agustin Peguero MD   polyethylene glycol (MIRALAX/GLYCOLAX) powder Take 1 capful by mouth daily as needed (While taking Norco) 3/12/2019 at PM Yes Reported, Patient   Psyllium (METAMUCIL FREE & NATURAL PO) Take 3 Tablespoonful by mouth every evening 3/13/2019 at PM Yes Reported, Patient   simvastatin (ZOCOR) 20 MG tablet TAKE 1 TABLET BY MOUTH EVERY DAY IN THE EVENING  Patient taking differently: Take 20 mg by mouth every evening TAKE 1 TABLET BY MOUTH EVERY DAY IN THE EVENING 3/13/2019 at PM Yes Agustin Peguero MD   vitamin C (ASCORBIC ACID) 500 MG tablet Take 500 mg by mouth every other day 3/12/2019 at AM Yes Reported,  Patient

## 2019-03-14 NOTE — ANESTHESIA POSTPROCEDURE EVALUATION
Patient: Aysha Rose    Procedure(s):  LEFT REVERSE TOTAL SHOULDER  ARTHROPLASTY    Diagnosis:LEFT HUMERUS FRACTURE  Diagnosis Additional Information: No value filed.    Anesthesia Type:  General, ETT    Note:  Anesthesia Post Evaluation    Patient location during evaluation: PACU  Patient participation: Able to fully participate in evaluation  Level of consciousness: awake  Pain management: adequate  Airway patency: patent  Cardiovascular status: acceptable  Respiratory status: acceptable  Hydration status: acceptable  PONV: none     Anesthetic complications: None          Last vitals:  Vitals:    03/14/19 1330 03/14/19 1400 03/14/19 1430   BP: 124/84 124/60 140/63   Pulse:      Resp: 18 25 20   Temp:      SpO2:  96%          Electronically Signed By: Janee Villarreal  March 14, 2019  3:44 PM

## 2019-03-14 NOTE — PROGRESS NOTES
Alert oriented x 3, confused and forgetful at times, I repeated information about cares several times. Lung sounds clear,showed her how to use IS. incision on left should clean dry and intact. , good cms, shoulder on sling, ice applied. Has a hemovac. Has a cross good urinary output, drinking fluids.  Dilaudid for pain is effective. On capnography Et C02 42.

## 2019-03-14 NOTE — ANESTHESIA CARE TRANSFER NOTE
Patient: Aysha Rose    Procedure(s):  LEFT REVERSE TOTAL SHOULDER  ARTHROPLASTY    Diagnosis: LEFT HUMERUS FRACTURE  Diagnosis Additional Information: No value filed.    Anesthesia Type:   General, ETT     Note:  Airway :Nasal Cannula  Patient transferred to:PACU  Comments: To PACU: [place], good airway, 02 face mask  BP down treated with Alex   Report to RNHandoff Report: Identifed the Patient, Identified the Reponsible Provider, Reviewed the pertinent medical history, Discussed the surgical course, Reviewed Intra-OP anesthesia mangement and issues during anesthesia, Set expectations for post-procedure period and Allowed opportunity for questions and acknowledgement of understanding      Vitals: (Last set prior to Anesthesia Care Transfer)    CRNA VITALS  3/14/2019 1018 - 3/14/2019 1048      3/14/2019             Resp Rate (set):  10                Electronically Signed By: MUNA Kaiser CRNA  March 14, 2019  10:48 AM

## 2019-03-14 NOTE — OP NOTE
Procedure Date: 03/14/2019      PREOPERATIVE DIAGNOSIS:  Left 4-part displaced proximal humerus fracture.      POSTOPERATIVE DIAGNOSIS:  Left 4-part displaced proximal humerus fracture.      PROCEDURES:     1.  Left reverse total shoulder arthroplasty with open reduction internal fixation, posterior greater tuberosity fragments.   2.  Left long head biceps tenodesis.        SURGEON:  Eh Dobbs MD      ASSISTANT:  Vamshi Camargo PA-C      ANESTHESIA:  General endotracheal.      ESTIMATED BLOOD LOSS:  200 mL.      FLUID REPLACEMENT:  1200 mL crystalloid.      COMPLICATIONS:  No immediate complications.      INDICATIONS:  Please see preoperative clinical notes.      COMPONENTS:   1.  Tornier Aequalis reversed humeral component, 6.5 x 100 mm stem, standard 36 mm metaphyseal component, secured in 10 degrees retroversion with full bone cementation, distal cement restrictor (vancomycin antibiotic-impregnated cement).   2.  9 x 36 mm all polyethylene humeral insert.   3.  Standard 25 mm baseplate.   4.  A 36 mm +2 posteroinferior offset glenosphere component.      SCREW CONFIGURATION:   1.  Superior locking screw, 32 mm.   2.  Inferior locking screw, 20 mm.   3.  Posterior compression screw directed slightly superior 26 mm with excellent purchase.      DESCRIPTION OF PROCEDURE:  The patient was identified in the preoperative holding area and taken to room #30 of the main OR.  Monitors were placed per the Anesthesia Service.  After smooth IV induction general anesthesia was introduced and her endotracheal tube secured.  She was given 2 grams of Ancef IV.  She was then repositioned in the modified beach chair position with head and neck secured in neutral position and all peripheral extremities thoroughly padded with foam.  Left upper extremity was then widely prepped and draped in the usual sterile fashion.  BERKLEY hose and pneumoboots were in place and operational during the procedure.      Surgical team took timeout  to confirm the correct patient, correct site marking, correct equipment and implants, correct images were available, and that she had been administered IV antibiotic therapy.  Oblique skin incision was centered over the anterior left shoulder.  Skin flaps elevated.  Cephalic vein identified, retracted laterally with the deltoid, the pectoralis and conjoined tendon swept medially in successive layers without excessive tension on the musculocutaneous nerve.  Subscapularis was identified, tenotomized and elevated off the fractured lesser tuberosity fragment which was removed.  We also debulked the posterior superior tuberosities.  The posterior tuberosities were left intact with the rotator cuff to optimize external rotation function.  Humeral head was then removed as the joint was open.  Long head biceps was tenotomized and the  intraarticular portion sharply excised.  Care was taken to protect the medial neurovascular bundle and axillary nerve at all times.  Mobilization sutures were placed in the rotator cuff, anterior and posterior.  We then visualized the glenoid vault.  A complete capsulectomy and labrectomy was performed to allow full visualization.  Low central starting point was obtained for reaming to flatten the glenoid to a cortical cancellous interface to accept the baseplate.  The opening drill then utilized.  Copious lavage performed.  The baseplate impacted to appropriate press-fit with excellent security and backed up with superior and inferior locking screws as well as posterior compression screw with excellent fixation.  After pulse lavage, we then impacted the glenosphere to appropriate depth with excellent press-fit security and backed this up with central locking screw.  We then placed trials on the humeral side to the level of the calcar.  A 6.5 mm stem had appropriate diameter and fit.  A 9 mm insert had appropriate soft tissue tension without rocking or instability and was accepted.   Fluoroscopy confirmed anatomicity of the components without complication with relatively nice reduction of the posterior tuberosity fragment.      I then removed the humeral trials.  Thorough lavage was performed to cleanse the humeral canal.  Cement restrictor placed at appropriate depth.  Epinephrine-soaked packing utilized to obtain hemostasis.  The implant assembled on the back table to maximal security.  Doughy cement was then placed in retrograde fashion slightly pressurized and the actual implant then impacted to appropriate press-fit.  Excess cement removed as it cured.  Excellent fixation was achieved.  We did place 2 drill holes prior to this and #2 FiberWire suture placed in the lateral aspect of the metadiaphyseal shaft of the humerus for tuberosity refixation.  After thorough lavage, we then impacted the appropriate polyethylene insert. I had excellent stability of the shoulder and then reduced.  It had nice soft tissue tension.  No rocking or instability was accepted.      I then utilized a modified Ramiro-Alan suture technique to close the posterior greater tuberosity fragment anatomically back to the shaft posteriorly and then the remaining rotator cuff oversewn in like fashion from medial to lateral as well as from the shaft fixation proximally.  Overall, excellent closure of the implant was noted.  There was no prominence of any tuberosities, which had been debulked previously.  The axillary nerve was palpated and found to be intact.  We did incorporate the long head biceps laterally for soft tissue tenodesis.      Final fluoroscopic spot films were obtained.  Excellent stability was noted to manual assessment of the components.  Thorough lavage was then performed.  Deltopectoral interval was closed with #2 Ethibond suture over a medium Hemovac drain, brought through clean anterolateral skin.  Skin closed in layers with 3-0 Monocryl and 3-0 Prolene running subcuticular for skin.  Steri-Strips and a  bulky sterile dressing were applied.  She was placed in an EZ-wrap device and UltraSling in neutral position, was then reversed, extubated and taken back to recovery in stable condition.  There were no immediate complications and she appeared to tolerate the procedure well.      A skilled first assistant was necessary for this procedure for assistance with patient positioning, prepping, draping, surgical visualization, performance of repair, wound closure, dressing and sling application.      PQRI MEASURES:   1.  The patient was given 2 grams of Ancef IV within 1 hour prior to skin incision.  IV antibiotic therapy was discontinued within 24 hours postoperatively.   2.  Deep venous thrombosis prophylaxis with aspirin and early mobilization x2 weeks.   3.  UltraSling x6 weeks.   4.  Standard phase 1 left reverse total shoulder arthroplasty protocol for physical therapy.   5.  She should have a true AP and outlet radiograph of the left shoulder, return to office in 10-14 days for suture removal.         RC MELGAR MD             D: 2019   T: 2019   MT: DENISE      Name:     FRANCIE GILLESPIE   MRN:      [id number]        Account:        [id number]   :      1935           Procedure Date: 2019      Document: [id number]       cc: Agustin Peguero MD

## 2019-03-15 ENCOUNTER — APPOINTMENT (OUTPATIENT)
Dept: OCCUPATIONAL THERAPY | Facility: CLINIC | Age: 84
DRG: 483 | End: 2019-03-15
Attending: ORTHOPAEDIC SURGERY
Payer: MEDICARE

## 2019-03-15 LAB
ANION GAP SERPL CALCULATED.3IONS-SCNC: 5 MMOL/L (ref 3–14)
BASOPHILS # BLD AUTO: 0 10E9/L (ref 0–0.2)
BASOPHILS NFR BLD AUTO: 0.1 %
BUN SERPL-MCNC: 12 MG/DL (ref 7–30)
CALCIUM SERPL-MCNC: 8.6 MG/DL (ref 8.5–10.1)
CHLORIDE SERPL-SCNC: 106 MMOL/L (ref 94–109)
CO2 SERPL-SCNC: 29 MMOL/L (ref 20–32)
CREAT SERPL-MCNC: 0.71 MG/DL (ref 0.52–1.04)
DIFFERENTIAL METHOD BLD: ABNORMAL
EOSINOPHIL # BLD AUTO: 0.1 10E9/L (ref 0–0.7)
EOSINOPHIL NFR BLD AUTO: 0.9 %
ERYTHROCYTE [DISTWIDTH] IN BLOOD BY AUTOMATED COUNT: 13.7 % (ref 10–15)
GFR SERPL CREATININE-BSD FRML MDRD: 79 ML/MIN/{1.73_M2}
GLUCOSE SERPL-MCNC: 95 MG/DL (ref 70–99)
HCT VFR BLD AUTO: 28.4 % (ref 35–47)
HGB BLD-MCNC: 9.5 G/DL (ref 11.7–15.7)
IMM GRANULOCYTES # BLD: 0 10E9/L (ref 0–0.4)
IMM GRANULOCYTES NFR BLD: 0.3 %
LYMPHOCYTES # BLD AUTO: 1.7 10E9/L (ref 0.8–5.3)
LYMPHOCYTES NFR BLD AUTO: 19 %
MCH RBC QN AUTO: 31.5 PG (ref 26.5–33)
MCHC RBC AUTO-ENTMCNC: 33.5 G/DL (ref 31.5–36.5)
MCV RBC AUTO: 94 FL (ref 78–100)
MONOCYTES # BLD AUTO: 0.6 10E9/L (ref 0–1.3)
MONOCYTES NFR BLD AUTO: 7 %
NEUTROPHILS # BLD AUTO: 6.4 10E9/L (ref 1.6–8.3)
NEUTROPHILS NFR BLD AUTO: 72.7 %
NRBC # BLD AUTO: 0 10*3/UL
NRBC BLD AUTO-RTO: 0 /100
PLATELET # BLD AUTO: 232 10E9/L (ref 150–450)
POTASSIUM SERPL-SCNC: 3.9 MMOL/L (ref 3.4–5.3)
RBC # BLD AUTO: 3.02 10E12/L (ref 3.8–5.2)
SODIUM SERPL-SCNC: 140 MMOL/L (ref 133–144)
WBC # BLD AUTO: 8.8 10E9/L (ref 4–11)

## 2019-03-15 PROCEDURE — 25000132 ZZH RX MED GY IP 250 OP 250 PS 637: Mod: GY | Performed by: ORTHOPAEDIC SURGERY

## 2019-03-15 PROCEDURE — 85025 COMPLETE CBC W/AUTO DIFF WBC: CPT | Performed by: PHYSICIAN ASSISTANT

## 2019-03-15 PROCEDURE — 97165 OT EVAL LOW COMPLEX 30 MIN: CPT | Mod: GO

## 2019-03-15 PROCEDURE — 25000128 H RX IP 250 OP 636: Performed by: ORTHOPAEDIC SURGERY

## 2019-03-15 PROCEDURE — 12000000 ZZH R&B MED SURG/OB

## 2019-03-15 PROCEDURE — 36415 COLL VENOUS BLD VENIPUNCTURE: CPT | Performed by: PHYSICIAN ASSISTANT

## 2019-03-15 PROCEDURE — A9270 NON-COVERED ITEM OR SERVICE: HCPCS | Mod: GY | Performed by: ORTHOPAEDIC SURGERY

## 2019-03-15 PROCEDURE — 97535 SELF CARE MNGMENT TRAINING: CPT | Mod: GO

## 2019-03-15 PROCEDURE — A9270 NON-COVERED ITEM OR SERVICE: HCPCS | Mod: GY | Performed by: PHYSICIAN ASSISTANT

## 2019-03-15 PROCEDURE — 97110 THERAPEUTIC EXERCISES: CPT | Mod: GO

## 2019-03-15 PROCEDURE — 25000132 ZZH RX MED GY IP 250 OP 250 PS 637: Mod: GY | Performed by: PHYSICIAN ASSISTANT

## 2019-03-15 PROCEDURE — 80048 BASIC METABOLIC PNL TOTAL CA: CPT | Performed by: PHYSICIAN ASSISTANT

## 2019-03-15 RX ORDER — IRBESARTAN AND HYDROCHLOROTHIAZIDE 150; 12.5 MG/1; MG/1
1 TABLET, FILM COATED ORAL DAILY
Status: DISCONTINUED | OUTPATIENT
Start: 2019-03-15 | End: 2019-03-15

## 2019-03-15 RX ORDER — HYDROCHLOROTHIAZIDE 12.5 MG/1
12.5 CAPSULE ORAL DAILY
Status: DISCONTINUED | OUTPATIENT
Start: 2019-03-15 | End: 2019-03-16 | Stop reason: HOSPADM

## 2019-03-15 RX ORDER — IRBESARTAN 150 MG/1
150 TABLET ORAL DAILY
Status: DISCONTINUED | OUTPATIENT
Start: 2019-03-15 | End: 2019-03-16 | Stop reason: HOSPADM

## 2019-03-15 RX ORDER — CALCIUM CARBONATE 500 MG/1
1000 TABLET, CHEWABLE ORAL
Status: DISCONTINUED | OUTPATIENT
Start: 2019-03-15 | End: 2019-03-16 | Stop reason: HOSPADM

## 2019-03-15 RX ADMIN — OXYCODONE HYDROCHLORIDE 5 MG: 5 TABLET ORAL at 03:55

## 2019-03-15 RX ADMIN — IRBESARTAN 150 MG: 150 TABLET ORAL at 08:16

## 2019-03-15 RX ADMIN — SENNOSIDES AND DOCUSATE SODIUM 2 TABLET: 8.6; 5 TABLET ORAL at 08:16

## 2019-03-15 RX ADMIN — HYDROCHLOROTHIAZIDE 12.5 MG: 12.5 CAPSULE ORAL at 08:16

## 2019-03-15 RX ADMIN — ACETAMINOPHEN 975 MG: 325 TABLET, FILM COATED ORAL at 12:52

## 2019-03-15 RX ADMIN — ACETAMINOPHEN 975 MG: 325 TABLET, FILM COATED ORAL at 21:48

## 2019-03-15 RX ADMIN — Medication 2 TABLET: at 21:49

## 2019-03-15 RX ADMIN — CALCIUM CARBONATE (ANTACID) CHEW TAB 500 MG 1000 MG: 500 CHEW TAB at 14:38

## 2019-03-15 RX ADMIN — OXYCODONE HYDROCHLORIDE 5 MG: 5 TABLET ORAL at 08:16

## 2019-03-15 RX ADMIN — SIMVASTATIN 20 MG: 20 TABLET, FILM COATED ORAL at 21:49

## 2019-03-15 RX ADMIN — ACETAMINOPHEN 975 MG: 325 TABLET, FILM COATED ORAL at 04:55

## 2019-03-15 RX ADMIN — CEFAZOLIN 1 G: 1 INJECTION, POWDER, FOR SOLUTION INTRAMUSCULAR; INTRAVENOUS at 02:49

## 2019-03-15 RX ADMIN — ASPIRIN 325 MG: 325 TABLET, DELAYED RELEASE ORAL at 08:16

## 2019-03-15 ASSESSMENT — ACTIVITIES OF DAILY LIVING (ADL)
ADLS_ACUITY_SCORE: 11
ADLS_ACUITY_SCORE: 13
ADLS_ACUITY_SCORE: 11
ADLS_ACUITY_SCORE: 11
ADLS_ACUITY_SCORE: 13
ADLS_ACUITY_SCORE: 13

## 2019-03-15 NOTE — PLAN OF CARE
Discharge Planner OT   Patient plan for discharge: Home with assist from spouse   Current status: Pt completed sit<>Stand transfers with SBA. While seated pt completed upper body dressing including don/doff sling with SBA and don/doff button up shirt with minimum assist. Pt reported that she will have assist for UE dressing as needed. Reviewed LUE exercises, pt verbalized and demonstrated understanding.   Barriers to return to prior living situation: pain   Recommendations for discharge: Home with assist from spouse for I/ADLs as needed. OP OT or PT for LUE strengthening per MD  Rationale for recommendations: Pt will have assist from spouse as needed for I/ADLs. OP OT or PT for LUE strengthening.       Entered by: Isaac Umana 03/15/2019 2:09 PM          Occupational Therapy Discharge Summary    Reason for therapy discharge:    All goals and outcomes met, no further needs identified.    Progress towards therapy goal(s). See goals on Care Plan in [facility] electronic health record for goal details.  Goals met    Therapy recommendation(s):    Continue home exercise program.  Home with assist from spouse for I/ADLs as needed. OP OT or PT for LUE strengthening per MD

## 2019-03-15 NOTE — PLAN OF CARE
POD2 left reverse left total shoulder, POD4 ORIF. Disoriented to time, forgetful. Soft BPs, other VSS on 2L oxygen. Mild edema in left arm, dressings CDI. Sling/brace in place, arm elevated on pillows. Pain control with Tramadol and Oxycodone PRN, scheduled Tylenol. Voiding in BR.

## 2019-03-15 NOTE — PROGRESS NOTES
Hospitalist chart check. Vital signs stable, BP in 130-140 range. Discussed with nursing. Patient is doing well. Eating/drinking well and saline locked. Cr is stable. Will resume PTA irbesartan/hctz at home dosing.     Please call with questions or concerns. Hosptialist will sign off.     Natalie Salgado PA-C

## 2019-03-15 NOTE — PROGRESS NOTES
SPIRITUAL HEALTH SERVICES Progress Note  [facility]    Visited per request.    Pt reflected on her booker community requested to see a . Pt said she knows Fr. Jacobs. Pt shared she has been impressed with hospital staff. Pt reflected on her fall and said this may be difficult for her to recover from. Pt requested prayer for a good recovery.     SH provided a kind presence, listening, acknowledged Pt's fall and journey of recovery, conversation, reflective questions, contemplative silence, prayer. SH will request Fr. Jacobs.      is available if further needs arise.       Matty Lee  Chaplain Resident

## 2019-03-15 NOTE — PROGRESS NOTES
ORTHO  PAIN WELL CONTROLLED  AFVSS  L UE:  DRESSING CDI.  CMS INTACT.  SLING IN PLACE.  HVC PATENT.  HB PENDING  XRAY GOOD ALIGNMENT.  POD #1  IV ABX  ECASA  PT/OT  DISCHARGE PLANNING

## 2019-03-15 NOTE — PLAN OF CARE
Discharge Planner PT   Patient plan for discharge: home with spouse per chart  Current status: PT: Order received; no IP PT needs per chart review and conversation with OT; Patient independent with mobility and will have assist from spouse as needed; Will complete order.  Barriers to return to prior living situation: see OT note  Recommendations for discharge: defer to OT/medical team  Rationale for recommendations: defer to OT/medical team       Entered by: Lady Gonzalez 03/15/2019 11:51 AM

## 2019-03-15 NOTE — PLAN OF CARE
POD1 left reverse total shoulder for humerus fracture after fall. A/Ox4. VSS on room air. L arm in sling, dressing CDI. Zeng out at 0600. S/L overnight, encouraging oral intake. Intermittent IV abx. BS active, passing gas, diet fully advanced to regular. Hemovac to accordion suction. Pain controlled with 5mg Oxycodone Q4H and scheduled Tylenol. Melatonin given at bedtime. OT, PT, SW consults in. Continue to monitor

## 2019-03-15 NOTE — PLAN OF CARE
Pt A&Ox4, VSS, ambulating well with SBA with GB. Hemovac patent, denies numbness or tingling. Dressing CDI. Voiding appropriately in BR. Tolerating reg diet. IV-SL. Continue to monitor.

## 2019-03-15 NOTE — PROGRESS NOTES
" 03/15/19 0816   Quick Adds   Type of Visit Initial Occupational Therapy Evaluation   Living Environment   Lives With spouse   Living Arrangements ([facility])   Home Accessibility stairs to enter home;stairs within home   Number of Stairs, Main Entrance 1   Number of Stairs, Within Home, Primary (approx 2 sets of 6 and one set of 12, 3 floor townhome)   Stair Railings, Within Home, Primary railing on right side (ascending);railings safe and in good condition   Transportation Anticipated family or friend will provide;car, drives self   Self-Care   Usual Activity Tolerance good   Current Activity Tolerance moderate   Regular Exercise Yes   Activity/Exercise Type walking;strength training   Exercise Amount/Frequency 3-5 times/wk  (2-3mi/day 6d/wk, does 12# UE ex's)   Equipment Currently Used at Home grab bar, tub/shower   Functional Level   Ambulation 0-->independent   Transferring 0-->independent   Toileting 0-->independent   Bathing 0-->independent   Dressing 0-->independent   Eating 0-->independent   Communication 0-->understands/communicates without difficulty   Swallowing 0-->swallows foods/liquids without difficulty   Cognition 0 - no cognition issues reported   Fall history within last six months yes   Number of times patient has fallen within last six months 1   Which of the above functional risks had a recent onset or change? none   Prior Functional Level Comment I all IADL, she does all household tasks, meds, driving, she reports spouse \"not that helpful\"   General Information   Onset of Illness/Injury or Date of Surgery - Date 03/14/19   Referring Physician Dr. Eh Dobbs   Patient/Family Goals Statement return home   Additional Occupational Profile Info/Pertinent History of Current Problem Left reverse total shoulder arthroplasty with open reduction internal fixation, posterior greater tuberosity fragments, and L bicep tenodesis   Precautions/Limitations fall precautions  (Elbow/wrist ROM only, NO " isometrics, NO klaus ROM )   Weight-Bearing Status - LUE nonweight-bearing   Cognitive Status Examination   Orientation orientation to person, place and time   Level of Consciousness alert   Follows Commands (Cognition) WNL   Visual Perception   Visual Perception No deficits were identified;Wears glasses   Pain Assessment   Patient Currently in Pain Yes, see Vital Sign flowsheet   Range of Motion (ROM)   ROM Comment RUE WN:   Strength   Strength Comments RUE 5/5   Coordination   Upper Extremity Coordination No deficits were identified   Transfer Skill: Bed to Chair/Chair to Bed   Level of [place]: Bed to Chair stand-by assist   Physical Assist/Nonphysical Assist: Bed to Chair supervision   Transfer Skill: Sit to Stand   Level of [place]: Sit/Stand stand-by assist   Physical Assist/Nonphysical Assist: Sit/Stand supervision   Transfer Skill: Toilet Transfer   Level of [place]: Toilet stand-by assist   Physical Assist/Nonphysical Assist: Toilet supervision   Balance   Balance Comments No deficits   Upper Body Dressing   Level of [place]: Dress Upper Body moderate assist (50% patients effort)   Physical Assist/Nonphysical Assist: Dress Upper Body 1 person assist   Lower Body Dressing   Level of [place]: Dress Lower Body minimum assist (75% patients effort)   Physical Assist/Nonphysical Assist: Dress Lower Body 1 person assist   Toileting   Level of [place]: Toilet independent   Eating/Self Feeding   Level of [place]: Eating independent   Activities of Daily Living Analysis   Impairments Contributing to Impaired Activities of Daily Living pain;post surgical precautions;ROM decreased;strength decreased  (L klaus)   General Therapy Interventions   Planned Therapy Interventions ADL retraining;transfer training;home program guidelines   Clinical Impression   Criteria for Skilled Therapeutic Interventions Met yes, treatment indicated   OT Diagnosis decreased ADL performance   Influenced by the  "following impairments post-op L klaus pain, decreased ROM, strength   Assessment of Occupational Performance 1-3 Performance Deficits   Identified Performance Deficits dressing, bathing, household mgmt   Clinical Decision Making (Complexity) Low complexity   Therapy Frequency 2 times/day   Predicted Duration of Therapy Intervention (days/wks) 2 days   Anticipated Discharge Disposition Home with Assist   Risks and Benefits of Treatment have been explained. Yes   Patient, Family & other staff in agreement with plan of care Yes   [facility] TM \"6 Clicks\"   2016, Trustees of [facility], under license to [employer].  All rights reserved.   6 Clicks Short Forms Daily Activity Inpatient Short Form   [facility]  \"6 Clicks\" Daily Activity Inpatient Short Form   1. Putting on and taking off regular lower body clothing? 3 - A Little   2. Bathing (including washing, rinsing, drying)? 3 - A Little   3. Toileting, which includes using toilet, bedpan or urinal? 4 - None   4. Putting on and taking off regular upper body clothing? 2 - A Lot   5. Taking care of personal grooming such as brushing teeth? 4 - None   6. Eating meals? 4 - None   Daily Activity Raw Score (Score out of 24.Lower scores equate to lower levels of function) 20   Total Evaluation Time   Total Evaluation Time (Minutes) 15     "

## 2019-03-15 NOTE — PROGRESS NOTES
SW consult for discharge planning acknowledged.    D: Per OT, pt can go home with spouse. No SW needs at this time.    P: SW will follow for additional needs.    PALMER Slade

## 2019-03-15 NOTE — PLAN OF CARE
Discharge Planner OT   Patient plan for discharge: home w/spouse   Current status: OT eval completed and treatment initiated on 84yo female POD#1 L reverse TSA with ORIF posterior greater tuberosity fragments, L bicep tenodesis. Dr. Dobbs's HEP specified elbow and wrist ROM only. Pt doing well, up in room SBA. Initiated instruction in modified dressing, sling mgmt and HEP. OT recommended pt have spouse come in for training as well however patient stated she would prefer to not ask him for assist and be able to do everything on her own. Will continue training - OT to see twice daily per POC.   Barriers to return to prior living situation: none noted except appears spouse only willing to provide very minimal assist  Recommendations for discharge: home  Rationale for recommendations: pt does remarkedly well, anticipate progress to goals       Entered by: Pavel Goodwin 03/15/2019 10:13 AM

## 2019-03-16 VITALS
OXYGEN SATURATION: 95 % | RESPIRATION RATE: 18 BRPM | TEMPERATURE: 98 F | HEART RATE: 86 BPM | DIASTOLIC BLOOD PRESSURE: 75 MMHG | WEIGHT: 141 LBS | HEIGHT: 64 IN | BODY MASS INDEX: 24.07 KG/M2 | SYSTOLIC BLOOD PRESSURE: 148 MMHG

## 2019-03-16 LAB
GLUCOSE SERPL-MCNC: 92 MG/DL (ref 70–99)
HGB BLD-MCNC: 9.8 G/DL (ref 11.7–15.7)

## 2019-03-16 PROCEDURE — A9270 NON-COVERED ITEM OR SERVICE: HCPCS | Mod: GY | Performed by: ORTHOPAEDIC SURGERY

## 2019-03-16 PROCEDURE — 25000132 ZZH RX MED GY IP 250 OP 250 PS 637: Mod: GY | Performed by: ORTHOPAEDIC SURGERY

## 2019-03-16 PROCEDURE — 25000132 ZZH RX MED GY IP 250 OP 250 PS 637: Mod: GY | Performed by: PHYSICIAN ASSISTANT

## 2019-03-16 PROCEDURE — A9270 NON-COVERED ITEM OR SERVICE: HCPCS | Mod: GY | Performed by: PHYSICIAN ASSISTANT

## 2019-03-16 PROCEDURE — 85018 HEMOGLOBIN: CPT | Performed by: ORTHOPAEDIC SURGERY

## 2019-03-16 PROCEDURE — 82947 ASSAY GLUCOSE BLOOD QUANT: CPT | Performed by: ORTHOPAEDIC SURGERY

## 2019-03-16 PROCEDURE — 36415 COLL VENOUS BLD VENIPUNCTURE: CPT | Performed by: ORTHOPAEDIC SURGERY

## 2019-03-16 RX ADMIN — HYDROCHLOROTHIAZIDE 12.5 MG: 12.5 CAPSULE ORAL at 09:02

## 2019-03-16 RX ADMIN — ASPIRIN 325 MG: 325 TABLET, DELAYED RELEASE ORAL at 09:02

## 2019-03-16 RX ADMIN — ACETAMINOPHEN 975 MG: 325 TABLET, FILM COATED ORAL at 05:29

## 2019-03-16 RX ADMIN — IRBESARTAN 150 MG: 150 TABLET ORAL at 09:01

## 2019-03-16 ASSESSMENT — ACTIVITIES OF DAILY LIVING (ADL)
ADLS_ACUITY_SCORE: 11

## 2019-03-16 NOTE — DISCHARGE SUMMARY
Admit Date:     2019   Discharge Date:     2019      ADMITTING DIAGNOSIS:  Left 4-part proximal humerus fracture.      PROCEDURES PERFORMED:  Left reverse total shoulder arthroplasty.      HOSPITAL COURSE:  Ms. Gillespie was admitted to the inpatient [facility] on 55 following uneventful surgery.  Her postoperative course was relatively smooth.  On postoperative day #1, she was tolerating a general diet and oral pain medication.  She underwent physical therapy for elbow and wrist range of motion only.  She was discharged home in stable condition on postoperative day #2.      DISCHARGE MEDICATIONS:  Norco (she already has a prescription) as well as over-the-counter stool softeners.  She will do mobilization only for DVT prophylaxis given her intolerance of aspirin.      She is to keep the bandage clean and dry and sponge bathe.  UltraSling x6 weeks for protection.  Standard phase 1 reverse total shoulder arthroplasty protocol for physical therapy.  I will plan on seeing her back in the office in 1 week for suture removal, at which time she should have a true AP and outlet radiograph of the left shoulder.         EH MELGAR MD             D: 2019   T: 2019   MT: DEL      Name:     FRANCIE GILLESPIE   MRN:      [id number]        Account:        [id number]   :      1935           Admit Date:     2019                                  Discharge Date: 2019      Document: [id number]       cc: Eh Peguero MD

## 2019-03-16 NOTE — PLAN OF CARE
Pt A/O x4. CMS intact; Dressing CDI and sling in place. VSS on RA. Up SBA to the BR. Taking only scheduled Tylenol for pain. Loss of IV access. Hemovac patent. Ready for discharge to home today.

## 2019-03-16 NOTE — PLAN OF CARE
Patient up with SBA. Adequate I/O. Pain managed with tylenol. Discharge AVS reviewed with patient and , Dr. Montes instruction sheet given, no questions at this time. Patient discharged home with all belongings via family.

## 2019-03-16 NOTE — DISCHARGE INSTRUCTIONS
SEE [facility] INPATIENT DISCHARGE INSTRUCTION SHEET.  PATIENT DOES NOT NEED PT REFERRAL AT THIS TIME.

## 2019-03-16 NOTE — PROGRESS NOTES
ORTHO  PAIN WELL CONTROLLED  AFVSS  L UE:  DRESSING CDI.  CMS INTACT.  SLING IN PLACE.  HVC PATENT.  HB 9.8  POD #2  ECASA  PT/OT  DISCHARGE PLANNING

## 2019-03-18 ENCOUNTER — TELEPHONE (OUTPATIENT)
Dept: FAMILY MEDICINE | Facility: CLINIC | Age: 84
End: 2019-03-18

## 2019-03-18 NOTE — TELEPHONE ENCOUNTER
ED / Discharge Outreach Protocol    Patient Contact    Attempt # 1    Was call answered?  No.  Left message on voicemail with information to call me back.    JOE WashingtonN, RN  [employer] Triage

## 2019-03-18 NOTE — TELEPHONE ENCOUNTER
Chief Complaint: Closed Fracture Of Proximal End Of Left Humerus With Nonunion, Unspecified Fracture Morphology, Subsequent Encou,SAT 16-MAR-2019,ed/ip  1 / 1    [phone] (home)

## 2019-03-20 NOTE — TELEPHONE ENCOUNTER
ED / Discharge Outreach Protocol    Patient Contact    Attempt # 2    Was call answered?  No.  Left message on voicemail with information to call SHANNA STARKEY RN

## 2019-03-22 ENCOUNTER — TRANSFERRED RECORDS (OUTPATIENT)
Dept: HEALTH INFORMATION MANAGEMENT | Facility: CLINIC | Age: 84
End: 2019-03-22

## 2019-04-26 ENCOUNTER — TRANSFERRED RECORDS (OUTPATIENT)
Dept: HEALTH INFORMATION MANAGEMENT | Facility: CLINIC | Age: 84
End: 2019-04-26

## 2019-06-07 ENCOUNTER — TRANSFERRED RECORDS (OUTPATIENT)
Dept: HEALTH INFORMATION MANAGEMENT | Facility: CLINIC | Age: 84
End: 2019-06-07

## 2019-06-12 ENCOUNTER — DOCUMENTATION ONLY (OUTPATIENT)
Dept: FAMILY MEDICINE | Facility: CLINIC | Age: 84
End: 2019-06-12

## 2019-06-12 DIAGNOSIS — E78.5 HYPERLIPIDEMIA LDL GOAL <130: ICD-10-CM

## 2019-06-12 DIAGNOSIS — D50.9 IRON DEFICIENCY ANEMIA, UNSPECIFIED IRON DEFICIENCY ANEMIA TYPE: Primary | ICD-10-CM

## 2019-06-12 NOTE — PROGRESS NOTES
There are no orders for this patient for the upcoming lab visit. Please order labs as needed.     Reason for visit: Annual Physical/Wellness Appointment.    Thank you, lab.

## 2019-06-26 DIAGNOSIS — E78.5 HYPERLIPIDEMIA LDL GOAL <130: ICD-10-CM

## 2019-06-26 DIAGNOSIS — D50.9 IRON DEFICIENCY ANEMIA, UNSPECIFIED IRON DEFICIENCY ANEMIA TYPE: ICD-10-CM

## 2019-06-26 LAB
CHOLEST SERPL-MCNC: 190 MG/DL
ERYTHROCYTE [DISTWIDTH] IN BLOOD BY AUTOMATED COUNT: 12.9 % (ref 10–15)
HCT VFR BLD AUTO: 40.5 % (ref 35–47)
HDLC SERPL-MCNC: 65 MG/DL
HGB BLD-MCNC: 13.4 G/DL (ref 11.7–15.7)
LDLC SERPL CALC-MCNC: 112 MG/DL
MCH RBC QN AUTO: 31.5 PG (ref 26.5–33)
MCHC RBC AUTO-ENTMCNC: 33.1 G/DL (ref 31.5–36.5)
MCV RBC AUTO: 95 FL (ref 78–100)
NONHDLC SERPL-MCNC: 125 MG/DL
PLATELET # BLD AUTO: 264 10E9/L (ref 150–450)
RBC # BLD AUTO: 4.26 10E12/L (ref 3.8–5.2)
TRIGL SERPL-MCNC: 65 MG/DL
WBC # BLD AUTO: 5.3 10E9/L (ref 4–11)

## 2019-06-26 PROCEDURE — 85027 COMPLETE CBC AUTOMATED: CPT | Performed by: INTERNAL MEDICINE

## 2019-06-26 PROCEDURE — 36415 COLL VENOUS BLD VENIPUNCTURE: CPT | Performed by: INTERNAL MEDICINE

## 2019-06-26 PROCEDURE — 80061 LIPID PANEL: CPT | Performed by: INTERNAL MEDICINE

## 2019-07-01 DIAGNOSIS — I10 ESSENTIAL HYPERTENSION WITH GOAL BLOOD PRESSURE LESS THAN 140/90: ICD-10-CM

## 2019-07-01 NOTE — TELEPHONE ENCOUNTER
"irbesartan-hydrochlorothiazide (AVALIDE) 150-12.5 MG tablet    Last Written Prescription Date:  06/26/2018  Last Fill Quantity: 90,  # refills: 3   Last office visit: 3/11/2019 with prescribing provider:  Lui   Future Office Visit:   Next 5 appointments (look out 90 days)    Jul 05, 2019  8:30 AM CDT  [facility] [address] [facility] [address] [phone]           Requested Prescriptions   Pending Prescriptions Disp Refills     irbesartan-hydrochlorothiazide (AVALIDE) 150-12.5 MG tablet [Pharmacy Med Name: IRBESARTAN/HCTZ 150-12.5MG TABLETS] 90 tablet 0     Sig: TAKE 1 TABLET BY MOUTH DAILY       Angiotensin-II Receptors Failed - 7/1/2019 11:19 AM        Failed - Blood pressure under 140/90 in past 12 months     BP Readings from Last 3 Encounters:   03/16/19 148/75   03/11/19 140/80   03/05/19 150/81                 Passed - Recent (12 mo) or future (30 days) visit within the authorizing provider's specialty     Patient had office visit in the last 12 months or has a visit in the next 30 days with authorizing provider or within the authorizing provider's specialty.  See \"Patient Info\" tab in inbasket, or \"Choose Columns\" in Meds & Orders section of the refill encounter.              Passed - Medication is active on med list        Passed - Patient is age 18 or older        Passed - No active pregnancy on record        Passed - Normal serum creatinine on file in past 12 months     Recent Labs   Lab Test 03/15/19  0717  09/27/13  1612   CR 0.71   < >  --    CREAT  --   --  0.9    < > = values in this interval not displayed.             Passed - Normal serum potassium on file in past 12 months     Recent Labs   Lab Test 03/15/19  0717   POTASSIUM 3.9                    Passed - No positive pregnancy test in past 12 months       o  "

## 2019-07-02 RX ORDER — IRBESARTAN AND HYDROCHLOROTHIAZIDE 150; 12.5 MG/1; MG/1
1 TABLET, FILM COATED ORAL DAILY
Qty: 90 TABLET | Refills: 3 | Status: SHIPPED | OUTPATIENT
Start: 2019-07-02 | End: 2019-07-05

## 2019-07-02 NOTE — TELEPHONE ENCOUNTER
Routing refill request to provider for review/approval because:  Bps out of range.  Scheduled with PCP 7/5/19.  Please authorize if appropriate.  Thanks,  Xiomara Guerrero RN

## 2019-07-05 ENCOUNTER — OFFICE VISIT (OUTPATIENT)
Dept: FAMILY MEDICINE | Facility: CLINIC | Age: 84
End: 2019-07-05
Payer: MEDICARE

## 2019-07-05 VITALS
WEIGHT: 142 LBS | TEMPERATURE: 97.3 F | OXYGEN SATURATION: 97 % | BODY MASS INDEX: 24.24 KG/M2 | SYSTOLIC BLOOD PRESSURE: 139 MMHG | DIASTOLIC BLOOD PRESSURE: 85 MMHG | HEIGHT: 64 IN | HEART RATE: 85 BPM

## 2019-07-05 DIAGNOSIS — I10 ESSENTIAL HYPERTENSION WITH GOAL BLOOD PRESSURE LESS THAN 140/90: ICD-10-CM

## 2019-07-05 DIAGNOSIS — C50.919 MALIGNANT NEOPLASM OF FEMALE BREAST, UNSPECIFIED ESTROGEN RECEPTOR STATUS, UNSPECIFIED LATERALITY, UNSPECIFIED SITE OF BREAST (H): ICD-10-CM

## 2019-07-05 DIAGNOSIS — M81.0 AGE-RELATED OSTEOPOROSIS WITHOUT CURRENT PATHOLOGICAL FRACTURE: ICD-10-CM

## 2019-07-05 DIAGNOSIS — Z00.00 ROUTINE GENERAL MEDICAL EXAMINATION AT A HEALTH CARE FACILITY: Primary | ICD-10-CM

## 2019-07-05 DIAGNOSIS — M85.88 OSTEOPENIA OF LUMBAR SPINE: ICD-10-CM

## 2019-07-05 DIAGNOSIS — D50.9 IRON DEFICIENCY ANEMIA, UNSPECIFIED IRON DEFICIENCY ANEMIA TYPE: ICD-10-CM

## 2019-07-05 DIAGNOSIS — E78.5 HYPERLIPIDEMIA LDL GOAL <130: ICD-10-CM

## 2019-07-05 DIAGNOSIS — I10 BENIGN ESSENTIAL HYPERTENSION: ICD-10-CM

## 2019-07-05 DIAGNOSIS — E78.00 HIGH CHOLESTEROL: ICD-10-CM

## 2019-07-05 PROBLEM — S42.292A CLOSED 4-PART FRACTURE OF PROXIMAL HUMERUS, LEFT, INITIAL ENCOUNTER: Status: RESOLVED | Noted: 2019-03-14 | Resolved: 2019-07-05

## 2019-07-05 PROCEDURE — G0439 PPPS, SUBSEQ VISIT: HCPCS | Performed by: INTERNAL MEDICINE

## 2019-07-05 RX ORDER — SIMVASTATIN 20 MG
20 TABLET ORAL EVERY EVENING
Qty: 90 TABLET | Refills: 3 | Status: SHIPPED | OUTPATIENT
Start: 2019-07-05 | End: 2020-06-03

## 2019-07-05 RX ORDER — CEPHALEXIN 500 MG/1
CAPSULE ORAL
Refills: 2 | COMMUNITY
Start: 2019-06-07

## 2019-07-05 RX ORDER — IRBESARTAN AND HYDROCHLOROTHIAZIDE 150; 12.5 MG/1; MG/1
1 TABLET, FILM COATED ORAL DAILY
Qty: 90 TABLET | Refills: 3 | Status: SHIPPED | OUTPATIENT
Start: 2019-07-05 | End: 2020-06-03

## 2019-07-05 ASSESSMENT — MIFFLIN-ST. JEOR: SCORE: 1084.11

## 2019-07-05 ASSESSMENT — ACTIVITIES OF DAILY LIVING (ADL): CURRENT_FUNCTION: NO ASSISTANCE NEEDED

## 2019-07-05 NOTE — PROGRESS NOTES
SUBJECTIVE:   Aysha Rose is a 83 year old female who presents for Preventive Visit.    The patient is doing quite well but as noted had a low impact fracture of her left humerus last year.  She otherwise has no complaints.  Her blood pressures been quite good.  She goes to mail yearly for an exam and mammogram.               Past Medical History:      Past Medical History:   Diagnosis Date     Abdominal pain 9/13    ct abd and pelvis neg     Benign essential hypertension     nl mr renogram     Breast cancer (H) 2007    lumpectomy and xrt Thermal, got 5 years of arimidex     Erosive gastritis Oct 2011    by egd, colon as above, also small ulcer     High cholesterol      Hx of colonoscopy 2007, 2011    nl, 2011 with cecal angioect and 2mm polyp     FRANKLIN (iron deficiency anaemia) 2011, 2016 2011 colonoscopy cecal angioectasia, egd with hh, small ulcer     Microscopic hematuria 2012    Dr. Alonzo, ct done 11/28/12 negative, cysto neg     Osteopenia 2008    Thermal, fu 2014 Paula and worse -1.5 left hip; fu here 7/18 a bit worse     Osteoporosis 2019    based on low impact fx of left shoulder     Syncope 2011    neg est echo             Past Surgical History:      Past Surgical History:   Procedure Laterality Date     CATARACT IOL, RT/LT       LUMPECTOMY BREAST  2007     XR SHOULDER SURGERY WENDY LEFT Left 03/2019    tco             Social History:     Social History     Socioeconomic History     Marital status:      Spouse name: Not on file     Number of children: 4     Years of education: Not on file     Highest education level: Not on file   Occupational History     Occupation: homemaker   Social Needs     Financial resource strain: Not on file     Food insecurity:     Worry: Not on file     Inability: Not on file     Transportation needs:     Medical: Not on file     Non-medical: Not on file   Tobacco Use     Smoking status: Never Smoker     Smokeless tobacco: Never Used   Substance and Sexual Activity      Alcohol use: No     Drug use: No     Sexual activity: Yes     Partners: Male   Lifestyle     Physical activity:     Days per week: Not on file     Minutes per session: Not on file     Stress: Not on file   Relationships     Social connections:     Talks on phone: Not on file     Gets together: Not on file     Attends Zoroastrian service: Not on file     Active member of club or organization: Not on file     Attends meetings of clubs or organizations: Not on file     Relationship status: Not on file     Intimate partner violence:     Fear of current or ex partner: Not on file     Emotionally abused: Not on file     Physically abused: Not on file     Forced sexual activity: Not on file   Other Topics Concern     Parent/sibling w/ CABG, MI or angioplasty before 65F 55M? Not Asked   Social History Narrative     Not on file             Family History:   reviewed         Allergies:     Allergies   Allergen Reactions     Penicillins              Medications:     Current Outpatient Medications   Medication Sig Dispense Refill     acetaminophen (TYLENOL) 500 MG tablet Take 1,000 mg by mouth 4 times daily as needed for mild pain       calcium-vitamin D (CALCIUM 600 + D) 600-400 MG-UNIT per tablet Take 2 tablets by mouth every evening        cephALEXin (KEFLEX) 500 MG capsule TK 4 CS PO 30 TO 60 MINUTES PRIOR TO DENTAL WORK  2     ferrous sulfate ER (SLO-FE) 142 (45 Fe) MG CR tablet Take 142 mg by mouth every other day       irbesartan-hydrochlorothiazide (AVALIDE) 150-12.5 MG tablet Take 1 tablet by mouth daily 90 tablet 3     polyethylene glycol (MIRALAX/GLYCOLAX) powder Take 1 capful by mouth daily as needed (While taking Norco)       Psyllium (METAMUCIL FREE & NATURAL PO) Take 3 Tablespoonful by mouth every evening       simvastatin (ZOCOR) 20 MG tablet Take 1 tablet (20 mg) by mouth every evening TAKE 1 TABLET BY MOUTH EVERY DAY IN THE EVENING 90 tablet 3     vitamin C (ASCORBIC ACID) 500 MG tablet Take 500 mg by mouth  "every other day                 Review of Systems:   The 10 point Review of Systems is negative other than noted in the HPI           Physical Exam:   Blood pressure 139/85, pulse 85, temperature 97.3  F (36.3  C), temperature source Tympanic, height 1.626 m (5' 4\"), weight 64.4 kg (142 lb), SpO2 97 %, not currently breastfeeding.    Exam:  Constitutional: healthy appearing, alert and in no distress  Heent: Normocephalic. Head without obvious masses or lesions. PERRLDC, EOMI. Mouth exam within normal limits: tongue, mucous membranes, posterior pharynx all normal, no lesions or abnormalities seen.  Tm's and canals within normal limits bilaterally. Neck supple, no nuchal rigidity or masses. No supraclavicular, or cervical adenopathy. Thyroid symmetric, no masses.  Cardiovascular: Regular rate and rhythm, no murmer, rub or gallops.  JVP not elevated, no edema.  Carotids within normal limits bilaterally, no bruits.  Respiratory: Normal respiratory effort.  Lungs clear, normal flow, no wheezing or crackles.  Gastrointestinal: Normal active bowel sounds.   Soft, not tender, no masses, guarding or rebound.  No hepatosplenomegaly.   Musculoskeletal: extremities normal, no gross deformities noted.  Skin: no suspicious lesions or rashes   Neurologic: Mental status within normal limits.  Speech fluent.  No gross motor abnormalities and gait intact.  Psychiatric: mentation appears normal and affect normal.         Data:   Labs reviewed with patient         Assessment:   1. Normal complete physical exam  2. osteopor based on low impact fx, I discussed with patient adding treatment and she prefers prolia, will start the process, if not then fosamax weekly  3. Breast ca, les  4. Hypertension, controlled  5. Elevated cholesterol on statin  6. Consuelo, no issues  7. hcm         Plan:   prolia  Exercise, diet  Calcium and vit d        Agustin Peguero M.D.                Are you in the first 12 months of your Medicare coverage?      Healthy " "Habits:    In general, how would you rate your overall health?  Very good    Frequency of exercise:  6-7 days/week    Duration of exercise:  45-60 minutes    Do you usually eat at least 4 servings of fruit and vegetables a day, include whole grains    & fiber and avoid regularly eating high fat or \"junk\" foods?  No    Taking medications regularly:  Yes    Barriers to taking medications:  None    Medication side effects:  None    Ability to successfully perform activities of daily living:  No assistance needed    Home Safety:  No safety concerns identified    Hearing Impairment:  No hearing concerns    In the past 6 months, have you been bothered by leaking of urine? Yes    In general, how would you rate your overall mental or emotional health?  Very good      PHQ-2 Total Score:    Additional concerns today:  No    Do you feel safe in your environment? Yes    Do you have a Health Care Directive? Yes: Advance Directive has been received and scanned.      Fall risk  Fallen 2 or more times in the past year?: No  Any fall with injury in the past year?: Yes  Timed Up and Go Test (>13.5 is fall risk; contact physician) : 10    Cognitive Screening   1) Repeat 3 items (Leader, Season, Table)    2) Clock draw: NORMAL  3) 3 item recall: Recalls 3 objects  Results: 3 items recalled: COGNITIVE IMPAIRMENT LESS LIKELY    Mini-CogTM Copyright S [employer]. Licensed by the author for use in [facility]; reprinted with permission (horace@.[facility]). All rights reserved.      Do you have sleep apnea, excessive snoring or daytime drowsiness?: no    Reviewed and updated as needed this visit by clinical staff         Reviewed and updated as needed this visit by Provider        Social History     Tobacco Use     Smoking status: Never Smoker     Smokeless tobacco: Never Used   Substance Use Topics     Alcohol use: No     If you drink alcohol do you typically have >3 drinks per day or >7 drinks per week? No    Alcohol Use 6/20/2017 " "  Prescreen: >3 drinks/day or >7 drinks/week? The patient does not drink >3 drinks per day nor >7 drinks per week.               Current providers sharing in care for this patient include:   Patient Care Team:  Agustin Peguero MD as PCP - General (Internal Medicine)  Agustin Peguero MD as Assigned PCP    The following health maintenance items are reviewed in Epic and correct as of today:  Health Maintenance   Topic Date Due     ADVANCE CARE PLANNING  06/20/2019     MEDICARE ANNUAL WELLNESS VISIT  06/26/2019     INFLUENZA VACCINE (1) 09/01/2019     FALL RISK ASSESSMENT  03/11/2020     DTAP/TDAP/TD IMMUNIZATION (5 - Td) 09/24/2022     DEXA  Completed     PHQ-2  Completed     ZOSTER IMMUNIZATION  Completed     IPV IMMUNIZATION  Aged Out     MENINGITIS IMMUNIZATION  Aged Out           Review of Systems      OBJECTIVE:   There were no vitals taken for this visit. Estimated body mass index is 24.2 kg/m  as calculated from the following:    Height as of 3/14/19: 1.626 m (5' 4\").    Weight as of 3/14/19: 64 kg (141 lb).  Physical Exam          ASSESSMENT / PLAN:       End of Life Planning:  Patient currently has an advanced directive: yes    COUNSELING:  Reviewed preventive health counseling, as reflected in patient instructions       Regular exercise       Healthy diet/nutrition    Estimated body mass index is 24.2 kg/m  as calculated from the following:    Height as of 3/14/19: 1.626 m (5' 4\").    Weight as of 3/14/19: 64 kg (141 lb).         reports that she has never smoked. She has never used smokeless tobacco.      Appropriate preventive services were discussed with this patient, including applicable screening as appropriate for cardiovascular disease, diabetes, osteopenia/osteoporosis, and glaucoma.  As appropriate for age/gender, discussed screening for colorectal cancer, prostate cancer, breast cancer, and cervical cancer. Checklist reviewing preventive services available has been given to the " patient.    Reviewed patients plan of care and provided an AVS. The Basic Care Plan (routine screening as documented in Health Maintenance) for Aysha meets the Care Plan requirement. This Care Plan has been established and reviewed with the Patient.    Counseling Resources:  ATP IV Guidelines  Pooled Cohorts Equation Calculator  Breast Cancer Risk Calculator  FRAX Risk Assessment  ICSI Preventive Guidelines  Dietary Guidelines for Americans, 2010  Tutor's MyPlate  ASA Prophylaxis  Lung CA Screening    Agustin Peguero MD  [facility]    Identified Health Risks:

## 2019-07-15 ENCOUNTER — TELEPHONE (OUTPATIENT)
Dept: FAMILY MEDICINE | Facility: CLINIC | Age: 84
End: 2019-07-15

## 2019-07-15 DIAGNOSIS — M81.0 OSTEOPOROSIS: Primary | ICD-10-CM

## 2019-07-15 NOTE — TELEPHONE ENCOUNTER
"Noted, signed \"written order\" no print out to add prolia to med list    Maria Isabel TAYLOR RN    "

## 2019-07-15 NOTE — TELEPHONE ENCOUNTER
Spoke with patient     States she would prefer to take Prolia over Fosamax     States Dr. Peguero discussed this at last OV     TO PCP:     1) Order pended to add to med list   2) MAR shows Prolia was added with end date after 1 dose, do you want pt to have every 6 months?     Also forwarding to CS TRIAGE to run CALISTA TAYLOR RN

## 2019-07-15 NOTE — TELEPHONE ENCOUNTER
[employer] verification started for Prolia injection.    Fabian Vences, GERARD on 7/15/2019 at 3:47 PM

## 2019-07-15 NOTE — TELEPHONE ENCOUNTER
Reason for Call:  Other call back    Detailed comments: Patient is following up for 2nd time regarding coverage for Prolia    Please call her back    Phone Number Patient can be reached at: Home number on file [phone] (home)    Best Time: anytime    Can we leave a detailed message on this number? YES    Call taken on 7/15/2019 at 3:00 PM by Amie Drake

## 2019-07-16 NOTE — TELEPHONE ENCOUNTER
[employer] verification complete. Patient has 100% coverage for Prolia injection and can be scheduled whenever.    Fabian Vences CMA on 7/16/2019 at 9:29 AM

## 2019-07-24 ENCOUNTER — ALLIED HEALTH/NURSE VISIT (OUTPATIENT)
Dept: NURSING | Facility: CLINIC | Age: 84
End: 2019-07-24
Payer: MEDICARE

## 2019-07-24 DIAGNOSIS — M81.0 AGE-RELATED OSTEOPOROSIS WITHOUT CURRENT PATHOLOGICAL FRACTURE: Primary | ICD-10-CM

## 2019-07-24 PROCEDURE — 96372 THER/PROPH/DIAG INJ SC/IM: CPT

## 2019-07-24 PROCEDURE — 99207 ZZC NO CHARGE NURSE ONLY: CPT

## 2019-07-24 NOTE — PROGRESS NOTES
Clinic Administered Medication Documentation      Prolia Documentation    Prior to injection, verified patient identity using patient's name and date of birth. Medication was administered. Please see MAR and medication order for additional information. Patient instructed to remain in clinic for 15 minutes.    Indication: Prolia  (denosumab) is a prescription medicine used to treat osteoporosis in patients who:     Are at high risk for fracture, meaning patients who have had a fracture related to osteoporosis, or who have multiple risk factors for fracture.    Cannot use another osteoporosis medicine or other osteoporosis medicines did not work well.    The timeline for early/late injections would be 4 weeks early and any time after the 6 month maria dolores. If a patient receives their injection late, then the subsequent injection would be 6 months from the date that they actually received the injection.    1.  When was the last injection?  New  2.  Did they check with their insurance for this calendar year?  y  3.  Is there an order in the chart?  y  4.  Has the patient had dental work involving the bone in the past month or will have work in the next 6 months?  n    The following steps were completed to comply with the REMS program for Prolia:    Reviewed information in the Medication Guide and Patient Counseling Chart, including the serious risks of Prolia  and the symptoms of each risk.    Advised patient to seek prompt medical attention if they have signs or symptoms of any of the serious risks.    Provided each patient a copy of the Medication Guide and Patient Brochure.    Was entire vial of medication used? Yes  Vial/Syringe: Single dose vial  Expiration Date:  10/21  Jane Breen RN

## 2019-08-09 NOTE — OR NURSING
Dr. Janee Villarreal back to bedside.  Patient still rating pain at 10.  Giving 25 mcg Fentanyl IV now.  Dr. Villarreal vO to start Dilaudid now and done.    
Dr. Janee Villarreal to bedside.  BP now 69/49.  Dr. Villarreal evaluated patient.  Next pressure 116/53.  Patient rating pain at 10.  Order to give 30 mg Ketolac Iv obtained and implemented.  Bolus 500 ml LR still infusing.    
Dr.Amy Villarreal notified of decreased BP of 81/41.  Order to give 500 ml bolus LR.  Told that jose Carson approached bedside and asked that he give 100 phenylephrine.  Dr. Alonso brought drug to bedside but patient BP increased with next pressure to 114 systolic.  Will continue to monitor.  Bolus infusing.  Phenylephrine not given at this time.    
Request made to 55 for private room.  Spoke to JONNA LAWSON.  
no

## 2019-08-12 ENCOUNTER — TRANSFERRED RECORDS (OUTPATIENT)
Dept: HEALTH INFORMATION MANAGEMENT | Facility: CLINIC | Age: 84
End: 2019-08-12

## 2020-01-02 ENCOUNTER — TELEPHONE (OUTPATIENT)
Dept: FAMILY MEDICINE | Facility: CLINIC | Age: 85
End: 2020-01-02

## 2020-01-02 NOTE — TELEPHONE ENCOUNTER
Reason for Call:  appointment    Detailed comments: Please verify if her Prolia Shot is covered for the 2020 Year  She would like to come in towards the end of Jan.    Phone Number Patient can be reached at: Home number on file [phone] (home)    Best Time: anytime    Can we leave a detailed message on this number? YES    Call taken on 1/2/2020 at 10:40 AM by Rhys Melara

## 2020-01-15 NOTE — TELEPHONE ENCOUNTER
[employer] verification completed for Prolia injection. Patient has 100% coverage for injection and can be scheduled whenever. No prior authorization is needed.    Fabian Vences CMA on 1/15/2020 at 9:22 AM

## 2020-01-27 ENCOUNTER — ALLIED HEALTH/NURSE VISIT (OUTPATIENT)
Dept: NURSING | Facility: CLINIC | Age: 85
End: 2020-01-27
Payer: MEDICARE

## 2020-01-27 DIAGNOSIS — M85.88 OSTEOPENIA OF LUMBAR SPINE: Primary | ICD-10-CM

## 2020-01-27 DIAGNOSIS — M81.0 AGE-RELATED OSTEOPOROSIS WITHOUT CURRENT PATHOLOGICAL FRACTURE: ICD-10-CM

## 2020-01-27 PROCEDURE — 99207 ZZC NO CHARGE NURSE ONLY: CPT

## 2020-01-27 PROCEDURE — 96372 THER/PROPH/DIAG INJ SC/IM: CPT

## 2020-03-13 ENCOUNTER — TRANSFERRED RECORDS (OUTPATIENT)
Dept: HEALTH INFORMATION MANAGEMENT | Facility: CLINIC | Age: 85
End: 2020-03-13

## 2020-06-03 ENCOUNTER — TELEPHONE (OUTPATIENT)
Dept: FAMILY MEDICINE | Facility: CLINIC | Age: 85
End: 2020-06-03

## 2020-06-03 DIAGNOSIS — S42.202K CLOSED FRACTURE OF PROXIMAL END OF LEFT HUMERUS WITH NONUNION, UNSPECIFIED FRACTURE MORPHOLOGY, SUBSEQUENT ENCOUNTER: Primary | ICD-10-CM

## 2020-06-03 DIAGNOSIS — Z51.89 ENCOUNTER FOR OTHER SPECIFIED AFTERCARE: ICD-10-CM

## 2020-06-03 DIAGNOSIS — M81.0 OSTEOPOROSIS: ICD-10-CM

## 2020-06-03 DIAGNOSIS — C50.919 MALIGNANT NEOPLASM OF FEMALE BREAST, UNSPECIFIED ESTROGEN RECEPTOR STATUS, UNSPECIFIED LATERALITY, UNSPECIFIED SITE OF BREAST (H): ICD-10-CM

## 2020-06-03 DIAGNOSIS — M81.6 LOCALIZED OSTEOPOROSIS (LEQUESNE): ICD-10-CM

## 2020-06-03 DIAGNOSIS — E78.5 HYPERLIPIDEMIA LDL GOAL <130: ICD-10-CM

## 2020-06-03 RX ORDER — SIMVASTATIN 20 MG
TABLET ORAL
Qty: 90 TABLET | Refills: 0 | Status: SHIPPED | OUTPATIENT
Start: 2020-06-03 | End: 2020-07-10

## 2020-06-03 NOTE — TELEPHONE ENCOUNTER
Reason for Call:  Other appointment    Detailed comments: pt says shes due for Prolia appt 24th of July or after    Phone Number Patient can be reached at: Home number on file [phone] (home)    Best Time: any    Can we leave a detailed message on this number? YES    Call taken on 6/3/2020 at 10:44 AM by Moris Casillas

## 2020-06-03 NOTE — PATIENT INSTRUCTIONS
PROLIA  Risk Evaluation and Mitigation Strategy Best Practice Advisory    In May 2015, the FDA required an update to the PROLIA  (denosumab) REMS (Risk Evaluation and Mitigation Strategy) to inform both providers and patients about potential serious risks related to PROLIA .    These potential serious risks include:    Hypocalcemia    Osteonecrosis of the jaw    Atypical femoral fractures    Serious Infections    Dermatologic reactions    To ensure compliance with these requirements [facility] has developed a workflow for those patients who will receive PROLIA  at clinic.  This workflow includes insurance verification, patient scheduling, patient education, and documentation. Registered Nurses in the clinic should have completed eLearning related to the REMS and the clinic workflow.  Refer to them to initiate this process.  This workflow does not need to be executed if the patient is to receive PROLIA  injection at [facility].       The  has put together a Patient Education Toolkit.  Copies of these handouts may be available your clinic. Patients must receive the Patient Brochure and Medication Guide when receiving injection at clinic.  These will be attached to the injection ordered from [facility] [employer] to the clinic. Links to handouts:  Patient Counseling Chart for Healthcare Providers  Patient Brochure  Prescribing Information  Medication Guide    If you are having trouble accessing the above links, these documents can be found at: [email or web address]://[email or web address].[email or web address]    The role of healthcare provider includes reviewing patient education materials with the patient, advising patients to seek medical attention if they have signs or symptoms of one of the serious risks, and provide patients a copy of the Patient Brochure and Medication Guide when receiving their injection.      Due to the risk of hypocalcemia the Prescribing  Information for PROLIA  recommends that calcium and mineral levels (magnesium and phosphorus) be checked within 14 days of injection for those predisposed to hypocalcemia.

## 2020-06-03 NOTE — TELEPHONE ENCOUNTER
Dr. Peguero,    Patient is due for Prolia injection. Please sign new order for medication list and route to RNs for scheduling.    Fabian Vences, CMA on 6/3/2020 at 2:29 PM

## 2020-06-04 NOTE — TELEPHONE ENCOUNTER
Called and scheduled patient for next Prolia injection.      Not due until 7/27/2020 (6 mths from last injection).     Franca ROMAN RN,BSN

## 2020-07-06 DIAGNOSIS — C50.919 MALIGNANT NEOPLASM OF FEMALE BREAST, UNSPECIFIED ESTROGEN RECEPTOR STATUS, UNSPECIFIED LATERALITY, UNSPECIFIED SITE OF BREAST (H): ICD-10-CM

## 2020-07-06 DIAGNOSIS — Z51.89 ENCOUNTER FOR OTHER SPECIFIED AFTERCARE: ICD-10-CM

## 2020-07-06 DIAGNOSIS — S42.202K CLOSED FRACTURE OF PROXIMAL END OF LEFT HUMERUS WITH NONUNION, UNSPECIFIED FRACTURE MORPHOLOGY, SUBSEQUENT ENCOUNTER: ICD-10-CM

## 2020-07-06 LAB
CALCIUM SERPL-MCNC: 9.2 MG/DL (ref 8.5–10.1)
MAGNESIUM SERPL-MCNC: 2 MG/DL (ref 1.6–2.3)
PHOSPHATE SERPL-MCNC: 3.7 MG/DL (ref 2.5–4.5)

## 2020-07-06 PROCEDURE — 36415 COLL VENOUS BLD VENIPUNCTURE: CPT | Performed by: INTERNAL MEDICINE

## 2020-07-06 PROCEDURE — 82310 ASSAY OF CALCIUM: CPT | Performed by: INTERNAL MEDICINE

## 2020-07-06 PROCEDURE — 83735 ASSAY OF MAGNESIUM: CPT | Performed by: INTERNAL MEDICINE

## 2020-07-06 PROCEDURE — 84100 ASSAY OF PHOSPHORUS: CPT | Performed by: INTERNAL MEDICINE

## 2020-07-10 ENCOUNTER — OFFICE VISIT (OUTPATIENT)
Dept: FAMILY MEDICINE | Facility: CLINIC | Age: 85
End: 2020-07-10
Payer: MEDICARE

## 2020-07-10 VITALS
WEIGHT: 149 LBS | HEIGHT: 64 IN | OXYGEN SATURATION: 95 % | DIASTOLIC BLOOD PRESSURE: 72 MMHG | SYSTOLIC BLOOD PRESSURE: 142 MMHG | TEMPERATURE: 97.5 F | HEART RATE: 80 BPM | BODY MASS INDEX: 25.44 KG/M2

## 2020-07-10 DIAGNOSIS — E78.5 HYPERLIPIDEMIA LDL GOAL <130: ICD-10-CM

## 2020-07-10 DIAGNOSIS — I10 ESSENTIAL HYPERTENSION WITH GOAL BLOOD PRESSURE LESS THAN 140/90: ICD-10-CM

## 2020-07-10 DIAGNOSIS — Z00.00 ROUTINE GENERAL MEDICAL EXAMINATION AT A HEALTH CARE FACILITY: Primary | ICD-10-CM

## 2020-07-10 DIAGNOSIS — K21.9 GASTROESOPHAGEAL REFLUX DISEASE, ESOPHAGITIS PRESENCE NOT SPECIFIED: ICD-10-CM

## 2020-07-10 DIAGNOSIS — M85.88 OSTEOPENIA OF LUMBAR SPINE: ICD-10-CM

## 2020-07-10 DIAGNOSIS — C50.919 MALIGNANT NEOPLASM OF FEMALE BREAST, UNSPECIFIED ESTROGEN RECEPTOR STATUS, UNSPECIFIED LATERALITY, UNSPECIFIED SITE OF BREAST (H): ICD-10-CM

## 2020-07-10 LAB
ERYTHROCYTE [DISTWIDTH] IN BLOOD BY AUTOMATED COUNT: 12.9 % (ref 10–15)
HCT VFR BLD AUTO: 40.1 % (ref 35–47)
HGB BLD-MCNC: 13.6 G/DL (ref 11.7–15.7)
MCH RBC QN AUTO: 32.6 PG (ref 26.5–33)
MCHC RBC AUTO-ENTMCNC: 33.9 G/DL (ref 31.5–36.5)
MCV RBC AUTO: 96 FL (ref 78–100)
PLATELET # BLD AUTO: 222 10E9/L (ref 150–450)
RBC # BLD AUTO: 4.17 10E12/L (ref 3.8–5.2)
WBC # BLD AUTO: 6 10E9/L (ref 4–11)

## 2020-07-10 PROCEDURE — 80053 COMPREHEN METABOLIC PANEL: CPT | Performed by: INTERNAL MEDICINE

## 2020-07-10 PROCEDURE — G0439 PPPS, SUBSEQ VISIT: HCPCS | Performed by: INTERNAL MEDICINE

## 2020-07-10 PROCEDURE — 82306 VITAMIN D 25 HYDROXY: CPT | Performed by: INTERNAL MEDICINE

## 2020-07-10 PROCEDURE — 36415 COLL VENOUS BLD VENIPUNCTURE: CPT | Performed by: INTERNAL MEDICINE

## 2020-07-10 PROCEDURE — 84443 ASSAY THYROID STIM HORMONE: CPT | Performed by: INTERNAL MEDICINE

## 2020-07-10 PROCEDURE — 85027 COMPLETE CBC AUTOMATED: CPT | Performed by: INTERNAL MEDICINE

## 2020-07-10 PROCEDURE — 80061 LIPID PANEL: CPT | Performed by: INTERNAL MEDICINE

## 2020-07-10 RX ORDER — AMLODIPINE BESYLATE 2.5 MG/1
2.5 TABLET ORAL DAILY
Qty: 90 TABLET | Refills: 3 | Status: SHIPPED | OUTPATIENT
Start: 2020-07-10 | End: 2021-06-08

## 2020-07-10 RX ORDER — IRBESARTAN AND HYDROCHLOROTHIAZIDE 150; 12.5 MG/1; MG/1
1 TABLET, FILM COATED ORAL DAILY
Qty: 90 TABLET | Refills: 3 | Status: SHIPPED | OUTPATIENT
Start: 2020-07-10 | End: 2021-07-12

## 2020-07-10 RX ORDER — SIMVASTATIN 20 MG
TABLET ORAL
Qty: 90 TABLET | Refills: 3 | Status: SHIPPED | OUTPATIENT
Start: 2020-07-10 | End: 2021-07-12

## 2020-07-10 RX ORDER — PANTOPRAZOLE SODIUM 40 MG/1
TABLET, DELAYED RELEASE ORAL
COMMUNITY
Start: 2017-04-26 | End: 2020-07-10

## 2020-07-10 RX ORDER — PANTOPRAZOLE SODIUM 40 MG/1
40 TABLET, DELAYED RELEASE ORAL DAILY
Qty: 60 TABLET | Refills: 3 | Status: SHIPPED | OUTPATIENT
Start: 2020-07-10 | End: 2020-07-13

## 2020-07-10 ASSESSMENT — MIFFLIN-ST. JEOR: SCORE: 1110.86

## 2020-07-10 ASSESSMENT — ACTIVITIES OF DAILY LIVING (ADL): CURRENT_FUNCTION: NO ASSISTANCE NEEDED

## 2020-07-10 NOTE — PATIENT INSTRUCTIONS
Start the new blood pressure medicine called amlodipine in addition to the other medicines.  If you can take this at night.  Check the blood pressure and let me know if it is not around 135/85    Agustin Peguero M.D.

## 2020-07-10 NOTE — LETTER
July 14, 2020      Aysha Rose  [address] [address]        Dear ,    We are writing to inform you of your test results.    It was a pleasure seeing you for your physical examination.  I wanted to get back to you with your test results.  I have enclosed a copy for your review.     I am happy to report that your cbc or complete blood count is normal with no signs of anemia, leukemia or platelet abnormalities. Your chemistry panel shows no diabetes.  Your blood salts, kidney tests, liver tests, and proteins are all fine.  Your vitamin D level and thyroid test are also normal.     Your total cholesterol is 174 with the normal range being below 200.  Your HDL or good cholesterol is 60 with the normal range being above 50.  Your LDL or bad cholesterol is 83 with the normal range being below 130.  These numbers are all fine.     I am happy to bring you this overall excellent report.  If you have any questions please call me.                   Resulted Orders   CBC with platelets   Result Value Ref Range    WBC 6.0 4.0 - 11.0 10e9/L    RBC Count 4.17 3.8 - 5.2 10e12/L    Hemoglobin 13.6 11.7 - 15.7 g/dL    Hematocrit 40.1 35.0 - 47.0 %    MCV 96 78 - 100 fl    MCH 32.6 26.5 - 33.0 pg    MCHC 33.9 31.5 - 36.5 g/dL    RDW 12.9 10.0 - 15.0 %    Platelet Count 222 150 - 450 10e9/L   Comprehensive metabolic panel   Result Value Ref Range    Sodium 138 133 - 144 mmol/L    Potassium 4.1 3.4 - 5.3 mmol/L    Chloride 105 94 - 109 mmol/L    Carbon Dioxide 28 20 - 32 mmol/L    Anion Gap 5 3 - 14 mmol/L    Glucose 103 (H) 70 - 99 mg/dL    Urea Nitrogen 19 7 - 30 mg/dL    Creatinine 0.76 0.52 - 1.04 mg/dL    GFR Estimate 71 >60 mL/min/[1.73_m2]      Comment:      Non  GFR Calc  Starting 12/18/2018, serum creatinine based estimated GFR (eGFR) will be   calculated using the Chronic Kidney Disease Epidemiology Collaboration   (CKD-EPI) equation.      GFR Estimate If Black 82 >60  mL/min/[1.73_m2]      Comment:       GFR Calc  Starting 12/18/2018, serum creatinine based estimated GFR (eGFR) will be   calculated using the Chronic Kidney Disease Epidemiology Collaboration   (CKD-EPI) equation.      Calcium 8.9 8.5 - 10.1 mg/dL    Bilirubin Total 0.4 0.2 - 1.3 mg/dL    Albumin 3.6 3.4 - 5.0 g/dL    Protein Total 6.9 6.8 - 8.8 g/dL    Alkaline Phosphatase 37 (L) 40 - 150 U/L    ALT 24 0 - 50 U/L    AST 19 0 - 45 U/L   Lipid panel reflex to direct LDL Non-fasting   Result Value Ref Range    Cholesterol 174 <200 mg/dL    Triglycerides 154 (H) <150 mg/dL      Comment:      Borderline high:  150-199 mg/dl  High:             200-499 mg/dl  Very high:       >499 mg/dl      HDL Cholesterol 60 >49 mg/dL    LDL Cholesterol Calculated 83 <100 mg/dL      Comment:      Desirable:       <100 mg/dl    Non HDL Cholesterol 114 <130 mg/dL   TSH with free T4 reflex   Result Value Ref Range    TSH 1.29 0.40 - 4.00 mU/L   Vitamin D Deficiency   Result Value Ref Range    Vitamin D Deficiency screening 56 20 - 75 ug/L      Comment:      Season, race, dietary intake, and treatment affect the concentration of   25-hydroxy-Vitamin D. Values may decrease during winter months and increase   during summer months. Values 20-29 ug/L may indicate Vitamin D insufficiency   and values <20 ug/L may indicate Vitamin D deficiency.  Vitamin D determination is routinely performed by an immunoassay specific for   25 hydroxyvitamin D3.  If an individual is on vitamin D2 (ergocalciferol)   supplementation, please specify 25 OH vitamin D2 and D3 level determination by   LCMSMS test [id number].         Sincerely,        Agustin Peguero MD/ eleni le

## 2020-07-10 NOTE — PROGRESS NOTES
SUBJECTIVE:   Aysha Rose is a 84 year old female who presents for Preventive Visit.    The patient is doing very well but her blood pressure has been in the 140s.  She takes her medicines regularly.  She has no complaints on review of systems and walks regularly.  She goes to the [facility] for her mammogram and breast exam.  She is taking Prolia shots every 6 months without difficulty.               Past Medical History:      Past Medical History:   Diagnosis Date     Abdominal pain 9/13    ct abd and pelvis neg     Benign essential hypertension     nl mr renogram     Breast cancer (H) 2007    lumpectomy and xrt [place], got 5 years of arimidex     Erosive gastritis Oct 2011    by egd, colon as above, also small ulcer     High cholesterol      Hx of colonoscopy 2007, 2011    nl, 2011 with cecal angioect and 2mm polyp     [place] (iron deficiency anaemia) 2011, 2016 2011 colonoscopy cecal angioectasia, egd with hh, small ulcer     Microscopic hematuria 2012    Dr. Alonzo, ct done 11/28/12 negative, cysto neg     Osteopenia 2008    [place], fu 2014 [place] and [facility] -1.5 left hip; fu here 7/18 a bit worse     Osteoporosis 2019    based on low impact fx of left shoulder     Syncope 2011    neg est echo             Past Surgical History:      Past Surgical History:   Procedure Laterality Date     CATARACT IOL, RT/LT       LUMPECTOMY BREAST  2007     XR SHOULDER SURGERY WENDY LEFT Left 03/2019    tco             Social History:     Social History     Socioeconomic History     Marital status:      Spouse name: Not on file     Number of children: 4     Years of education: Not on file     Highest education level: Not on file   Occupational History     Occupation: homemaker   Social Needs     Financial resource strain: Not on file     Food insecurity     Worry: Not on file     Inability: Not on file     Transportation needs     Medical: Not on file     Non-medical: Not on file   Tobacco Use     Smoking status: Never  Smoker     Smokeless tobacco: Never Used   Substance and Sexual Activity     Alcohol use: No     Drug use: No     Sexual activity: Yes     Partners: Male   Lifestyle     Physical activity     Days per week: Not on file     Minutes per session: Not on file     Stress: Not on file   Relationships     Social connections     Talks on phone: Not on file     Gets together: Not on file     Attends [employer] service: Not on file     Active member of club or organization: Not on file     Attends meetings of clubs or organizations: Not on file     Relationship status: Not on file     Intimate partner violence     Fear of current or ex partner: Not on file     Emotionally abused: Not on file     Physically abused: Not on file     Forced sexual activity: Not on file   Other Topics Concern     Parent/sibling w/ CABG, MI or angioplasty before 65F 55M? Not Asked   Social History Narrative     Not on file             Family History:   reviewed         Allergies:     Allergies   Allergen Reactions     Penicillins              Medications:     Current Outpatient Medications   Medication Sig Dispense Refill     amLODIPine (NORVASC) 2.5 MG tablet Take 1 tablet (2.5 mg) by mouth daily 90 tablet 3     irbesartan-hydrochlorothiazide (AVALIDE) 150-12.5 MG tablet Take 1 tablet by mouth daily 90 tablet 3     pantoprazole (PROTONIX) 40 MG EC tablet Take 1 tablet (40 mg) by mouth daily 60 tablet 3     simvastatin (ZOCOR) 20 MG tablet TAKE 1 TABLET(20 MG) BY MOUTH EVERY EVENING 90 tablet 3     acetaminophen (TYLENOL) 500 MG tablet Take 1,000 mg by mouth 4 times daily as needed for mild pain       calcium-vitamin D (CALCIUM 600 + D) 600-400 MG-UNIT per tablet Take 2 tablets by mouth every evening        cephALEXin (KEFLEX) 500 MG capsule TK 4 CS PO 30 TO 60 MINUTES PRIOR TO DENTAL WORK  2     denosumab (PROLIA) 60 MG/ML SOSY injection Inject 1 mL (60 mg) Subcutaneous every 6 months  0     denosumab (PROLIA) 60 MG/ML SOSY injection Inject 1 mL  (60 mg) Subcutaneous every 6 months 1 mL 1     ferrous sulfate ER (SLO-FE) 142 (45 Fe) MG CR tablet Take 142 mg by mouth every other day       polyethylene glycol (MIRALAX/GLYCOLAX) powder Take 1 capful by mouth daily as needed (While taking Norco)       Psyllium (METAMUCIL FREE & NATURAL PO) Take 3 Tablespoonful by mouth every evening       vitamin C (ASCORBIC ACID) 500 MG tablet Take 500 mg by mouth every other day                 Review of Systems:   The 10 point Review of Systems is negative other than noted in the HPI           Physical Exam:   Blood pressure (!) 142/72, pulse 80, not currently breastfeeding.    Exam:  Constitutional: healthy appearing, alert and in no distress  Heent: Normocephalic. Head without obvious masses or lesions. PERRLDC, EOMI. Mouth exam within normal limits: tongue, mucous membranes, posterior pharynx all normal, no lesions or abnormalities seen.  Tm's and canals within normal limits bilaterally. Neck supple, no nuchal rigidity or masses. No supraclavicular, or cervical adenopathy. Thyroid symmetric, no masses.  Cardiovascular: Regular rate and rhythm, no murmer, rub or gallops.  JVP not elevated, no edema.  Carotids within normal limits bilaterally, no bruits.  Respiratory: Normal respiratory effort.  Lungs clear, normal flow, no wheezing or crackles.  Gastrointestinal: Normal active bowel sounds.   Soft, not tender, no masses, guarding or rebound.  No hepatosplenomegaly.   Musculoskeletal: extremities normal, no gross deformities noted.  Skin: no suspicious lesions or rashes   Neurologic: Mental status within normal limits.  Speech fluent.  No gross motor abnormalities and gait intact.  Psychiatric: mentation appears normal and affect normal.         Data:   Labs sent        Assessment:   1. Normal complete physical exam  2. Hypertension, control not great, will add low dose of 2nd agent, doubt secondary cause  3. Breast ca, les  4. Osteopor, prolia, check labs  5. gerd  6. Health  "care maintenance  7. High cholesterol on statin         Plan:   Up to date immunizations, mammogram  Exercise, diet  Letter with labs  norvasc 2.5mg, she will monitor blood pressure and if not normal call        Agustin Peguero M.D.                Are you in the first 12 months of your Medicare coverage?  No    Healthy Habits:    In general, how would you rate your overall health?  Very good    Frequency of exercise:  6-7 days/week    Duration of exercise:  45-60 minutes    Do you usually eat at least 4 servings of fruit and vegetables a day, include whole grains    & fiber and avoid regularly eating high fat or \"junk\" foods?  No    Taking medications regularly:  No    Barriers to taking medications:  None    Medication side effects:  None    Ability to successfully perform activities of daily living:  No assistance needed    Home Safety:  No safety concerns identified    Hearing Impairment:  No hearing concerns    In the past 6 months, have you been bothered by leaking of urine? Yes    In general, how would you rate your overall mental or emotional health?  Good      PHQ-2 Total Score:    Additional concerns today:  No    Do you feel safe in your environment? Yes    Have you ever done Advance Care Planning? (For example, a Health Directive, POLST, or a discussion with a medical provider or your loved ones about your wishes): Yes, advance care planning is on file.      Fall risk       Cognitive Screening   1) Repeat 3 items (Leader, Season, Table)    2) Clock draw: NORMAL  3) 3 item recall: Recalls 3 objects  Results: 3 items recalled: COGNITIVE IMPAIRMENT LESS LIKELY    Mini-CogTM Copyright MANAV Carty. Licensed by the author for use in [facility]; reprinted with permission (horace@.[facility]). All rights reserved.      Do you have sleep apnea, excessive snoring or daytime drowsiness?: no    Reviewed and updated as needed this visit by clinical staff         Reviewed and updated as needed this visit by " "Provider        Social History     Tobacco Use     Smoking status: Never Smoker     Smokeless tobacco: Never Used   Substance Use Topics     Alcohol use: No         Alcohol Use 7/5/2019   Prescreen: >3 drinks/day or >7 drinks/week? No               Current providers sharing in care for this patient include: [facility] for mammogram  Patient Care Team:  Agustin Peguero MD as PCP - General (Internal Medicine)  Agustin Peguero MD as Assigned PCP    The following health maintenance items are reviewed in Epic and correct as of today:  Health Maintenance   Topic Date Due     ADVANCE CARE PLANNING  06/20/2019     PHQ-2  01/01/2020     FALL RISK ASSESSMENT  07/05/2020     MEDICARE ANNUAL WELLNESS VISIT  07/05/2020     INFLUENZA VACCINE (1) 09/01/2020     DTAP/TDAP/TD IMMUNIZATION (5 - Td) 09/24/2022     DEXA  Completed     PNEUMOCOCCAL IMMUNIZATION 65+ LOW/MEDIUM RISK  Completed     ZOSTER IMMUNIZATION  Completed     IPV IMMUNIZATION  Aged Out     MENINGITIS IMMUNIZATION  Aged Out           Review of Systems      OBJECTIVE:   There were no vitals taken for this visit. Estimated body mass index is 24.37 kg/m  as calculated from the following:    Height as of 7/5/19: 1.626 m (5' 4\").    Weight as of 7/5/19: 64.4 kg (142 lb).  Physical Exam          ASSESSMENT / PLAN:       COUNSELING:  Reviewed preventive health counseling, as reflected in patient instructions       Regular exercise       Healthy diet/nutrition    Estimated body mass index is 24.37 kg/m  as calculated from the following:    Height as of 7/5/19: 1.626 m (5' 4\").    Weight as of 7/5/19: 64.4 kg (142 lb).         reports that she has never smoked. She has never used smokeless tobacco.      Appropriate preventive services were discussed with this patient, including applicable screening as appropriate for cardiovascular disease, diabetes, osteopenia/osteoporosis, and glaucoma.  As appropriate for age/gender, discussed screening for colorectal cancer, " prostate cancer, breast cancer, and cervical cancer. Checklist reviewing preventive services available has been given to the patient.    Reviewed patients plan of care and provided an AVS. The Basic Care Plan (routine screening as documented in Health Maintenance) for Aysha meets the Care Plan requirement. This Care Plan has been established and reviewed with the Patient.    Counseling Resources:  ATP IV Guidelines  Pooled Cohorts Equation Calculator  Breast Cancer Risk Calculator  FRAX Risk Assessment  ICSI Preventive Guidelines  Dietary Guidelines for Americans, 2010  USDA's MyPlate  ASA Prophylaxis  Lung CA Screening    Agustin Peguero MD  [facility]    Identified Health Risks:

## 2020-07-11 LAB
ALBUMIN SERPL-MCNC: 3.6 G/DL (ref 3.4–5)
ALP SERPL-CCNC: 37 U/L (ref 40–150)
ALT SERPL W P-5'-P-CCNC: 24 U/L (ref 0–50)
ANION GAP SERPL CALCULATED.3IONS-SCNC: 5 MMOL/L (ref 3–14)
AST SERPL W P-5'-P-CCNC: 19 U/L (ref 0–45)
BILIRUB SERPL-MCNC: 0.4 MG/DL (ref 0.2–1.3)
BUN SERPL-MCNC: 19 MG/DL (ref 7–30)
CALCIUM SERPL-MCNC: 8.9 MG/DL (ref 8.5–10.1)
CHLORIDE SERPL-SCNC: 105 MMOL/L (ref 94–109)
CHOLEST SERPL-MCNC: 174 MG/DL
CO2 SERPL-SCNC: 28 MMOL/L (ref 20–32)
CREAT SERPL-MCNC: 0.76 MG/DL (ref 0.52–1.04)
GFR SERPL CREATININE-BSD FRML MDRD: 71 ML/MIN/{1.73_M2}
GLUCOSE SERPL-MCNC: 103 MG/DL (ref 70–99)
HDLC SERPL-MCNC: 60 MG/DL
LDLC SERPL CALC-MCNC: 83 MG/DL
NONHDLC SERPL-MCNC: 114 MG/DL
POTASSIUM SERPL-SCNC: 4.1 MMOL/L (ref 3.4–5.3)
PROT SERPL-MCNC: 6.9 G/DL (ref 6.8–8.8)
SODIUM SERPL-SCNC: 138 MMOL/L (ref 133–144)
TRIGL SERPL-MCNC: 154 MG/DL
TSH SERPL DL<=0.005 MIU/L-ACNC: 1.29 MU/L (ref 0.4–4)

## 2020-07-13 LAB — DEPRECATED CALCIDIOL+CALCIFEROL SERPL-MC: 56 UG/L (ref 20–75)

## 2020-07-13 RX ORDER — PANTOPRAZOLE SODIUM 40 MG/1
TABLET, DELAYED RELEASE ORAL
Qty: 90 TABLET | Refills: 3 | Status: SHIPPED | OUTPATIENT
Start: 2020-07-13 | End: 2022-01-11

## 2020-07-13 NOTE — TELEPHONE ENCOUNTER
Routing refill request to provider for review/approval because:  Diagnosis of osteoporosis on record    Please review and authorize if appropriate.    Thank you,   Kristopher RAMSEY RN

## 2020-07-14 NOTE — RESULT ENCOUNTER NOTE
It was a pleasure seeing you for your physical examination.  I wanted to get back to you with your test results.  I have enclosed a copy for your review.     I am happy to report that your cbc or complete blood count is normal with no signs of anemia, leukemia or platelet abnormalities. Your chemistry panel shows no diabetes.  Your blood salts, kidney tests, liver tests, and proteins are all fine.  Your vitamin D level and thyroid test are also normal.    Your total cholesterol is 174 with the normal range being below 200.  Your HDL or good cholesterol is 60 with the normal range being above 50.  Your LDL or bad cholesterol is 83 with the normal range being below 130.  These numbers are all fine.    I am happy to bring you this overall excellent report.  If you have any questions please call me.

## 2020-07-20 ENCOUNTER — TELEPHONE (OUTPATIENT)
Dept: FAMILY MEDICINE | Facility: CLINIC | Age: 85
End: 2020-07-20

## 2020-07-20 NOTE — TELEPHONE ENCOUNTER
Reason for call:  Results   Name of test or procedure: blood work  Date of test or procedure: 7/12  Location of test or procedure: [facility]    Additional comments:     Phone number to reach patient:  Cell number on file:    Telephone Information:   Mobile [phone]       Best Time:  anytime    Can we leave a detailed message on this number?  YES

## 2020-07-28 ENCOUNTER — ALLIED HEALTH/NURSE VISIT (OUTPATIENT)
Dept: NURSING | Facility: CLINIC | Age: 85
End: 2020-07-28
Payer: MEDICARE

## 2020-07-28 DIAGNOSIS — M81.0 OSTEOPOROSIS: Primary | ICD-10-CM

## 2020-07-28 PROCEDURE — 99207 ZZC NO CHARGE NURSE ONLY: CPT

## 2020-07-28 PROCEDURE — 96372 THER/PROPH/DIAG INJ SC/IM: CPT

## 2020-07-28 NOTE — NURSING NOTE
Clinic Administered Medication Documentation      Prolia Documentation    Prior to injection, verified patient identity using patient's name and date of birth. Medication was administered. Please see MAR and medication order for additional information. Patient instructed to remain in clinic for 15 minutes and report any adverse reaction to staff immediately .    Indication: Prolia  (denosumab) is a prescription medicine used to treat osteoporosis in patients who:     Are at high risk for fracture, meaning patients who have had a fracture related to osteoporosis, or who have multiple risk factors for fracture.    Cannot use another osteoporosis medicine or other osteoporosis medicines did not work well.    The timeline for early/late injections would be 4 weeks early and any time after the 6 month maria dolores. If a patient receives their injection late, then the subsequent injection would be 6 months from the date that they actually received the injection.    1.  When was the last injection?  1/26/20  2.  Did they check with their insurance for this calendar year?  Yes.  80% Medicare and 20% BCBS supplement  3.  Is there an order in the chart?  yes  4.  Has the patient had dental work involving the bone in the past month or will have work in the next 6 months?  No    The following steps were completed to comply with the REMS program for Prolia:    Reviewed information in the Medication Guide and Patient Counseling Chart, including the serious risks of Prolia  and the symptoms of each risk.    Advised patient to seek prompt medical attention if they have signs or symptoms of any of the serious risks.    Provided each patient a copy of the Medication Guide and Patient Brochure.    Was entire vial of medication used? Yes  Vial/Syringe: Syringe  Expiration Date:  11/22  Was this medication supplied by the patient? No     Niurka Crump RN on 7/28/2020 at 10:27 AM            Referral Notes   Number of Notes: 1   Type  Date  User   Summary  Attachment    Insurance  06/04/2020 12:22 PM  Berto Mason  2020 INSURANCE COVERAGE  -    Note     Insurance name: Medicare   Location: [facility]  % covered: 80       Insurance name: Blue [employer] Blue Shield Senior Gold (Medicare Supplement)   Location: [facility]  % covered: 20

## 2020-08-11 ENCOUNTER — ALLIED HEALTH/NURSE VISIT (OUTPATIENT)
Dept: NURSING | Facility: CLINIC | Age: 85
End: 2020-08-11
Payer: MEDICARE

## 2020-08-11 VITALS
HEIGHT: 64 IN | HEART RATE: 74 BPM | TEMPERATURE: 97.7 F | DIASTOLIC BLOOD PRESSURE: 73 MMHG | WEIGHT: 149 LBS | SYSTOLIC BLOOD PRESSURE: 115 MMHG | OXYGEN SATURATION: 96 % | BODY MASS INDEX: 25.44 KG/M2

## 2020-08-11 DIAGNOSIS — I10 HTN (HYPERTENSION): Primary | ICD-10-CM

## 2020-08-11 PROCEDURE — 99207 ZZC NO CHARGE NURSE ONLY: CPT

## 2020-08-11 ASSESSMENT — MIFFLIN-ST. JEOR: SCORE: 1110.86

## 2020-08-11 NOTE — PROGRESS NOTES
"Aysha Rose is a 84 year old patient who comes in today for a Blood Pressure check.  Initial BP:  /73 (BP Location: Left arm, Cuff Size: Adult Large)   Pulse 74   Temp 97.7  F (36.5  C) (Oral)   Ht 1.626 m (5' 4\")   Wt 67.6 kg (149 lb)   SpO2 96%   BMI 25.58 kg/m         Disposition: results routed to provider    Judy Elise MA      "

## 2020-12-16 ENCOUNTER — TELEPHONE (OUTPATIENT)
Dept: FAMILY MEDICINE | Facility: CLINIC | Age: 85
End: 2020-12-16

## 2020-12-16 NOTE — TELEPHONE ENCOUNTER
Placed call to patient in regards to Prolia injectrion. Discussed with patient that at the start of the new year, new insurance verification will be done by the central team. Patient has scheduled appointment for 01/29/2020 and that should allow adequate time to get verification done. Patient is agreeable with this information and I will contact patient about 1 or 2 weeks into the new year to inform her of her insurance coverage.    Fabian Vences CMA on 12/16/2020 at 2:09 PM

## 2020-12-16 NOTE — TELEPHONE ENCOUNTER
Reason for Call: Request for an order or referral:    Order or referral being requested: Prolia shot    Date needed: before my next appointment    Has the patient been seen by the PCP for this problem? YES    Additional comments: NA    Phone number Patient can be reached at:  Home number on file [phone] (home)    Best Time:  Any    Can we leave a detailed message on this number?  YES    Call taken on 12/16/2020 at 8:28 AM by Mayuri Chin

## 2021-01-11 NOTE — TELEPHONE ENCOUNTER
Patient's Prolia is covered for 2021 at 100%. Patient can be scheduled for Prolia injection on or after February 11, 2021.    Fabian Vences CMA on 1/11/2021 at 11:46 AM

## 2021-02-11 ENCOUNTER — ALLIED HEALTH/NURSE VISIT (OUTPATIENT)
Dept: NURSING | Facility: CLINIC | Age: 86
End: 2021-02-11
Payer: MEDICARE

## 2021-02-11 DIAGNOSIS — M81.0 AGE-RELATED OSTEOPOROSIS WITHOUT CURRENT PATHOLOGICAL FRACTURE: Primary | ICD-10-CM

## 2021-02-11 PROCEDURE — 96372 THER/PROPH/DIAG INJ SC/IM: CPT

## 2021-02-11 PROCEDURE — 99207 PR NO CHARGE NURSE ONLY: CPT

## 2021-02-11 NOTE — PROGRESS NOTES
Clinic Administered Medication Documentation      Prolia Documentation    Prior to injection, verified patient identity using patient's name and date of birth. Medication was administered. Please see MAR and medication order for additional information. Patient instructed to remain in clinic for 15 minutes and report any adverse reaction to staff immediately .    Indication: Prolia  (denosumab) is a prescription medicine used to treat osteoporosis in patients who:     Are at high risk for fracture, meaning patients who have had a fracture related to osteoporosis, or who have multiple risk factors for fracture.    Cannot use another osteoporosis medicine or other osteoporosis medicines did not work well.    The timeline for early/late injections would be 4 weeks early and any time after the 6 month maria dolores. If a patient receives their injection late, then the subsequent injection would be 6 months from the date that they actually received the injection.    1.  When was the last injection?  7/28/2020  2.  Did they check with their insurance for this calendar year?  yes  3.  Is there an order in the chart?  yes  4.  Has the patient had dental work involving the bone in the past month or will have work in the next 6 months?  Denies    The following steps were completed to comply with the REMS program for Prolia:    Reviewed information in the Medication Guide and Patient Counseling Chart, including the serious risks of Prolia  and the symptoms of each risk.    Advised patient to seek prompt medical attention if they have signs or symptoms of any of the serious risks.    Provided each patient a copy of the Medication Guide and Patient Brochure.    Was entire vial of medication used? Yes  Vial/Syringe: Syringe  Expiration Date:  05/23  Was this medication supplied by the patient? No

## 2021-02-17 ENCOUNTER — IMMUNIZATION (OUTPATIENT)
Dept: NURSING | Facility: CLINIC | Age: 86
End: 2021-02-17
Payer: MEDICARE

## 2021-02-17 PROCEDURE — 0001A PR COVID VAC PFIZER DIL RECON 30 MCG/0.3 ML IM: CPT

## 2021-02-17 PROCEDURE — 91300 PR COVID VAC PFIZER DIL RECON 30 MCG/0.3 ML IM: CPT

## 2021-02-18 ENCOUNTER — TRANSFERRED RECORDS (OUTPATIENT)
Dept: HEALTH INFORMATION MANAGEMENT | Facility: CLINIC | Age: 86
End: 2021-02-18

## 2021-03-10 ENCOUNTER — IMMUNIZATION (OUTPATIENT)
Dept: NURSING | Facility: CLINIC | Age: 86
End: 2021-03-10
Attending: INTERNAL MEDICINE
Payer: MEDICARE

## 2021-03-10 PROCEDURE — 91300 PR COVID VAC PFIZER DIL RECON 30 MCG/0.3 ML IM: CPT

## 2021-03-10 PROCEDURE — 0002A PR COVID VAC PFIZER DIL RECON 30 MCG/0.3 ML IM: CPT

## 2021-05-05 ENCOUNTER — NURSE TRIAGE (OUTPATIENT)
Dept: FAMILY MEDICINE | Facility: CLINIC | Age: 86
End: 2021-05-05

## 2021-05-05 NOTE — TELEPHONE ENCOUNTER
"Dr. Peguero,  Can you work this pt in tomorrow or next day, or can I use virtual spot Friday?    Very pleasant pt c/o intermittent abd discomfort ongoing 1 month, worsening in last few days.    Left sided abd discomfort  \" At first felt like  the reaction I had when I took iron.  Felt like when you run and get a stitch in your side like a cramp, and I figured I can live with that\"    Pt stopped iron 4 weeks ago due to symptoms and some constipation  Uses miralax prn for constipation, but has constipation at baseline.    Does possibly recall some symptom relief after BM's however now as of past 3 days, reports symptoms much more noticeable.  \"I'm aware of it, and rather strong pressurein front or back on left side, right at waist.\"   Denies n/v, lightheadedness/dizziness, bloody stools, severe abd pain, other concerning sx.      Can this pt be worked into schedule?  If so, when?  (Pt does note Hair appt tomorrow at 930, but states she can move it, and is available anytime for Dr. Peguero)    Call back, ok to leave detailed message  [phone]       Additional Information    Negative: Passed out (i.e., fainted, collapsed and was not responding)    Negative: Shock suspected (e.g., cold/pale/clammy skin, too weak to stand, low BP, rapid pulse)    Negative: Visible sweat on face or sweat is dripping down    Negative: Chest pain    Negative: SEVERE abdominal pain (e.g., excruciating)    Negative: Pain lasting > 10 minutes and over 50 years old    Negative: Pain lasting > 10 minutes and over 40 years old and associated chest, arm, neck, upper back, or jaw pain    Negative: Pain lasting > 10 minutes and over 35 years old and at least one cardiac risk factor    Negative: Pain lasting > 10 minutes and history of heart disease (i.e., heart attack, bypass surgery, angina, angioplasty, CHF)    Negative: Recent injury to the abdomen    Negative: Vomiting red blood or black (coffee ground) material    Negative: Bloody, black, or " tarry bowel movements    Negative: Pregnant > 24 weeks and hand or face swelling    Negative: Constant abdominal pain lasting > 2 hours    Negative: Vomiting bile (green color)    Negative: Patient sounds very sick or weak to the triager    Negative: Vomiting and abdomen looks much more swollen than usual    Negative: White of the eyes have turned yellow (i.e.,  jaundice)    Negative: Fever > 103 F (39.4 C)    Negative: Fever > 101 F (38.3 C) and over 60 years of age    Negative: Fever > 100.0 F (37.8 C) and has diabetes mellitus or a weak immune system (e.g., HIV positive, cancer chemotherapy, organ transplant, splenectomy, chronic steroids)    Negative: Fever > 100.0 F (37.8 C) and bedridden (e.g., nursing home patient, stroke, chronic illness, recovering from surgery)    Age > 60 years    Protocols used: ABDOMINAL PAIN - UPPER-A-OH

## 2021-05-05 NOTE — TELEPHONE ENCOUNTER
Pt called back and confirmed this works for her    Next 5 appointments (look out 90 days)    May 06, 2021 11:45 AM  Office Visit with Agustin Peguero MD  [facility] ([facility] ) [address] [place] [address]  [phone]   Jul 12, 2021 10:00 AM  PHYSICAL with Agustin Peguero MD  [facility] ([facility] ) [address] [place] [address]  [phone]   Jul 30, 2021 10:00 AM  Nurse Only with CS JAMESON NURSE  [facility] ([facility] ) [address] [place] [address]  [phone]

## 2021-05-05 NOTE — TELEPHONE ENCOUNTER
Appt was scheduled. Left a detailed voice message asking pt to call clinic to confirm she will be able to keep appointment.

## 2021-05-06 ENCOUNTER — OFFICE VISIT (OUTPATIENT)
Dept: FAMILY MEDICINE | Facility: CLINIC | Age: 86
End: 2021-05-06
Payer: MEDICARE

## 2021-05-06 VITALS
OXYGEN SATURATION: 98 % | HEART RATE: 65 BPM | TEMPERATURE: 97 F | SYSTOLIC BLOOD PRESSURE: 134 MMHG | HEIGHT: 64 IN | WEIGHT: 149 LBS | BODY MASS INDEX: 25.44 KG/M2 | DIASTOLIC BLOOD PRESSURE: 79 MMHG

## 2021-05-06 DIAGNOSIS — C50.919 MALIGNANT NEOPLASM OF FEMALE BREAST, UNSPECIFIED ESTROGEN RECEPTOR STATUS, UNSPECIFIED LATERALITY, UNSPECIFIED SITE OF BREAST (H): ICD-10-CM

## 2021-05-06 DIAGNOSIS — R10.9 LEFT FLANK PAIN: Primary | ICD-10-CM

## 2021-05-06 DIAGNOSIS — R82.90 NONSPECIFIC FINDING ON EXAMINATION OF URINE: ICD-10-CM

## 2021-05-06 LAB

## 2021-05-06 PROCEDURE — 87086 URINE CULTURE/COLONY COUNT: CPT | Performed by: INTERNAL MEDICINE

## 2021-05-06 PROCEDURE — 99214 OFFICE O/P EST MOD 30 MIN: CPT | Performed by: INTERNAL MEDICINE

## 2021-05-06 PROCEDURE — 81001 URINALYSIS AUTO W/SCOPE: CPT | Performed by: INTERNAL MEDICINE

## 2021-05-06 ASSESSMENT — MIFFLIN-ST. JEOR: SCORE: 1105.86

## 2021-05-06 NOTE — PROGRESS NOTES
Patient presents for abdominal discomfort.  Its been ongoing for 2 months.  It started more in the left mid abdomen and then in the left lower abdomen but now it is in the left flank region.  It comes and goes.  When she is lying down she does not feel it.  Food is not affected.  Urination and bowel movement is not affected.  No fevers, night sweats or weight loss.  No other abdominal pain or nausea or vomiting.  No vaginal burning blood or discharge.  No urinary burning blood or discharge.  No new activities.  In general she feels good.    Past Medical History:   Diagnosis Date     Abdominal pain 9/13    ct abd and pelvis neg     Benign essential hypertension     nl mr renogram     Breast cancer (H) 2007    lumpectomy and xrt [place], got 5 years of arimidex     Erosive gastritis Oct 2011    by egd, colon as above, also small ulcer     High cholesterol      Hx of colonoscopy 2007, 2011    nl, 2011 with cecal angioect and 2mm polyp     [place] (iron deficiency anaemia) 2011, 2016 2011 colonoscopy cecal angioectasia, egd with hh, small ulcer     Microscopic hematuria 2012    Dr. Alonzo, ct done 11/28/12 negative, cysto neg     Osteopenia 2008    [place], fu 2014 [place] and worse -1.5 left hip; fu here 7/18 a bit worse     Osteoporosis 2019    based on low impact fx of left shoulder, added prolia then     Syncope 2011    neg est echo     Past Surgical History:   Procedure Laterality Date     CATARACT IOL, RT/LT       LUMPECTOMY BREAST  2007     XR SHOULDER SURGERY WEND[place] LEFT Left 03/2019    tco     Social History     Socioeconomic History     Marital status:      Spouse name: Not on file     Number of children: 4     Years of education: Not on file     Highest education level: Not on file   Occupational History     Occupation: homemaker   Social Needs     Financial resource strain: Not on file     Food insecurity     Worry: Not on file     Inability: Not on file     Transportation needs     Medical: Not on file      Non-medical: Not on file   Tobacco Use     Smoking status: Never Smoker     Smokeless tobacco: Never Used   Substance and Sexual Activity     Alcohol use: No     Drug use: No     Sexual activity: Yes     Partners: Male   Lifestyle     Physical activity     Days per week: Not on file     Minutes per session: Not on file     Stress: Not on file   Relationships     Social connections     Talks on phone: Not on file     Gets together: Not on file     Attends [employer] service: Not on file     Active member of club or organization: Not on file     Attends meetings of clubs or organizations: Not on file     Relationship status: Not on file     Intimate partner violence     Fear of current or ex partner: Not on file     Emotionally abused: Not on file     Physically abused: Not on file     Forced sexual activity: Not on file   Other Topics Concern     Parent/sibling w/ CABG, MI or angioplasty before 65F 55M? Not Asked   Social History Narrative     Not on file     Current Outpatient Medications   Medication Sig Dispense Refill     acetaminophen (TYLENOL) 500 MG tablet Take 1,000 mg by mouth 4 times daily as needed for mild pain       amLODIPine (NORVASC) 2.5 MG tablet Take 1 tablet (2.5 mg) by mouth daily 90 tablet 3     calcium-vitamin D (CALCIUM 600 + D) 600-400 MG-UNIT per tablet Take 2 tablets by mouth every evening        cephALEXin (KEFLEX) 500 MG capsule TK 4 CS PO 30 TO 60 MINUTES PRIOR TO DENTAL WORK  2     denosumab (PROLIA) 60 MG/ML SOSY injection Inject 1 mL (60 mg) Subcutaneous every 6 months  0     denosumab (PROLIA) 60 MG/ML SOSY injection Inject 1 mL (60 mg) Subcutaneous every 6 months 1 mL 1     ferrous sulfate ER (SLO-FE) 142 (45 Fe) MG CR tablet Take 142 mg by mouth every other day       irbesartan-hydrochlorothiazide (AVALIDE) 150-12.5 MG tablet Take 1 tablet by mouth daily 90 tablet 3     pantoprazole (PROTONIX) 40 MG EC tablet TAKE 1 TABLET(40 MG) BY MOUTH DAILY 90 tablet 3     polyethylene glycol  "(MIRALAX/GLYCOLAX) powder Take 1 capful by mouth daily as needed (While taking Norco)       Psyllium (METAMUCIL FREE & NATURAL PO) Take 3 Tablespoonful by mouth every evening       simvastatin (ZOCOR) 20 MG tablet TAKE 1 TABLET(20 MG) BY MOUTH EVERY EVENING 90 tablet 3     vitamin C (ASCORBIC ACID) 500 MG tablet Take 500 mg by mouth every other day       Allergies   Allergen Reactions     Penicillins      FAMILY HISTORY NOTED AND REVIEWED    REVIEW OF SYSTEMS: above    PHYSICAL EXAM    /79 (BP Location: Left arm, Patient Position: Chair, Cuff Size: Adult Large)   Pulse 65   Temp 97  F (36.1  C) (Oral)   Ht 1.626 m (5' 4\")   Wt 67.6 kg (149 lb)   SpO2 98%   Breastfeeding No   BMI 25.58 kg/m      Patient appears non toxic  Abdomen normal active bowel sounds, soft, non-tender, no mgr, no hepatosplenomegaly  Pelvic: External genitalia within normal limits.  No masses or lesions.  Speculum exam unremarkable.  Cervix intact, no lesions. No significant discharge seen. Bimanual exam within normal limits.  No abnormal masses felt and no tenderness.  Recto-vaginal exam within normal limits.  Exam chaperoned by nurse.  No flank lesions or tenderness    ASSESSMENT:  Left flank pain, needs eval, cause not clear.  Has prior ca breast but I doubt related, doubt stone or tumor    PLAN:  ua  Ct abdomen and pelvis    Agustin Peguero M.D.          "

## 2021-05-07 LAB
BACTERIA SPEC CULT: NORMAL
Lab: NORMAL
SPECIMEN SOURCE: NORMAL

## 2021-05-08 NOTE — RESULT ENCOUNTER NOTE
It was a please seeing you.  You should be able to view your labs.    No signs of a urine infection.  Let's see what the ct scan shows.    Agustin

## 2021-05-10 ENCOUNTER — HOSPITAL ENCOUNTER (OUTPATIENT)
Dept: CT IMAGING | Facility: CLINIC | Age: 86
Discharge: HOME | End: 2021-05-10
Attending: INTERNAL MEDICINE | Admitting: INTERNAL MEDICINE
Payer: MEDICARE

## 2021-05-10 DIAGNOSIS — R10.9 LEFT FLANK PAIN: ICD-10-CM

## 2021-05-10 LAB
CREAT BLD-MCNC: 0.8 MG/DL (ref 0.52–1.04)
GFR SERPL CREATININE-BSD FRML MDRD: 68 ML/MIN/{1.73_M2}

## 2021-05-10 PROCEDURE — 250N000011 HC RX IP 250 OP 636: Performed by: INTERNAL MEDICINE

## 2021-05-10 PROCEDURE — 82565 ASSAY OF CREATININE: CPT

## 2021-05-10 PROCEDURE — 250N000009 HC RX 250: Performed by: INTERNAL MEDICINE

## 2021-05-10 PROCEDURE — G1004 CDSM NDSC: HCPCS

## 2021-05-10 RX ORDER — IOPAMIDOL 755 MG/ML
74 INJECTION, SOLUTION INTRAVASCULAR ONCE
Status: COMPLETED | OUTPATIENT
Start: 2021-05-10 | End: 2021-05-10

## 2021-05-10 RX ADMIN — SODIUM CHLORIDE 61 ML: 9 INJECTION, SOLUTION INTRAVENOUS at 17:10

## 2021-05-10 RX ADMIN — IOPAMIDOL 74 ML: 755 INJECTION, SOLUTION INTRAVENOUS at 17:10

## 2021-05-11 NOTE — RESULT ENCOUNTER NOTE
Samuel Olmstead,    I have had the opportunity to review your recent results and an interpretation is as follows:  Your CT of abdomen showed no concerning processes     Sincerely,  David Cedillo MD

## 2021-06-07 DIAGNOSIS — I10 ESSENTIAL HYPERTENSION WITH GOAL BLOOD PRESSURE LESS THAN 140/90: ICD-10-CM

## 2021-06-08 RX ORDER — AMLODIPINE BESYLATE 2.5 MG/1
2.5 TABLET ORAL DAILY
Qty: 90 TABLET | Refills: 3 | Status: SHIPPED | OUTPATIENT
Start: 2021-06-08 | End: 2022-03-15

## 2021-07-07 DIAGNOSIS — Z00.00 ENCOUNTER FOR ANNUAL PHYSICAL EXAM: ICD-10-CM

## 2021-07-07 DIAGNOSIS — I10 BENIGN ESSENTIAL HYPERTENSION: ICD-10-CM

## 2021-07-07 DIAGNOSIS — D50.9 IRON DEFICIENCY ANEMIA, UNSPECIFIED IRON DEFICIENCY ANEMIA TYPE: ICD-10-CM

## 2021-07-07 DIAGNOSIS — E78.5 HYPERLIPIDEMIA LDL GOAL <130: ICD-10-CM

## 2021-07-07 LAB
ALBUMIN SERPL-MCNC: 3.5 G/DL (ref 3.4–5)
ALP SERPL-CCNC: 36 U/L (ref 40–150)
ALT SERPL W P-5'-P-CCNC: 29 U/L (ref 0–50)
ANION GAP SERPL CALCULATED.3IONS-SCNC: 6 MMOL/L (ref 3–14)
AST SERPL W P-5'-P-CCNC: 22 U/L (ref 0–45)
BILIRUB SERPL-MCNC: 0.4 MG/DL (ref 0.2–1.3)
BUN SERPL-MCNC: 14 MG/DL (ref 7–30)
CALCIUM SERPL-MCNC: 9.6 MG/DL (ref 8.5–10.1)
CHLORIDE SERPL-SCNC: 102 MMOL/L (ref 94–109)
CHOLEST SERPL-MCNC: 169 MG/DL
CO2 SERPL-SCNC: 31 MMOL/L (ref 20–32)
CREAT SERPL-MCNC: 0.87 MG/DL (ref 0.52–1.04)
ERYTHROCYTE [DISTWIDTH] IN BLOOD BY AUTOMATED COUNT: 13 % (ref 10–15)
FERRITIN SERPL-MCNC: 108 NG/ML (ref 8–252)
GFR SERPL CREATININE-BSD FRML MDRD: 61 ML/MIN/{1.73_M2}
GLUCOSE SERPL-MCNC: 97 MG/DL (ref 70–99)
HCT VFR BLD AUTO: 39.6 % (ref 35–47)
HDLC SERPL-MCNC: 63 MG/DL
HGB BLD-MCNC: 13.4 G/DL (ref 11.7–15.7)
IRON SATN MFR SERPL: 20 % (ref 15–46)
IRON SERPL-MCNC: 59 UG/DL (ref 35–180)
LDLC SERPL CALC-MCNC: 90 MG/DL
MCH RBC QN AUTO: 32.6 PG (ref 26.5–33)
MCHC RBC AUTO-ENTMCNC: 33.8 G/DL (ref 31.5–36.5)
MCV RBC AUTO: 96 FL (ref 78–100)
NONHDLC SERPL-MCNC: 106 MG/DL
PLATELET # BLD AUTO: 222 10E9/L (ref 150–450)
POTASSIUM SERPL-SCNC: 4.4 MMOL/L (ref 3.4–5.3)
PROT SERPL-MCNC: 6.6 G/DL (ref 6.8–8.8)
RBC # BLD AUTO: 4.11 10E12/L (ref 3.8–5.2)
SODIUM SERPL-SCNC: 139 MMOL/L (ref 133–144)
TIBC SERPL-MCNC: 288 UG/DL (ref 240–430)
TRIGL SERPL-MCNC: 80 MG/DL
WBC # BLD AUTO: 5.2 10E9/L (ref 4–11)

## 2021-07-07 PROCEDURE — 80061 LIPID PANEL: CPT | Performed by: INTERNAL MEDICINE

## 2021-07-07 PROCEDURE — 83550 IRON BINDING TEST: CPT | Performed by: INTERNAL MEDICINE

## 2021-07-07 PROCEDURE — 82728 ASSAY OF FERRITIN: CPT | Performed by: INTERNAL MEDICINE

## 2021-07-07 PROCEDURE — 85027 COMPLETE CBC AUTOMATED: CPT | Performed by: INTERNAL MEDICINE

## 2021-07-07 PROCEDURE — 36415 COLL VENOUS BLD VENIPUNCTURE: CPT | Performed by: INTERNAL MEDICINE

## 2021-07-07 PROCEDURE — 83540 ASSAY OF IRON: CPT | Performed by: INTERNAL MEDICINE

## 2021-07-07 PROCEDURE — 80053 COMPREHEN METABOLIC PANEL: CPT | Performed by: INTERNAL MEDICINE

## 2021-07-12 ENCOUNTER — OFFICE VISIT (OUTPATIENT)
Dept: FAMILY MEDICINE | Facility: CLINIC | Age: 86
End: 2021-07-12
Payer: MEDICARE

## 2021-07-12 VITALS
WEIGHT: 145 LBS | TEMPERATURE: 97 F | HEART RATE: 71 BPM | OXYGEN SATURATION: 98 % | SYSTOLIC BLOOD PRESSURE: 132 MMHG | DIASTOLIC BLOOD PRESSURE: 83 MMHG | BODY MASS INDEX: 24.75 KG/M2 | HEIGHT: 64 IN

## 2021-07-12 DIAGNOSIS — I10 ESSENTIAL HYPERTENSION WITH GOAL BLOOD PRESSURE LESS THAN 140/90: ICD-10-CM

## 2021-07-12 DIAGNOSIS — E78.5 HYPERLIPIDEMIA LDL GOAL <130: ICD-10-CM

## 2021-07-12 DIAGNOSIS — Z00.00 MEDICARE ANNUAL WELLNESS VISIT, SUBSEQUENT: Primary | ICD-10-CM

## 2021-07-12 DIAGNOSIS — I10 BENIGN ESSENTIAL HYPERTENSION: ICD-10-CM

## 2021-07-12 DIAGNOSIS — D50.9 IRON DEFICIENCY ANEMIA, UNSPECIFIED IRON DEFICIENCY ANEMIA TYPE: ICD-10-CM

## 2021-07-12 DIAGNOSIS — C50.919 MALIGNANT NEOPLASM OF FEMALE BREAST, UNSPECIFIED ESTROGEN RECEPTOR STATUS, UNSPECIFIED LATERALITY, UNSPECIFIED SITE OF BREAST (H): ICD-10-CM

## 2021-07-12 DIAGNOSIS — M81.0 AGE-RELATED OSTEOPOROSIS WITHOUT CURRENT PATHOLOGICAL FRACTURE: ICD-10-CM

## 2021-07-12 PROCEDURE — G0439 PPPS, SUBSEQ VISIT: HCPCS | Performed by: INTERNAL MEDICINE

## 2021-07-12 RX ORDER — IRBESARTAN AND HYDROCHLOROTHIAZIDE 150; 12.5 MG/1; MG/1
1 TABLET, FILM COATED ORAL DAILY
Qty: 90 TABLET | Refills: 3 | Status: SHIPPED | OUTPATIENT
Start: 2021-07-12 | End: 2022-06-24

## 2021-07-12 RX ORDER — SIMVASTATIN 20 MG
TABLET ORAL
Qty: 90 TABLET | Refills: 3 | Status: SHIPPED | OUTPATIENT
Start: 2021-07-12 | End: 2022-06-24

## 2021-07-12 ASSESSMENT — MIFFLIN-ST. JEOR: SCORE: 1087.72

## 2021-07-12 ASSESSMENT — ACTIVITIES OF DAILY LIVING (ADL): CURRENT_FUNCTION: NO ASSISTANCE NEEDED

## 2021-07-12 NOTE — PROGRESS NOTES
SUBJECTIVE:   Aysha Rose is a 85 year old female who presents for Preventive Visit.    She is doing quite well and works out.  She will be going to [facility] next week and she does the breast exam and mammogram there.  She continues to use Prolia.  She also has the left flank discomfort.  It is improved.  As noted, a CT was done and I reviewed this and it was entirely normal.  No bone lesions.  It is better.  It comes and goes.  She otherwise feels fine.  She was having constipation so she stopped her iron and this is improved.               Past Medical History:      Past Medical History:   Diagnosis Date     Abdominal pain 09/2013    ct abd and pelvis neg     Benign essential hypertension     nl mr renogram     Breast cancer (H) 2007    lumpectomy and xrt [facility], got 5 years of arimidex     Erosive gastritis 10/2011    by egd, colon as above, also small ulcer     High cholesterol      Hx of colonoscopy 2007, 2011    nl, 2011 with cecal angioect and 2mm polyp     [place] (iron deficiency anaemia) 2011, 2016 2011 colonoscopy cecal angioectasia, egd with hh, small ulcer     Left flank pain 05/2021    ct abd and pelvis nl     Microscopic hematuria 2012    Dr. Alonzo, ct done 11/28/12 negative, cysto neg     Osteopenia 2008    [facility], fu 2014 [facility] and worse -1.5 left hip; fu here 7/18 a bit worse     Osteoporosis 2019    based on low impact fx of left shoulder, added prolia then     Syncope 2011    neg est echo             Past Surgical History:      Past Surgical History:   Procedure Laterality Date     CATARACT IOL, RT/LT       LUMPECTOMY BREAST  2007     XR SHOULDER SURGERY WENDY LEFT Left 03/2019    tco             Social History:     Social History     Socioeconomic History     Marital status:      Spouse name: Not on file     Number of children: 4     Years of education: Not on file     Highest education level: Not on file   Occupational History     Occupation: homemaker   Tobacco Use     Smoking status:  Never Smoker     Smokeless tobacco: Never Used   Substance and Sexual Activity     Alcohol use: No     Drug use: No     Sexual activity: Yes     Partners: Male   Other Topics Concern     Parent/sibling w/ CABG, MI or angioplasty before 65F 55M? Not Asked   Social History Narrative     Not on file     Social Determinants of Health     Financial Resource Strain:      Difficulty of Paying Living Expenses:    Food Insecurity:      Worried About Running Out of Food in the Last Year:      Ran Out of Food in the Last Year:    Transportation Needs:      Lack of Transportation (Medical):      Lack of Transportation (Non-Medical):    Physical Activity:      Days of Exercise per Week:      Minutes of Exercise per Session:    Stress:      Feeling of Stress :    Social Connections:      Frequency of Communication with Friends and Family:      Frequency of Social Gatherings with Friends and Family:      Attends Taoist Services:      Active Member of Clubs or Organizations:      Attends Club or Organization Meetings:      Marital Status:    Intimate Partner Violence:      Fear of Current or Ex-Partner:      Emotionally Abused:      Physically Abused:      Sexually Abused:              Family History:   reviewed         Allergies:     Allergies   Allergen Reactions     Pcn [Penicillins]              Medications:     Current Outpatient Medications   Medication Sig Dispense Refill     acetaminophen (TYLENOL) 500 MG tablet Take 1,000 mg by mouth 4 times daily as needed for mild pain       amLODIPine (NORVASC) 2.5 MG tablet Take 1 tablet (2.5 mg) by mouth daily 90 tablet 3     calcium-vitamin D (CALCIUM 600 + D) 600-400 MG-UNIT per tablet Take 2 tablets by mouth every evening        cephALEXin (KEFLEX) 500 MG capsule TK 4 CS PO 30 TO 60 MINUTES PRIOR TO DENTAL WORK  2     denosumab (PROLIA) 60 MG/ML SOSY injection Inject 1 mL (60 mg) Subcutaneous every 6 months  0     denosumab (PROLIA) 60 MG/ML SOSY injection Inject 1 mL (60 mg)  "Subcutaneous every 6 months 1 mL 1     ferrous sulfate ER (SLO-FE) 142 (45 Fe) MG CR tablet Take 142 mg by mouth every other day       irbesartan-hydrochlorothiazide (AVALIDE) 150-12.5 MG tablet Take 1 tablet by mouth daily 90 tablet 3     pantoprazole (PROTONIX) 40 MG EC tablet TAKE 1 TABLET(40 MG) BY MOUTH DAILY 90 tablet 3     polyethylene glycol (MIRALAX/GLYCOLAX) powder Take 1 capful by mouth daily as needed (While taking Norco)       Psyllium (METAMUCIL FREE & NATURAL PO) Take 3 Tablespoonful by mouth every evening       simvastatin (ZOCOR) 20 MG tablet TAKE 1 TABLET(20 MG) BY MOUTH EVERY EVENING 90 tablet 3     vitamin C (ASCORBIC ACID) 500 MG tablet Take 500 mg by mouth every other day                 Review of Systems:   The 10 point Review of Systems is negative other than noted in the HPI           Physical Exam:   Blood pressure 132/83, pulse 71, temperature 97  F (36.1  C), temperature source Oral, height 1.626 m (5' 4\"), weight 65.8 kg (145 lb), SpO2 98 %, not currently breastfeeding.    Exam:  Constitutional: healthy appearing, alert and in no distress  Heent: Normocephalic. Head without obvious masses or lesions. PERRLDC, EOMI. Mouth exam within normal limits: tongue, mucous membranes, posterior pharynx all normal, no lesions or abnormalities seen.  Tm's and canals within normal limits bilaterally. Neck supple, no nuchal rigidity or masses. No supraclavicular, or cervical adenopathy. Thyroid symmetric, no masses.  Cardiovascular: Regular rate and rhythm, no murmer, rub or gallops.  JVP not elevated, no edema.  Carotids within normal limits bilaterally, no bruits.  Respiratory: Normal respiratory effort.  Lungs clear, normal flow, no wheezing or crackles.  Gastrointestinal: Normal active bowel sounds.   Soft, not tender, no masses, guarding or rebound.  No hepatosplenomegaly.   Musculoskeletal: extremities normal, no gross deformities noted.  Skin: no suspicious lesions or rashes   Neurologic: Mental " "status within normal limits.  Speech fluent.  No gross motor abnormalities and gait intact.  Psychiatric: mentation appears normal and affect normal.         Data:   Labs reviewed with patient         Assessment:   1. Normal complete physical exam  2. Consuelo, no signs malig cause  3. Breast ca, les, follow up [place]  4. Elevated cholesterol on statin  5. Hypertension, controlled  6. Flank pain, neg exam, suspect msk vs constip  7. Osteopor, prolia  8. hcm         Plan:   Up to date immunizations  Exercise, diet  Call if change in symptoms      Agustin Peguero M.D.                Patient has been advised of split billing requirements and indicates understanding: Yes   Are you in the first 12 months of your Medicare coverage?  No    Healthy Habits:    In general, how would you rate your overall health?  Very good    Frequency of exercise:  6-7 days/week    Duration of exercise:  45-60 minutes    Do you usually eat at least 4 servings of fruit and vegetables a day, include whole grains    & fiber and avoid regularly eating high fat or \"junk\" foods?  Yes    Taking medications regularly:  Yes    Barriers to taking medications:  None    Medication side effects:  None    Ability to successfully perform activities of daily living:  No assistance needed    Home Safety:  No safety concerns identified    Hearing Impairment:  No hearing concerns    In the past 6 months, have you been bothered by leaking of urine?  No    In general, how would you rate your overall mental or emotional health?  Very good      PHQ-2 Total Score:    Additional concerns today:  No    Do you feel safe in your environment?     Have you ever done Advance Care Planning? (For example, a Health Directive, POLST, or a discussion with a medical provider or your loved ones about your wishes): Yes, advance care planning is on file.       Fall risk       Cognitive Screening   1) Repeat 3 items (Leader, Season, Table)    2) Clock draw: NORMAL  3) 3 item recall: Recalls " "3 objects  Results: 3 items recalled: COGNITIVE IMPAIRMENT LESS LIKELY    Mini-CogTM Copyright MANAV Carty. Licensed by the author for use in [facility]; reprinted with permission (horace@[facility]). All rights reserved.      Do you have sleep apnea, excessive snoring or daytime drowsiness?: no    Reviewed and updated as needed this visit by clinical staff                 Reviewed and updated as needed this visit by Provider                Social History     Tobacco Use     Smoking status: Never Smoker     Smokeless tobacco: Never Used   Substance Use Topics     Alcohol use: No     If you drink alcohol do you typically have >3 drinks per day or >7 drinks per week? No    Alcohol Use 7/5/2019   Prescreen: >3 drinks/day or >7 drinks/week? No               Current providers sharing in care for this patient include:   Patient Care Team:  Agustin Peguero MD as PCP - General (Internal Medicine)  Agustin Peguero MD as Assigned PCP    The following health maintenance items are reviewed in Epic and correct as of today:  Health Maintenance Due   Topic Date Due     ANNUAL REVIEW OF HM ORDERS  Never done     MEDICARE ANNUAL WELLNESS VISIT  07/10/2021             Pertinent mammograms are reviewed under the imaging tab.    Review of Systems      OBJECTIVE:   There were no vitals taken for this visit. Estimated body mass index is 25.58 kg/m  as calculated from the following:    Height as of 5/6/21: 1.626 m (5' 4\").    Weight as of 5/6/21: 67.6 kg (149 lb).  Physical Exam          ASSESSMENT / PLAN:       Patient has been advised of split billing requirements and indicates understanding: No  COUNSELING:  Reviewed preventive health counseling, as reflected in patient instructions       Regular exercise       Healthy diet/nutrition    Estimated body mass index is 25.58 kg/m  as calculated from the following:    Height as of 5/6/21: 1.626 m (5' 4\").    Weight as of 5/6/21: 67.6 kg (149 lb).        She reports that she " has never smoked. She has never used smokeless tobacco.      Appropriate preventive services were discussed with this patient, including applicable screening as appropriate for cardiovascular disease, diabetes, osteopenia/osteoporosis, and glaucoma.  As appropriate for age/gender, discussed screening for colorectal cancer, prostate cancer, breast cancer, and cervical cancer. Checklist reviewing preventive services available has been given to the patient.    Reviewed patients plan of care and provided an AVS. The Basic Care Plan (routine screening as documented in Health Maintenance) for Aysha meets the Care Plan requirement. This Care Plan has been established and reviewed with the Patient.    Counseling Resources:  ATP IV Guidelines  Pooled Cohorts Equation Calculator  Breast Cancer Risk Calculator  Breast Cancer: Medication to Reduce Risk  FRAX Risk Assessment  ICSI Preventive Guidelines  Dietary Guidelines for Americans, 2010  USDA's MyPlate  ASA Prophylaxis  Lung CA Screening    Agustin Peguero MD  [facility]    Identified Health Risks:

## 2021-07-30 ENCOUNTER — ALLIED HEALTH/NURSE VISIT (OUTPATIENT)
Dept: NURSING | Facility: CLINIC | Age: 86
End: 2021-07-30
Payer: MEDICARE

## 2021-07-30 ENCOUNTER — TELEPHONE (OUTPATIENT)
Dept: FAMILY MEDICINE | Facility: CLINIC | Age: 86
End: 2021-07-30

## 2021-07-30 DIAGNOSIS — M81.0 AGE-RELATED OSTEOPOROSIS WITHOUT CURRENT PATHOLOGICAL FRACTURE: Primary | ICD-10-CM

## 2021-07-30 DIAGNOSIS — C50.919 MALIGNANT NEOPLASM OF FEMALE BREAST, UNSPECIFIED ESTROGEN RECEPTOR STATUS, UNSPECIFIED LATERALITY, UNSPECIFIED SITE OF BREAST (H): ICD-10-CM

## 2021-07-30 DIAGNOSIS — M81.0 AGE-RELATED OSTEOPOROSIS WITHOUT CURRENT PATHOLOGICAL FRACTURE: ICD-10-CM

## 2021-07-30 DIAGNOSIS — Z92.29 PERSONAL HISTORY OF OTHER DRUG THERAPY: Primary | ICD-10-CM

## 2021-07-30 PROCEDURE — 99207 PR NO CHARGE NURSE ONLY: CPT

## 2021-07-30 PROCEDURE — 96372 THER/PROPH/DIAG INJ SC/IM: CPT | Performed by: INTERNAL MEDICINE

## 2021-07-30 NOTE — TELEPHONE ENCOUNTER
Dr. Peguero, we need secondary diagnosis. Please see note below.     Pat Triage team   [facility]     [facility] Triage  [facility],       This patient is scheduled for prolia.  With their insurance coverage, it follows Medicare's NGS policy.  Currently we have the DX M81.0 but this request will need an additional diagnosis to support why this patient is not appropriate for other therapies.  Below is a list of secondary diagnosis that would follow Medicare's policy.     Z92.29 - Personal history of other drug therapy       T50.995S - Adverse effect of other drugs, medicaments and biological substances, sequela   T88.7XXS - Unspecified adverse effect of drug or medicament, sequela   Z88.8 - Allergy status to other drugs, medicaments and biological substances status     N18.30 Chronic kidney disease, stage 3 unspecified   N18.31 Chronic kidney disease, stage 3a   N18.32 Chronic kidney disease, stage 3b   N18.4 Chronic kidney disease, stage 4 (severe)   N18.5 Chronic kidney disease, stage 5     Would an additional diagnosis code apply to this patient? If so, can you please have this added to the CAM order as the secondary diagnosis?  Please let me know when this is completed and I can proceed with verifying patient's benefit for this medication.     Thank you,     Berto Mason

## 2021-07-30 NOTE — PROGRESS NOTES
Clinic Administered Medication Documentation    Administrations This Visit     denosumab (PROLIA) injection 60 mg     Admin Date  07/30/2021 Action  Given Dose  60 mg Route  Subcutaneous Site  Right Arm Administered By  Derek Olivera RN    Ordering Provider: Agustin Peguero MD    NDC: 46518-312-31    Lot#: [id number]    : AMGEN    Patient Supplied?: No                  Prolia Documentation    Prior to injection, verified patient identity using patient's name and date of birth. Medication was administered. Please see MAR and medication order for additional information. Patient instructed to remain in clinic for 15 minutes.    Indication: Prolia  (denosumab) is a prescription medicine used to treat osteoporosis in patients who:     Are at high risk for fracture, meaning patients who have had a fracture related to osteoporosis, or who have multiple risk factors for fracture.    Cannot use another osteoporosis medicine or other osteoporosis medicines did not work well.    The timeline for early/late injections would be 4 weeks early and any time after the 6 month maria dolores. If a patient receives their injection late, then the subsequent injection would be 6 months from the date that they actually received the injection.    When was the last injection?  02/11/2021  Was the last injection at least 6 months ago? Yes  Has the prior authorization been completed?  Yes  Is there an active order (within the past 365 days) in the chart?  Yes  Patient denied having dental work involving the bone in the past 6 months?  Yes  Patient denies a plan to dental work involving the bone in the next 6 months? Yes    The following steps were completed to comply with the REMS program for Prolia:    Reviewed information in the Medication Guide and Patient Counseling Chart, including the serious risks of Prolia  and the symptoms of each risk.    Advised patient to seek prompt medical attention if they have signs or symptoms of  any of the serious risks.    Provided each patient a copy of the Medication Guide and Patient Brochure.      Was entire vial of medication used? Yes  Vial/Syringe: Syringe  Expiration Date:  10/23  Was the medication not being billed by clinic? No

## 2021-08-06 NOTE — TELEPHONE ENCOUNTER
Berto Mason [facility] Triage  Hello,     I am following up on needing a second DX to meet Medicare guidelines for coverage of Prolia.  Please see message below and let me know when that has been completed and I can proceed with verifying benefits.     Thank you,     Berto Nielson       Please advise - is there a secondary dx that can be used from below list?     Maria Isabel TAYLOR RN

## 2021-08-09 NOTE — TELEPHONE ENCOUNTER
Has to be one of these dx or insurance won't cover:     Z92.29 - Personal history of other drug therapy       T50.995S - Adverse effect of other drugs, medicaments and biological substances, sequela   T88.7XXS - Unspecified adverse effect of drug or medicament, sequela   Z88.8 - Allergy status to other drugs, medicaments and biological substances status     N18.30 Chronic kidney disease, stage 3 unspecified   N18.31 Chronic kidney disease, stage 3a   N18.32 Chronic kidney disease, stage 3b   N18.4 Chronic kidney disease, stage 4 (severe)   N18.5 Chronic kidney disease, stage 5

## 2021-08-30 ENCOUNTER — OFFICE VISIT (OUTPATIENT)
Dept: FAMILY MEDICINE | Facility: CLINIC | Age: 86
End: 2021-08-30
Payer: MEDICARE

## 2021-08-30 VITALS
OXYGEN SATURATION: 96 % | WEIGHT: 128 LBS | RESPIRATION RATE: 18 BRPM | TEMPERATURE: 97.5 F | DIASTOLIC BLOOD PRESSURE: 74 MMHG | BODY MASS INDEX: 21.85 KG/M2 | SYSTOLIC BLOOD PRESSURE: 126 MMHG | HEART RATE: 85 BPM | HEIGHT: 64 IN

## 2021-08-30 DIAGNOSIS — H61.22 IMPACTED CERUMEN OF LEFT EAR: Primary | ICD-10-CM

## 2021-08-30 PROCEDURE — 69209 REMOVE IMPACTED EAR WAX UNI: CPT | Mod: LT | Performed by: NURSE PRACTITIONER

## 2021-08-30 ASSESSMENT — MIFFLIN-ST. JEOR: SCORE: 1010.6

## 2021-08-30 NOTE — PROGRESS NOTES
"    Assessment & Plan   Problem List Items Addressed This Visit     None      Visit Diagnoses     Impacted cerumen of left ear    -  Primary         CMA completed cerumen removal by lavage       MUNA Jones CNP  [facility] RON Cordova is a 85 year old who presents for the following health issues     HPI     Concern - Plugged Ears    For about two weeks, left ear has been plugged  Hard of hearing on left ear  No recent illness  Hx of  plugged ears with pain 10 years ago  No pain  No sinus symptoms    No other symptoms, just plugged ear    Review of Systems   Detailed as above         Objective    /74 (BP Location: Right arm, Patient Position: Chair, Cuff Size: Adult Regular)   Pulse 85   Temp 97.5  F (36.4  C) (Temporal)   Resp 18   Ht 1.626 m (5' 4\")   Wt 58.1 kg (128 lb)   SpO2 96%   BMI 21.97 kg/m    There is no height or weight on file to calculate BMI.  Physical Exam  HENT:      Right Ear: Tympanic membrane, ear canal and external ear normal.      Left Ear: There is impacted cerumen.   Neurological:      Mental Status: She is alert.   Psychiatric:         Behavior: Behavior normal.       I saw this patient in collaboration with Obdulia Mejia      I was present with the MUNA/PA student who participated in the service and in the documentation of the services provided. I have verified the history and personally performed the physical exam and medical decision making, as documented by the student and edited by me.     MUNA Goodman, NENITA Mejia NP Student     "

## 2021-08-30 NOTE — PROGRESS NOTES
Patient identified using two patient identifiers.  Ear exam showing wax occlusion completed by provider.  Solution: warm water was placed in the left ear(s) via Syringe.

## 2021-09-05 ENCOUNTER — HEALTH MAINTENANCE LETTER (OUTPATIENT)
Age: 86
End: 2021-09-05

## 2021-12-29 ENCOUNTER — NURSE TRIAGE (OUTPATIENT)
Dept: FAMILY MEDICINE | Facility: CLINIC | Age: 86
End: 2021-12-29
Payer: MEDICARE

## 2021-12-29 NOTE — TELEPHONE ENCOUNTER
Pt calling and has a few questions about the Prolia shot. Please call her back at [phone]. Thank you.  Jessica Hernandez,

## 2022-01-03 NOTE — TELEPHONE ENCOUNTER
Unless she is having issues I would continue the Prolia.  Prolia should not cause swallowing issues.  If she is having swallowing issues I need to know this.    Thank you    Agustin Peguero M.D.

## 2022-01-03 NOTE — TELEPHONE ENCOUNTER
Patient calling to ask if Prolia is still safe to take and should she schedule for next shot?      Patient says she has Hiatal Hernia that is not painful, she seems to swallow her food (around 5 times in last 6 months) when tense and feels food is getting stuck around diaphragm location. Airway is patent.

## 2022-01-05 NOTE — TELEPHONE ENCOUNTER
Pt calling and given Dr. Lind's message below.  states if feels like the food goes partially down or gets stuck.  She stops talking and relaxes and then the food goes down.  States it has happened 4 times in the past 6 months.  3 times it happened when with family dinner.  Pt thinks it is related when she eats to fast.  [facility].    Pt will call back to schedule prolia.    Becky MERRILL RN  EP Triage

## 2022-01-05 NOTE — TELEPHONE ENCOUNTER
Called patient, Left message to return call to Triage RN.    On callback review PCP message below and triage swallowing difficulties.    Parminder Norman RN  [facility]

## 2022-01-10 ENCOUNTER — MYC MEDICAL ADVICE (OUTPATIENT)
Dept: FAMILY MEDICINE | Facility: CLINIC | Age: 87
End: 2022-01-10
Payer: MEDICARE

## 2022-01-10 DIAGNOSIS — K29.60 EROSIVE GASTRITIS: Primary | ICD-10-CM

## 2022-01-11 RX ORDER — PANTOPRAZOLE SODIUM 40 MG/1
40 TABLET, DELAYED RELEASE ORAL DAILY
Qty: 90 TABLET | Refills: 3 | Status: SHIPPED | OUTPATIENT
Start: 2022-01-11 | End: 2022-08-12

## 2022-01-11 NOTE — TELEPHONE ENCOUNTER
"Routing as break in medication and mc about  GERD sx.  Answered pt with predicted \"Dr. Peguero answer\"  Please advise if changes to plan.   Jane Millan, RN  [facility] RN Triage Team    "

## 2022-02-01 ENCOUNTER — ALLIED HEALTH/NURSE VISIT (OUTPATIENT)
Dept: NURSING | Facility: CLINIC | Age: 87
End: 2022-02-01
Payer: MEDICARE

## 2022-02-01 DIAGNOSIS — M81.0 AGE-RELATED OSTEOPOROSIS WITHOUT CURRENT PATHOLOGICAL FRACTURE: Primary | ICD-10-CM

## 2022-02-01 PROCEDURE — 99207 PR NO CHARGE NURSE ONLY: CPT

## 2022-02-01 PROCEDURE — 96372 THER/PROPH/DIAG INJ SC/IM: CPT | Performed by: INTERNAL MEDICINE

## 2022-02-01 NOTE — PROGRESS NOTES
Clinic Administered Medication Documentation    Administrations This Visit     denosumab (PROLIA) injection 60 mg     Admin Date  02/01/2022 Action  Given Dose  60 mg Route  Subcutaneous Site  Right Arm Administered By  Traci Mulligan RN    Ordering Provider: Agustin Peguero MD    Patient Supplied?: No                  Prolia Documentation    Prior to injection, verified patient identity using patient's name and date of birth. Medication was administered. Please see MAR and medication order for additional information. Patient instructed to remain in clinic for 15 minutes and report any adverse reaction to staff immediately .    Indication: Prolia  (denosumab) is a prescription medicine used to treat osteoporosis in patients who:     Are at high risk for fracture, meaning patients who have had a fracture related to osteoporosis, or who have multiple risk factors for fracture.    Cannot use another osteoporosis medicine or other osteoporosis medicines did not work well.    The timeline for early/late injections would be 4 weeks early and any time after the 6 month maria dolores. If a patient receives their injection late, then the subsequent injection would be 6 months from the date that they actually received the injection.    When was the last injection?  7/30/2021  Was the last injection at least 6 months ago? Yes  Has the prior authorization been completed?  Yes  Is there an active order (within the past 365 days) in the chart?  Yes  Patient denied having dental work involving the bone in the past 6 months?  Yes  Patient denies a plan to dental work involving the bone in the next 6 months? Yes    The following steps were completed to comply with the REMS program for Prolia:    Reviewed information in the Medication Guide and Patient Counseling Chart, including the serious risks of Prolia  and the symptoms of each risk.    Advised patient to seek prompt medical attention if they have signs or symptoms of any of the  serious risks.    Provided each patient a copy of the Medication Guide and Patient Brochure.      Was entire vial of medication used? Yes  Vial/Syringe: Syringe  Expiration Date:  05/2024  Was the medication not being billed by clinic? No     Traci Mulligan RN

## 2022-02-16 ENCOUNTER — TRANSFERRED RECORDS (OUTPATIENT)
Dept: HEALTH INFORMATION MANAGEMENT | Facility: CLINIC | Age: 87
End: 2022-02-16
Payer: MEDICARE

## 2022-03-14 DIAGNOSIS — I10 ESSENTIAL HYPERTENSION WITH GOAL BLOOD PRESSURE LESS THAN 140/90: ICD-10-CM

## 2022-03-15 RX ORDER — AMLODIPINE BESYLATE 2.5 MG/1
TABLET ORAL
Qty: 90 TABLET | Refills: 0 | Status: SHIPPED | OUTPATIENT
Start: 2022-03-15 | End: 2022-06-09

## 2022-03-15 NOTE — TELEPHONE ENCOUNTER
Routing refill request to provider for review/approval because:  Drug interaction warning    Last office vist:  8/30/2021    Pended 90 day supply & 0 refills. Please Review.    Next office visit: none    Parminder Norman RN  [facility]

## 2022-04-25 ENCOUNTER — TRANSFERRED RECORDS (OUTPATIENT)
Dept: HEALTH INFORMATION MANAGEMENT | Facility: CLINIC | Age: 87
End: 2022-04-25
Payer: MEDICARE

## 2022-06-08 DIAGNOSIS — I10 ESSENTIAL HYPERTENSION WITH GOAL BLOOD PRESSURE LESS THAN 140/90: ICD-10-CM

## 2022-06-09 RX ORDER — AMLODIPINE BESYLATE 2.5 MG/1
TABLET ORAL
Qty: 90 TABLET | Refills: 0 | Status: SHIPPED | OUTPATIENT
Start: 2022-06-09 | End: 2022-08-12

## 2022-06-09 NOTE — TELEPHONE ENCOUNTER
Prescription approved per [facility] Refill Protocol.  Arben Stapleton RN  [facility] Triage Nurse

## 2022-06-23 DIAGNOSIS — I10 ESSENTIAL HYPERTENSION WITH GOAL BLOOD PRESSURE LESS THAN 140/90: ICD-10-CM

## 2022-06-23 DIAGNOSIS — E78.5 HYPERLIPIDEMIA LDL GOAL <130: ICD-10-CM

## 2022-06-23 NOTE — TELEPHONE ENCOUNTER
Advance refill request from pharmacy       Last office visit: 7/12/2021 with prescribing provider:  Marielena   Future Office Visit:   Next 5 appointments (look out 90 days)    Aug 12, 2022  7:30 AM  (Arrive by 7:10 AM)  Annual Wellness Visit with Agustin Peguero MD  [facility] ([facility] [address] [facility], [address]  [place] [id number] [phone]

## 2022-06-24 RX ORDER — IRBESARTAN AND HYDROCHLOROTHIAZIDE 150; 12.5 MG/1; MG/1
1 TABLET, FILM COATED ORAL DAILY
Qty: 90 TABLET | Refills: 0 | Status: SHIPPED | OUTPATIENT
Start: 2022-06-24 | End: 2022-08-12

## 2022-06-24 RX ORDER — SIMVASTATIN 20 MG
TABLET ORAL
Qty: 90 TABLET | Refills: 0 | Status: SHIPPED | OUTPATIENT
Start: 2022-06-24 | End: 2022-08-12

## 2022-06-24 NOTE — TELEPHONE ENCOUNTER
Avalide approved per [facility] Refill Protocol.    Simvastatin - Routing refill request to provider for review/approval because: drug interaction warning     Medium    Drug-Drug: amLODIPine and simvastatin  Plasma concentrations and pharmacologic effects of simvastatin may be increased by amlodipine.        Josselin Pisano RN  [facility]

## 2022-08-01 ENCOUNTER — LAB (OUTPATIENT)
Dept: LAB | Facility: CLINIC | Age: 87
End: 2022-08-01
Payer: MEDICARE

## 2022-08-01 DIAGNOSIS — D50.9 IRON DEFICIENCY ANEMIA, UNSPECIFIED IRON DEFICIENCY ANEMIA TYPE: ICD-10-CM

## 2022-08-01 DIAGNOSIS — Z00.00 MEDICARE ANNUAL WELLNESS VISIT, SUBSEQUENT: ICD-10-CM

## 2022-08-01 DIAGNOSIS — C50.919 MALIGNANT NEOPLASM OF FEMALE BREAST, UNSPECIFIED ESTROGEN RECEPTOR STATUS, UNSPECIFIED LATERALITY, UNSPECIFIED SITE OF BREAST (H): ICD-10-CM

## 2022-08-01 DIAGNOSIS — E78.5 HYPERLIPIDEMIA LDL GOAL <130: ICD-10-CM

## 2022-08-01 DIAGNOSIS — I10 BENIGN ESSENTIAL HYPERTENSION: ICD-10-CM

## 2022-08-01 LAB
ALBUMIN SERPL-MCNC: 3.6 G/DL (ref 3.4–5)
ALP SERPL-CCNC: 39 U/L (ref 40–150)
ALT SERPL W P-5'-P-CCNC: 21 U/L (ref 0–50)
ANION GAP SERPL CALCULATED.3IONS-SCNC: 6 MMOL/L (ref 3–14)
AST SERPL W P-5'-P-CCNC: 18 U/L (ref 0–45)
BILIRUB SERPL-MCNC: 0.4 MG/DL (ref 0.2–1.3)
BUN SERPL-MCNC: 13 MG/DL (ref 7–30)
CALCIUM SERPL-MCNC: 10 MG/DL (ref 8.5–10.1)
CHLORIDE BLD-SCNC: 105 MMOL/L (ref 94–109)
CHOLEST SERPL-MCNC: 181 MG/DL
CO2 SERPL-SCNC: 31 MMOL/L (ref 20–32)
CREAT SERPL-MCNC: 0.68 MG/DL (ref 0.52–1.04)
ERYTHROCYTE [DISTWIDTH] IN BLOOD BY AUTOMATED COUNT: 12.8 % (ref 10–15)
FASTING STATUS PATIENT QL REPORTED: YES
FERRITIN SERPL-MCNC: 54 NG/ML (ref 8–252)
GFR SERPL CREATININE-BSD FRML MDRD: 84 ML/MIN/1.73M2
GLUCOSE BLD-MCNC: 95 MG/DL (ref 70–99)
HCT VFR BLD AUTO: 40.9 % (ref 35–47)
HDLC SERPL-MCNC: 63 MG/DL
HGB BLD-MCNC: 13.3 G/DL (ref 11.7–15.7)
IRON SATN MFR SERPL: 21 % (ref 15–46)
IRON SERPL-MCNC: 68 UG/DL (ref 35–180)
LDLC SERPL CALC-MCNC: 102 MG/DL
MCH RBC QN AUTO: 31 PG (ref 26.5–33)
MCHC RBC AUTO-ENTMCNC: 32.5 G/DL (ref 31.5–36.5)
MCV RBC AUTO: 95 FL (ref 78–100)
NONHDLC SERPL-MCNC: 118 MG/DL
PLATELET # BLD AUTO: 228 10E3/UL (ref 150–450)
POTASSIUM BLD-SCNC: 4 MMOL/L (ref 3.4–5.3)
PROT SERPL-MCNC: 6.7 G/DL (ref 6.8–8.8)
RBC # BLD AUTO: 4.29 10E6/UL (ref 3.8–5.2)
SODIUM SERPL-SCNC: 142 MMOL/L (ref 133–144)
TIBC SERPL-MCNC: 329 UG/DL (ref 240–430)
TRIGL SERPL-MCNC: 79 MG/DL
WBC # BLD AUTO: 4.9 10E3/UL (ref 4–11)

## 2022-08-01 PROCEDURE — 80053 COMPREHEN METABOLIC PANEL: CPT

## 2022-08-01 PROCEDURE — 80061 LIPID PANEL: CPT

## 2022-08-01 PROCEDURE — 36415 COLL VENOUS BLD VENIPUNCTURE: CPT

## 2022-08-01 PROCEDURE — 83550 IRON BINDING TEST: CPT

## 2022-08-01 PROCEDURE — 85027 COMPLETE CBC AUTOMATED: CPT

## 2022-08-01 PROCEDURE — 82728 ASSAY OF FERRITIN: CPT

## 2022-08-12 ENCOUNTER — OFFICE VISIT (OUTPATIENT)
Dept: FAMILY MEDICINE | Facility: CLINIC | Age: 87
End: 2022-08-12
Payer: MEDICARE

## 2022-08-12 VITALS
WEIGHT: 138 LBS | HEART RATE: 79 BPM | DIASTOLIC BLOOD PRESSURE: 82 MMHG | SYSTOLIC BLOOD PRESSURE: 139 MMHG | RESPIRATION RATE: 16 BRPM | TEMPERATURE: 98.2 F | BODY MASS INDEX: 23.56 KG/M2 | OXYGEN SATURATION: 96 % | HEIGHT: 64 IN

## 2022-08-12 DIAGNOSIS — D50.9 IRON DEFICIENCY ANEMIA, UNSPECIFIED IRON DEFICIENCY ANEMIA TYPE: ICD-10-CM

## 2022-08-12 DIAGNOSIS — I10 BENIGN ESSENTIAL HYPERTENSION: ICD-10-CM

## 2022-08-12 DIAGNOSIS — I10 ESSENTIAL HYPERTENSION WITH GOAL BLOOD PRESSURE LESS THAN 140/90: ICD-10-CM

## 2022-08-12 DIAGNOSIS — K29.60 EROSIVE GASTRITIS: ICD-10-CM

## 2022-08-12 DIAGNOSIS — E78.5 HYPERLIPIDEMIA LDL GOAL <130: ICD-10-CM

## 2022-08-12 DIAGNOSIS — Z23 NEED FOR VACCINATION: ICD-10-CM

## 2022-08-12 DIAGNOSIS — M81.0 AGE-RELATED OSTEOPOROSIS WITHOUT CURRENT PATHOLOGICAL FRACTURE: ICD-10-CM

## 2022-08-12 DIAGNOSIS — Z00.00 ENCOUNTER FOR MEDICARE ANNUAL WELLNESS EXAM: Primary | ICD-10-CM

## 2022-08-12 DIAGNOSIS — C50.919 MALIGNANT NEOPLASM OF FEMALE BREAST, UNSPECIFIED ESTROGEN RECEPTOR STATUS, UNSPECIFIED LATERALITY, UNSPECIFIED SITE OF BREAST (H): ICD-10-CM

## 2022-08-12 PROCEDURE — 99397 PER PM REEVAL EST PAT 65+ YR: CPT | Mod: 25 | Performed by: INTERNAL MEDICINE

## 2022-08-12 PROCEDURE — 90471 IMMUNIZATION ADMIN: CPT | Performed by: INTERNAL MEDICINE

## 2022-08-12 PROCEDURE — 90714 TD VACC NO PRESV 7 YRS+ IM: CPT | Performed by: INTERNAL MEDICINE

## 2022-08-12 RX ORDER — PANTOPRAZOLE SODIUM 40 MG/1
40 TABLET, DELAYED RELEASE ORAL DAILY
Qty: 90 TABLET | Refills: 3 | Status: SHIPPED | OUTPATIENT
Start: 2022-08-12

## 2022-08-12 RX ORDER — SIMVASTATIN 20 MG
TABLET ORAL
Qty: 90 TABLET | Refills: 3 | Status: SHIPPED | OUTPATIENT
Start: 2022-08-12 | End: 2023-02-07

## 2022-08-12 RX ORDER — IRBESARTAN AND HYDROCHLOROTHIAZIDE 150; 12.5 MG/1; MG/1
1 TABLET, FILM COATED ORAL DAILY
Qty: 90 TABLET | Refills: 3 | Status: SHIPPED | OUTPATIENT
Start: 2022-08-12 | End: 2023-08-29

## 2022-08-12 RX ORDER — CHLORAL HYDRATE 500 MG
2 CAPSULE ORAL DAILY
COMMUNITY

## 2022-08-12 RX ORDER — AMLODIPINE BESYLATE 2.5 MG/1
2.5 TABLET ORAL DAILY
Qty: 90 TABLET | Refills: 3 | Status: SHIPPED | OUTPATIENT
Start: 2022-08-12 | End: 2023-08-29

## 2022-08-12 ASSESSMENT — ENCOUNTER SYMPTOMS
FEVER: 0
DIARRHEA: 0
JOINT SWELLING: 0
DIZZINESS: 0
SORE THROAT: 0
DYSURIA: 0
SHORTNESS OF BREATH: 0
NERVOUS/ANXIOUS: 0
HEMATURIA: 0
ABDOMINAL PAIN: 0
WEAKNESS: 0
PALPITATIONS: 0
MYALGIAS: 0
ARTHRALGIAS: 0
CHILLS: 0
HEADACHES: 0
CONSTIPATION: 1
EYE PAIN: 0
NAUSEA: 0
COUGH: 0
BREAST MASS: 0
HEARTBURN: 0
PARESTHESIAS: 0
HEMATOCHEZIA: 0
FREQUENCY: 0

## 2022-08-12 ASSESSMENT — ACTIVITIES OF DAILY LIVING (ADL): CURRENT_FUNCTION: NO ASSISTANCE NEEDED

## 2022-08-12 NOTE — PATIENT INSTRUCTIONS
Patient Education   Personalized Prevention Plan  You are due for the preventive services outlined below.  Your care team is available to assist you in scheduling these services.  If you have already completed any of these items, please share that information with your care team to update in your medical record.  Health Maintenance Due   Topic Date Due     ANNUAL REVIEW OF HM ORDERS  Never done     PHQ-2 (once per calendar year)  01/01/2022     FALL RISK ASSESSMENT  07/12/2022

## 2022-08-12 NOTE — PROGRESS NOTES
SUBJECTIVE:   Aysha Rose is a 86 year old female who presents for Preventive Visit.    She is doing very well and exercises regularly.  She recently was at [place] and had an updated breast exam.  She is not taking iron and has not for a year.  She is due for a Prolia shot and will get that.  She otherwise feels fine.               Past Medical History:      Past Medical History:   Diagnosis Date     Abdominal pain 09/2013    ct abd and pelvis neg     Benign essential hypertension     nl mr renogram     Breast cancer (H) 2007    lumpectomy and xrt [place], got 5 years of arimidex     Erosive gastritis 10/2011    by egd, colon as above, also small ulcer     High cholesterol      Hx of colonoscopy 2007, 2011    nl, 2011 with cecal angioect and 2mm polyp     [place] (iron deficiency anaemia) 2011, 2016 2011 colonoscopy cecal angioectasia, egd with hh, small ulcer     Left flank pain 05/2021    ct abd and pelvis nl     Microscopic hematuria 2012    Dr. Alonzo, ct done 11/28/12 negative, cysto neg     Osteopenia 2008    [place], fu 2014 [place] [facility] -1.5 left hip; fu here 7/18 a bit worse     Osteoporosis 2019    based on low impact fx of left shoulder, added prolia then     Syncope 2011    neg est echo             Past Surgical History:      Past Surgical History:   Procedure Laterality Date     CATARACT IOL, RT/LT       LUMPECTOMY BREAST  2007     XR SHOULDER SURGERY WENDY LEFT Left 03/2019    tco             Social History:     Social History     Socioeconomic History     Marital status:      Spouse name: Not on file     Number of children: 4     Years of education: Not on file     Highest education level: Not on file   Occupational History     Occupation: homemaker   Tobacco Use     Smoking status: Never Smoker     Smokeless tobacco: Never Used   Substance and Sexual Activity     Alcohol use: No     Drug use: No     Sexual activity: Yes     Partners: Male   Other Topics Concern     Parent/sibling w/ CABG, MI or  angioplasty before 65F 55M? Not Asked   Social History Narrative     Not on file     Social Determinants of Health     Financial Resource Strain: Not on file   Food Insecurity: Not on file   Transportation Needs: Not on file   Physical Activity: Not on file   Stress: Not on file   Social Connections: Not on file   Intimate Partner Violence: Not on file   Housing Stability: Not on file             Family History:   reviewed         Allergies:     Allergies   Allergen Reactions     Pcn [Penicillins]              Medications:     Current Outpatient Medications   Medication Sig Dispense Refill     acetaminophen (TYLENOL) 500 MG tablet Take 1,000 mg by mouth 4 times daily as needed for mild pain       amLODIPine (NORVASC) 2.5 MG tablet Take 1 tablet (2.5 mg) by mouth daily 90 tablet 3     calcium carbonate 600 mg-vitamin D 400 units (CALTRATE) 600-400 MG-UNIT per tablet Take 2 tablets by mouth every evening        cephALEXin (KEFLEX) 500 MG capsule TK 4 CS PO 30 TO 60 MINUTES PRIOR TO DENTAL WORK  2     denosumab (PROLIA) 60 MG/ML SOSY injection Inject 1 mL (60 mg) Subcutaneous every 6 months  0     denosumab (PROLIA) 60 MG/ML SOSY injection Inject 1 mL (60 mg) Subcutaneous every 6 months 1 mL 1     fish oil-omega-3 fatty acids 1000 MG capsule Take 2 g by mouth daily       irbesartan-hydrochlorothiazide (AVALIDE) 150-12.5 MG tablet Take 1 tablet by mouth daily 90 tablet 3     pantoprazole (PROTONIX) 40 MG EC tablet Take 1 tablet (40 mg) by mouth daily 90 tablet 3     polyethylene glycol (MIRALAX/GLYCOLAX) powder Take 1 capful by mouth daily as needed (While taking Norco)       Psyllium (METAMUCIL FREE & NATURAL PO) Take 3 Tablespoonful by mouth every evening       simvastatin (ZOCOR) 20 MG tablet TAKE 1 TABLET(20 MG) BY MOUTH EVERY EVENING 90 tablet 3     vitamin C (ASCORBIC ACID) 500 MG tablet Take 500 mg by mouth every other day                 Review of Systems:   The 10 point Review of Systems is negative other than  "noted in the HPI           Physical Exam:   Blood pressure 139/82, pulse 79, temperature 98.2  F (36.8  C), temperature source Tympanic, resp. rate 16, height 1.613 m (5' 3.5\"), weight 62.6 kg (138 lb), SpO2 96 %, not currently breastfeeding.    Exam:  Constitutional: healthy appearing, alert and in no distress  Heent: Normocephalic. Head without obvious masses or lesions. PERRLDC, EOMI. Mouth exam within normal limits: tongue, mucous membranes, posterior pharynx all normal, no lesions or abnormalities seen.  Tm's and canals within normal limits bilaterally. Neck supple, no nuchal rigidity or masses. No supraclavicular, or cervical adenopathy. Thyroid symmetric, no masses.  Cardiovascular: Regular rate and rhythm, no murmer, rub or gallops.  JVP not elevated, no edema.  Carotids within normal limits bilaterally, no bruits.  Respiratory: Normal respiratory effort.  Lungs clear, normal flow, no wheezing or crackles.  Gastrointestinal: Normal active bowel sounds.   Soft, not tender, no masses, guarding or rebound.  No hepatosplenomegaly.   Musculoskeletal: extremities normal, no gross deformities noted.  Skin: no suspicious lesions or rashes   Neurologic: Mental status within normal limits.  Speech fluent.  No gross motor abnormalities and gait intact.  Psychiatric: mentation appears normal and affect normal.         Data:   Labs reviewed with patient         Assessment:   1. Normal complete physical exam  2. Breast ca, les  3. Elevated cholesterol on statin  4. Hypertension, contnrolled  5. Osteropor, prolia  6. Consuelo, no signs malig cause  7. Erosive gastritis, prn ppi  8. hcm         Plan:   Td shot  Prolia, she will arrange  Exercise, diet      Agustin Peguero M.D.                Patient has been advised of split billing requirements and indicates understanding: Yes  Are you in the first 12 months of your Medicare coverage?  No    Healthy Habits:     In general, how would you rate your overall health?  Good    " "Frequency of exercise:  6-7 days/week    Duration of exercise:  30-45 minutes    Do you usually eat at least 4 servings of fruit and vegetables a day, include whole grains    & fiber and avoid regularly eating high fat or \"junk\" foods?  Yes    Taking medications regularly:  Yes    Medication side effects:  None    Ability to successfully perform activities of daily living:  No assistance needed    Home Safety:  Throw rugs in the hallway    Hearing Impairment:  No hearing concerns    In the past 6 months, have you been bothered by leaking of urine? Yes    In general, how would you rate your overall mental or emotional health?  Good      PHQ-2 Total Score: 0    Additional concerns today:  No    Do you feel safe in your environment? Yes    Have you ever done Advance Care Planning? (For example, a Health Directive, POLST, or a discussion with a medical provider or your loved ones about your wishes): Yes, advance care planning is on file.       Fall risk  Fallen 2 or more times in the past year?: No  Any fall with injury in the past year?: No    Cognitive Screening   1) Repeat 3 items (Leader, Season, Table)    2) Clock draw: NORMAL  3) 3 item recall: Recalls 3 objects  Results: 3 items recalled: COGNITIVE IMPAIRMENT LESS LIKELY    Mini-CogTM Copyright S [employer]. Licensed by the author for use in [facility]; reprinted with permission (soblossom@[facility]). All rights reserved.      Do you have sleep apnea, excessive snoring or daytime drowsiness?: no    Reviewed and updated as needed this visit by clinical staff                    Reviewed and updated as needed this visit by Provider                   Social History     Tobacco Use     Smoking status: Never Smoker     Smokeless tobacco: Never Used   Substance Use Topics     Alcohol use: No     If you drink alcohol do you typically have >3 drinks per day or >7 drinks per week? No    Alcohol Use 7/12/2021   Prescreen: >3 drinks/day or >7 drinks/week? No " "              Current providers sharing in care for this patient include:   Patient Care Team:  Agustni Peguero MD as PCP - General (Internal Medicine)  Agustin Peguero MD as Assigned PCP    The following health maintenance items are reviewed in Epic and correct as of today:  Health Maintenance Due   Topic Date Due     ANNUAL REVIEW OF HM ORDERS  Never done     PHQ-2 (once per calendar year)  01/01/2022     MEDICARE ANNUAL WELLNESS VISIT  07/12/2022     FALL RISK ASSESSMENT  07/12/2022             Pertinent mammograms are reviewed under the imaging tab.    Review of Systems   Constitutional: Negative for chills and fever.   HENT: Negative for congestion, ear pain, hearing loss and sore throat.    Eyes: Negative for pain and visual disturbance.   Respiratory: Negative for cough and shortness of breath.    Cardiovascular: Negative for chest pain, palpitations and peripheral edema.   Gastrointestinal: Positive for constipation. Negative for abdominal pain, diarrhea, heartburn, hematochezia and nausea.   Breasts:  Negative for breast mass and discharge.   Genitourinary: Negative for dysuria, frequency, genital sores, hematuria, pelvic pain, urgency, vaginal bleeding and vaginal discharge.   Musculoskeletal: Negative for arthralgias, joint swelling and myalgias.   Skin: Negative for rash.   Neurological: Negative for dizziness, weakness, headaches and paresthesias.   Psychiatric/Behavioral: Negative for mood changes. The patient is not nervous/anxious.          OBJECTIVE:   There were no vitals taken for this visit. Estimated body mass index is 21.97 kg/m  as calculated from the following:    Height as of 8/30/21: 1.626 m (5' 4\").    Weight as of 8/30/21: 58.1 kg (128 lb).  Physical Exam          ASSESSMENT / PLAN:           COUNSELING:  Reviewed preventive health counseling, as reflected in patient instructions       Regular exercise       Healthy diet/nutrition    Estimated body mass index is 21.97 kg/m  as " "calculated from the following:    Height as of 8/30/21: 1.626 m (5' 4\").    Weight as of 8/30/21: 58.1 kg (128 lb).        She reports that she has never smoked. She has never used smokeless tobacco.      Appropriate preventive services were discussed with this patient, including applicable screening as appropriate for cardiovascular disease, diabetes, osteopenia/osteoporosis, and glaucoma.  As appropriate for age/gender, discussed screening for colorectal cancer, prostate cancer, breast cancer, and cervical cancer. Checklist reviewing preventive services available has been given to the patient.    Reviewed patients plan of care and provided an AVS. The Basic Care Plan (routine screening as documented in Health Maintenance) for Aysha meets the Care Plan requirement. This Care Plan has been established and reviewed with the Patient.    Counseling Resources:  ATP IV Guidelines  Pooled Cohorts Equation Calculator  Breast Cancer Risk Calculator  Breast Cancer: Medication to Reduce Risk  FRAX Risk Assessment  ICSI Preventive Guidelines  Dietary Guidelines for Americans, 2010  [employer]'s MyPlate  ASA Prophylaxis  Lung CA Screening    Agustin Peguero MD  [facility]    Identified Health Risks:  "

## 2022-08-24 ENCOUNTER — TELEPHONE (OUTPATIENT)
Dept: FAMILY MEDICINE | Facility: CLINIC | Age: 87
End: 2022-08-24

## 2022-08-24 DIAGNOSIS — Z92.29 PERSONAL HISTORY OF DRUG THERAPY: ICD-10-CM

## 2022-08-24 DIAGNOSIS — M81.0 AGE-RELATED OSTEOPOROSIS WITHOUT CURRENT PATHOLOGICAL FRACTURE: Primary | ICD-10-CM

## 2022-08-24 NOTE — TELEPHONE ENCOUNTER
Pt needs new order for Prolia. Cam order pended. Please advice approval and send message back to triage. Pt is scheduled for Prolia injection today.

## 2022-08-24 NOTE — TELEPHONE ENCOUNTER
Left voice message advising pt to call clinic back to reschedule Prolia nurse only visit today. Pt would need new Prolia order and PA approved before appt can nurse only visit can be scheduled.     On call back, please let her know triage will call her when Prolia orders are placed.     Routing message to triage pool to follow up with pt once Prolia and PA is approved.

## 2022-08-26 NOTE — TELEPHONE ENCOUNTER
Patient returning missed call. Relayed message below to patient and scheduled for RN visit 8/31/22.

## 2022-08-26 NOTE — TELEPHONE ENCOUNTER
Patient Contact    Attempt # 1    Was call answered?  No.  Left message on voicemail with information to call triage back.    On callback - notify patient kevinia has been authorized and schedule for nurse only visit     Tracey Reyes RN  [facility]

## 2022-08-31 ENCOUNTER — ALLIED HEALTH/NURSE VISIT (OUTPATIENT)
Dept: NURSING | Facility: CLINIC | Age: 87
End: 2022-08-31
Payer: MEDICARE

## 2022-08-31 DIAGNOSIS — M81.0 AGE-RELATED OSTEOPOROSIS WITHOUT CURRENT PATHOLOGICAL FRACTURE: Primary | ICD-10-CM

## 2022-08-31 PROCEDURE — 99207 PR NO CHARGE NURSE ONLY: CPT

## 2022-08-31 PROCEDURE — 96372 THER/PROPH/DIAG INJ SC/IM: CPT | Performed by: INTERNAL MEDICINE

## 2022-08-31 NOTE — PROGRESS NOTES
Clinic Administered Medication Documentation    Administrations This Visit     denosumab (PROLIA) injection 60 mg     Admin Date  08/31/2022 Action  Given Dose  60 mg Route  Subcutaneous Site   Administered By  Derek Olivera RN    Ordering Provider: Agustin Peguero MD    Patient Supplied?: No    Comments: pt is due for injection              Right arm    Prolia Documentation    Prior to injection, verified patient identity using patient's name and date of birth. Medication was administered. Please see MAR and medication order for additional information. Patient instructed to remain in clinic for 15 minutes and report any adverse reaction to staff immediately .    Indication: Prolia  (denosumab) is a prescription medicine used to treat osteoporosis in patients who:     Are at high risk for fracture, meaning patients who have had a fracture related to osteoporosis, or who have multiple risk factors for fracture.    Cannot use another osteoporosis medicine or other osteoporosis medicines did not work well.    The timeline for early/late injections would be 4 weeks early and any time after the 6 month maria dolores. If a patient receives their injection late, then the subsequent injection would be 6 months from the date that they actually received the injection.    When was the last injection?  02/01/2022  Was the last injection at least 6 months ago? Yes  Has the prior authorization been completed?  Yes  Is there an active order (within the past 365 days) in the chart?  Yes  Patient denies any dental work involving the bone (e.g. tooth extraction or dental implants) in the past 4 weeks?  Yes  Patient denies plans for any dental work involving the bone (e.g. tooth extraction or dental implants) in the next 4 weeks? Yes    The following steps were completed to comply with the REMS program for Prolia:    Reviewed information in the Medication Guide and Patient Counseling Chart, including the serious risks of Prolia  and  the symptoms of each risk.    Advised patient to seek prompt medical attention if they have signs or symptoms of any of the serious risks.    Provided each patient a copy of the Medication Guide and Patient Brochure.      Was entire vial of medication used? Yes  Vial/Syringe: Syringe  Expiration Date:  10/24  Was this medication supplied by the patient? No

## 2022-10-23 ENCOUNTER — HEALTH MAINTENANCE LETTER (OUTPATIENT)
Age: 87
End: 2022-10-23

## 2023-02-07 ENCOUNTER — NURSE TRIAGE (OUTPATIENT)
Dept: FAMILY MEDICINE | Facility: CLINIC | Age: 88
End: 2023-02-07

## 2023-02-07 ENCOUNTER — VIRTUAL VISIT (OUTPATIENT)
Dept: URGENT CARE | Facility: CLINIC | Age: 88
End: 2023-02-07
Payer: MEDICARE

## 2023-02-07 DIAGNOSIS — U07.1 COVID: Primary | ICD-10-CM

## 2023-02-07 PROCEDURE — 99441 PR PHYSICIAN TELEPHONE EVALUATION 5-10 MIN: CPT | Mod: CS | Performed by: EMERGENCY MEDICINE

## 2023-02-07 NOTE — PROGRESS NOTES
"  The patient has been notified of following:     \"This telephone visit will be conducted via a call between you and your physician/provider. We have found that certain health care needs can be provided without the need for a physical exam.  This service lets us provide the care you need with a short phone conversation.  If a prescription is necessary we can send it directly to your pharmacy.  If lab work is needed we can place an order for that and you can then stop by our lab to have the test done at a later time.    Telephone visits are billed at different rates depending on your insurance coverage. During this emergency period, for some insurers they may be billed the same as an in-person visit.  Please reach out to your insurance provider with any questions.    If during the course of the call the physician/provider feels a telephone visit is not appropriate, you will not be charged for this service.\"    Patient has given verbal consent for Telephone visit?  Yes    What phone number would you like to be contacted at?  [phone]    How would you like to obtain your AVS? MyChart    Subjective   CC: Aysha Rose  is a 87 year old female who presents via phone visit today for the following health issues:   Chief Complaint   Patient presents with     Infection        COVID-19 Symptom Review  How many days ago did these symptoms start? 2    Are any of the following symptoms significant for you?    New or worsening difficulty breathing? No    Worsening cough? Yes, it's a dry cough.     Fever or chills? No    Headache: No    Sore throat: No    Chest pain: No    Diarrhea: No    Body aches? No    What treatments has patient tried? Acetaminophen   Does patient live in a nursing home, group home, or shelter? No  Does patient have a way to get food/medications during quarantined? yes                    Reviewed and updated as needed this visit by Provider                  Review of Systems         Objective    Gen: " Patient is alert, oriented  Gen: No acute distress on phone appointment.  Sounds upbeat and cheerful.  Nondyspneic sounding speaking in full sentences.              Assessment/Plan:  Patient is an 87-year-old female who is on day 2 of COVID symptoms.  Symptoms are typical and quite mild.  Pulse ox 96% at home.  No subjective shortness of breath.  Patient's comorbidities are that of age of 87 and history of treated hypertension.  She consents to taking paxlovid.  GFR is 84.  Patient is instructed to take half dose amlodipine for the 5 days of paxlovid treatment and hold her simvastatin for the 5 days of paxlovid treatment and then resume.    Phone call duration:  10 minutes    Jerrell Reyna MD

## 2023-02-07 NOTE — TELEPHONE ENCOUNTER
Appointments in Next Year    Feb 07, 2023 10:00 AM  Covid Treatment Visit with VU VIRTUAL CARE PROVIDER  [facility] ([facility]  ) [phone]   Mar 06, 2023  9:30 AM  MA Visit with DEVENDRA VALENZUELA/LPN  [facility] ([facility] ) [phone]          RN COVID TREATMENT VISIT  02/07/23    Aysha Rose  87 year old  Current weight? 135  Has the patient been seen by a primary care provider at an [facility] or [facility] within the past two years? Yes.   Have you been in close proximity to/do you have a known exposure to a person with a confirmed case of influenza? No.     Date of positive COVID test (PCR or at home)?  02/07/23    Current COVID symptoms: congestion or runny nose    Date COVID symptoms began: 02/06/23    Do you have any of the following conditions that place you at risk of being very sick from COVID-19? 65 years or older    Is patient eligible to continue? Yes, established patient, 12 years or older weighing at least 88.2 lbs, who has COVID symptoms that started in the past 5 days and is at risk for being very sick from COVID-19.       Have you received monoclonal antibodies or oral antiviral medications since testing positive to COVID-19? No    Are you currently hospitalized for COVID-19? No    Do you have a history of hepatitis? No    Are you currently pregnant or nursing? No    Do you have a clinically significant hypersensitivity to nirmatrelvir, ritonavir, or molnupiravir? No    Do you have any history of severe renal impairment (eGFR < 30mL/min)? No    Do you have any history of hepatic impairment or abnormalities (e.g. hepatic panel, ALT, AST, ALK Phos, bilirubin)? No    Have you had a coronary stent placed in the previous 6 months? No    Is patient eligible to continue?   Yes, patient meets all eligibility requirements for the RN COVID treatment (as denoted by all no responses  above).     Current Outpatient Medications   Medication Sig Dispense Refill     acetaminophen (TYLENOL) 500 MG tablet Take 1,000 mg by mouth 4 times daily as needed for mild pain       amLODIPine (NORVASC) 2.5 MG tablet Take 1 tablet (2.5 mg) by mouth daily 90 tablet 3     calcium carbonate 600 mg-vitamin D 400 units (CALTRATE) 600-400 MG-UNIT per tablet Take 2 tablets by mouth every evening        cephALEXin (KEFLEX) 500 MG capsule TK 4 CS PO 30 TO 60 MINUTES PRIOR TO DENTAL WORK  2     denosumab (PROLIA) 60 MG/ML SOSY injection Inject 1 mL (60 mg) Subcutaneous every 6 months  0     denosumab (PROLIA) 60 MG/ML SOSY injection Inject 1 mL (60 mg) Subcutaneous every 6 months 1 mL 1     fish oil-omega-3 fatty acids 1000 MG capsule Take 2 g by mouth daily       irbesartan-hydrochlorothiazide (AVALIDE) 150-12.5 MG tablet Take 1 tablet by mouth daily 90 tablet 3     pantoprazole (PROTONIX) 40 MG EC tablet Take 1 tablet (40 mg) by mouth daily 90 tablet 3     polyethylene glycol (MIRALAX/GLYCOLAX) powder Take 1 capful by mouth daily as needed (While taking Norco)       Psyllium (METAMUCIL FREE & NATURAL PO) Take 3 Tablespoonful by mouth every evening       simvastatin (ZOCOR) 20 MG tablet TAKE 1 TABLET(20 MG) BY MOUTH EVERY EVENING 90 tablet 3     vitamin C (ASCORBIC ACID) 500 MG tablet Take 500 mg by mouth every other day         Medications from List 1 of the standing order (on medications that exclude the use of Paxlovid) that patient is taking: NONE. Is patient taking Goran's Wort? No  Is patient taking [place]'s Wort or any meds from List 1? No.   Medications from List 2 of the standing order (on meds that provider needs to adjust) that patient is taking: amlodipine (Norvasc), explained a provider visit is necessary to discuss medication adjustments while taking Paxlovid. Is patient on any of the meds from List 2? Yes. Patient will be transferred to a  at the end of this call.   Dejah Braden RN               Reason for Disposition    [1] COVID-19 diagnosed by positive lab test (e.g., PCR, rapid self-test kit) AND [2] mild symptoms (e.g., cough, fever, others) AND [3] no complications or SOB    Additional Information    Negative: SEVERE difficulty breathing (e.g., struggling for each breath, speaks in single words)    Negative: Difficult to awaken or acting confused (e.g., disoriented, slurred speech)    Negative: Bluish (or gray) lips or face now    Negative: Shock suspected (e.g., cold/pale/clammy skin, too weak to stand, low BP, rapid pulse)    Negative: Sounds like a life-threatening emergency to the triager    Negative: [1] Diagnosed or suspected COVID-19 AND [2] symptoms lasting 3 or more weeks    Negative: [1] COVID-19 exposure AND [2] no symptoms    Negative: COVID-19 vaccine reaction suspected (e.g., fever, headache, muscle aches) occurring 1 to 3 days after getting vaccine    Negative: COVID-19 vaccine, questions about    Negative: [1] Lives with someone known to have influenza (flu test positive) AND [2] flu-like symptoms (e.g., cough, runny nose, sore throat, SOB; with or without fever)    Negative: [1] Adult with possible COVID-19 symptoms AND [2] triager concerned about severity of symptoms or other causes    Negative: COVID-19 and breastfeeding, questions about    Negative: SEVERE or constant chest pain or pressure  (Exception: Mild central chest pain, present only when coughing.)    Negative: MODERATE difficulty breathing (e.g., speaks in phrases, SOB even at rest, pulse 100-120)    Negative: Headache and stiff neck (can't touch chin to chest)    Negative: Oxygen level (e.g., pulse oximetry) 90 percent or lower    Negative: Chest pain or pressure  (Exception: MILD central chest pain, present only when coughing)    Negative: Patient sounds very sick or weak to the triager    Negative: MILD difficulty breathing (e.g., minimal/no SOB at rest, SOB with walking, pulse <100)    Negative: Fever > 103 F  "(39.4 C)    Negative: [1] Fever > 101 F (38.3 C) AND [2] over 60 years of age    Negative: [1] Fever > 100.0 F (37.8 C) AND [2] bedridden (e.g., nursing home patient, CVA, chronic illness, recovering from surgery)    Negative: [1] HIGH RISK for severe COVID complications (e.g., weak immune system, age > 64 years, obesity with BMI of 30 or higher, pregnant, chronic lung disease or other chronic medical condition) AND [2] COVID symptoms (e.g., cough, fever)  (Exceptions: Already seen by PCP and no new or worsening symptoms.)    Negative: [1] HIGH RISK patient AND [2] influenza is widespread in the community AND [3] ONE OR MORE respiratory symptoms: cough, sore throat, runny or stuffy nose    Negative: [1] HIGH RISK patient AND [2] influenza exposure within the last 7 days AND [3] ONE OR MORE respiratory symptoms: cough, sore throat, runny or stuffy nose    Negative: Oxygen level (e.g., pulse oximetry) 91 to 94 percent    Negative: [1] COVID-19 infection suspected by caller or triager AND [2] mild symptoms (cough, fever, or others) AND [3] negative COVID-19 rapid test    Negative: Fever present > 3 days (72 hours)    Negative: [1] Fever returns after gone for over 24 hours AND [2] symptoms worse or not improved    Negative: [1] Continuous (nonstop) coughing interferes with work or school AND [2] no improvement using cough treatment per Care Advice    Negative: Cough present > 3 weeks    Negative: [1] COVID-19 diagnosed by positive lab test (e.g., PCR, rapid self-test kit) AND [2] NO symptoms (e.g., cough, fever, others)    Answer Assessment - Initial Assessment Questions  1. COVID-19 DIAGNOSIS: \"Who made your COVID-19 diagnosis?\" \"Was it confirmed by a positive lab test or self-test?\" If not diagnosed by a doctor (or NP/PA), ask \"Are there lots of cases (community spread) where you live?\" Note: See public health department website, if unsure.      Home test  2. COVID-19 EXPOSURE: \"Was there any known exposure to COVID " "before the symptoms began?\" CDC Definition of close contact: within 6 feet (2 meters) for a total of 15 minutes or more over a 24-hour period.       Yes, tory  3. ONSET: \"When did the COVID-19 symptoms start?\"       02/06/23  4. WORST SYMPTOM: \"What is your worst symptom?\" (e.g., cough, fever, shortness of breath, muscle aches)      Head feels full  5. COUGH: \"Do you have a cough?\" If Yes, ask: \"How bad is the cough?\"        no  6. FEVER: \"Do you have a fever?\" If Yes, ask: \"What is your temperature, how was it measured, and when did it start?\"      no  7. RESPIRATORY STATUS: \"Describe your breathing?\" (e.g., shortness of breath, wheezing, unable to speak)       no  8. BETTER-SAME-WORSE: \"Are you getting better, staying the same or getting worse compared to yesterday?\"  If getting worse, ask, \"In what way?\"      same  9. HIGH RISK DISEASE: \"Do you have any chronic medical problems?\" (e.g., asthma, heart or lung disease, weak immune system, obesity, etc.)      HTN  10. VACCINE: \"Have you had the COVID-19 vaccine?\" If Yes, ask: \"Which one, how many shots, when did you get it?\"        Yes  11. BOOSTER: \"Have you received your COVID-19 booster?\" If Yes, ask: \"Which one and when did you get it?\"        Three booster  12. PREGNANCY: \"Is there any chance you are pregnant?\" \"When was your last menstrual period?\"        N/A  13. OTHER SYMPTOMS: \"Do you have any other symptoms?\"  (e.g., chills, fatigue, headache, loss of smell or taste, muscle pain, sore throat)        Head fullness, no other symptoms  14. O2 SATURATION MONITOR:  \"Do you use an oxygen saturation monitor (pulse oximeter) at home?\" If Yes, ask \"What is your reading (oxygen level) today?\" \"What is your usual oxygen saturation reading?\" (e.g., 95%)        96% on room air    Protocols used: CORONAVIRUS (COVID-19) DIAGNOSED OR [id number]    Dejah Braden RN on 2/7/2023 at 7:26 AM      "

## 2023-02-21 ENCOUNTER — TRANSFERRED RECORDS (OUTPATIENT)
Dept: HEALTH INFORMATION MANAGEMENT | Facility: CLINIC | Age: 88
End: 2023-02-21
Payer: MEDICARE

## 2023-03-06 ENCOUNTER — MYC MEDICAL ADVICE (OUTPATIENT)
Dept: FAMILY MEDICINE | Facility: CLINIC | Age: 88
End: 2023-03-06

## 2023-03-06 ENCOUNTER — ALLIED HEALTH/NURSE VISIT (OUTPATIENT)
Dept: NURSING | Facility: CLINIC | Age: 88
End: 2023-03-06
Payer: MEDICARE

## 2023-03-06 DIAGNOSIS — M81.0 AGE-RELATED OSTEOPOROSIS WITHOUT CURRENT PATHOLOGICAL FRACTURE: Primary | ICD-10-CM

## 2023-03-06 DIAGNOSIS — E78.5 HYPERLIPIDEMIA LDL GOAL <130: ICD-10-CM

## 2023-03-06 PROCEDURE — 96372 THER/PROPH/DIAG INJ SC/IM: CPT | Performed by: INTERNAL MEDICINE

## 2023-03-06 PROCEDURE — 99207 PR NO CHARGE NURSE ONLY: CPT

## 2023-03-06 NOTE — PROGRESS NOTES
Clinic Administered Medication Documentation    Administrations This Visit     denosumab (PROLIA) injection 60 mg     Admin Date  03/06/2023 Action  $Given Dose  60 mg Route  Subcutaneous Site  Left Arm Administered By  Loretta Reyes RN    Ordering Provider: Agustin Peguero MD    NDC: 83311-555-46    Lot#: [id number]    : AMGEN    Patient Supplied?: No                  Prolia Documentation    Prior to injection, verified patient identity using patient's name and date of birth. Medication was administered. Please see MAR and medication order for additional information. Patient instructed to remain in clinic for 15 minutes and report any adverse reaction to staff immediately .    Indication: Prolia  (denosumab) is a prescription medicine used to treat osteoporosis in patients who:     Are at high risk for fracture, meaning patients who have had a fracture related to osteoporosis, or who have multiple risk factors for fracture.    Cannot use another osteoporosis medicine or other osteoporosis medicines did not work well.    The timeline for early/late injections would be 4 weeks early and any time after the 6 month maria dolores. If a patient receives their injection late, then the subsequent injection would be 6 months from the date that they actually received the injection.    When was the last injection?  8/31/22  Was the last injection at least 6 months ago? Yes  Has the prior authorization been completed?  Yes  Is there an active order (within the past 365 days) in the chart?  Yes  Patient denies any dental work involving the bone (e.g. tooth extraction or dental implants) in the past 4 weeks?  Yes  Patient denies plans for any dental work involving the bone (e.g. tooth extraction or dental implants) in the next 4 weeks? Yes    The following steps were completed to comply with the REMS program for Prolia:    Reviewed information in the Medication Guide and Patient Counseling Chart, including the serious  risks of Prolia  and the symptoms of each risk.    Advised patient to seek prompt medical attention if they have signs or symptoms of any of the serious risks.    Provided each patient a copy of the Medication Guide and Patient Brochure.      Was entire vial of medication used? Yes  Vial/Syringe: Syringe  Expiration Date:  3/31/2024  Was this medication supplied by the patient? No     Tracey Reyes RN  [facility]

## 2023-03-07 RX ORDER — SIMVASTATIN 20 MG
TABLET ORAL
Qty: 90 TABLET | Refills: 3 | Status: SHIPPED | OUTPATIENT
Start: 2023-03-07 | End: 2023-09-28

## 2023-03-07 NOTE — TELEPHONE ENCOUNTER
TO PCP    See [employer] message.     Prescription and pharmacy pended for your Review.     Dejah Braden RN on 3/7/2023 at 11:01 AM

## 2023-03-09 NOTE — ED NOTES
Bed: ED24  Expected date: 3/5/19  Expected time: 5:38 PM  Means of arrival: Ambulance  Comments:  519 83f fall, dizzy ETA [id number]     no

## 2023-04-06 ENCOUNTER — TRANSFERRED RECORDS (OUTPATIENT)
Dept: HEALTH INFORMATION MANAGEMENT | Facility: CLINIC | Age: 88
End: 2023-04-06
Payer: MEDICARE

## 2023-08-21 ENCOUNTER — MYC MEDICAL ADVICE (OUTPATIENT)
Dept: FAMILY MEDICINE | Facility: CLINIC | Age: 88
End: 2023-08-21
Payer: MEDICARE

## 2023-08-21 DIAGNOSIS — M81.0 AGE-RELATED OSTEOPOROSIS WITHOUT CURRENT PATHOLOGICAL FRACTURE: Primary | ICD-10-CM

## 2023-08-21 DIAGNOSIS — I10 ESSENTIAL HYPERTENSION WITH GOAL BLOOD PRESSURE LESS THAN 140/90: ICD-10-CM

## 2023-08-21 DIAGNOSIS — Z92.29 PERSONAL HISTORY OF DRUG THERAPY: ICD-10-CM

## 2023-08-23 NOTE — TELEPHONE ENCOUNTER
TO PCP:   See below message from patient     Prolia authorization expires 8/24/23 - pt will be due for next injection on/after 9/6/23. New order and insurance verification is needed - pended Josselin Leija RN  [facility]

## 2023-08-29 ENCOUNTER — TELEPHONE (OUTPATIENT)
Dept: FAMILY MEDICINE | Facility: CLINIC | Age: 88
End: 2023-08-29
Payer: MEDICARE

## 2023-08-29 RX ORDER — AMLODIPINE BESYLATE 2.5 MG/1
2.5 TABLET ORAL DAILY
Qty: 90 TABLET | Refills: 3 | Status: SHIPPED | OUTPATIENT
Start: 2023-08-29 | End: 2023-09-28

## 2023-08-29 RX ORDER — IRBESARTAN AND HYDROCHLOROTHIAZIDE 150; 12.5 MG/1; MG/1
1 TABLET, FILM COATED ORAL DAILY
Qty: 90 TABLET | Refills: 3 | Status: SHIPPED | OUTPATIENT
Start: 2023-08-29 | End: 2023-09-28

## 2023-08-29 NOTE — TELEPHONE ENCOUNTER
CC: Patient calling regarding refills needed as well as order for prolia injection.    Patient states she has sent multiple [employer] messages regarding this, see mychart encounter on 8/21/23.    Per chart review, refills for amlodipine and Irbesartan sent to patient's pharmacy as requested on 8/29/23 - notified patient of this. Patient expressed understanding and will follow up with pharmacy.    Patient also states she needs prolia injection. Per chart review, PCP ordered prolia on 8/23/23 and patient's last injection was 3/6/23. Writer scheduled patient for upcoming prolia:    9/7/2023 10:15 AM CS JAMESON NURSE [facility] CS     Routing to triage basket to follow up and ensure insurance is verified prior to 9/7/23.    Tracey Reyes RN  [facility]

## 2023-08-31 NOTE — TELEPHONE ENCOUNTER
Left message for Ka in pharmacy regarding status of prolia authorization.     Dejah Braden RN on 8/31/2023 at 1:18 PM

## 2023-09-01 NOTE — TELEPHONE ENCOUNTER
Received  message back from  who states patient is approved and appointment can be scheduled for prolia.     Patient has already been scheduled on 09/07/23 for prolia injection.    Dejah Braden RN on 9/1/2023 at 12:32 PM

## 2023-09-07 ENCOUNTER — ALLIED HEALTH/NURSE VISIT (OUTPATIENT)
Dept: NURSING | Facility: CLINIC | Age: 88
End: 2023-09-07
Payer: MEDICARE

## 2023-09-07 DIAGNOSIS — M81.0 AGE-RELATED OSTEOPOROSIS WITHOUT CURRENT PATHOLOGICAL FRACTURE: Primary | ICD-10-CM

## 2023-09-07 PROCEDURE — 96372 THER/PROPH/DIAG INJ SC/IM: CPT | Performed by: INTERNAL MEDICINE

## 2023-09-07 PROCEDURE — 99207 PR NO CHARGE NURSE ONLY: CPT

## 2023-09-07 NOTE — PROGRESS NOTES
Clinic Administered Medication Documentation      Prolia Documentation    Indication: Prolia  (denosumab) is a prescription medicine used to treat osteoporosis in patients who:   Are at high risk for fracture, meaning patients who have had a fracture related to osteoporosis, or who have multiple risk factors for fracture.  Cannot use another osteoporosis medicine or other osteoporosis medicines did not work well.  The timeline for early/late injections would be 4 weeks early and any time after the 6 month maria dolores. If a patient receives their injection late, then the subsequent injection would be 6 months from the date that they actually received the injection.    When was the last injection?  23  Was the last injection at least 6 months ago? Yes  Has the prior authorization been completed?  Yes  Is there an active order (written within the past 365 days, with administrations remaining, not ) in the chart?  Yes  Patient denies any dental work involving the bone (e.g. tooth extraction or dental implants) in the past 4 weeks?  Yes  Patient denies plans for any dental work involving the bone (e.g. tooth extraction or dental implants) in the next 4 weeks? Yes    The following steps were completed to comply with the REMS program for Prolia:  Reviewed information in the Medication Guide and Patient Counseling Chart, including the serious risks of Prolia  and the symptoms of each risk.  Advised patient to seek prompt medical attention if they have signs or symptoms of any of the serious risks.  Provided each patient a copy of the Medication Guide and Patient Brochure.    Prior to injection, verified patient identity using patient's name and date of birth. Medication was administered. Please see MAR and medication order for additional information. Patient instructed to remain in clinic for 15 minutes and report any adverse reaction to staff immediately.    Vial/Syringe: Syringe  Was this medication supplied by the  patient? No  Verified that the patient has refills remaining in their prescription.    Joselin Szymanski RN  [facility]

## 2023-09-21 ASSESSMENT — ENCOUNTER SYMPTOMS
BREAST MASS: 1
CONSTIPATION: 1
HEARTBURN: 1

## 2023-09-21 ASSESSMENT — ACTIVITIES OF DAILY LIVING (ADL): CURRENT_FUNCTION: NO ASSISTANCE NEEDED

## 2023-09-25 ENCOUNTER — LAB (OUTPATIENT)
Dept: LAB | Facility: CLINIC | Age: 88
End: 2023-09-25
Payer: MEDICARE

## 2023-09-25 DIAGNOSIS — Z00.00 MEDICARE ANNUAL WELLNESS VISIT, SUBSEQUENT: ICD-10-CM

## 2023-09-25 DIAGNOSIS — I10 BENIGN ESSENTIAL HYPERTENSION: ICD-10-CM

## 2023-09-25 DIAGNOSIS — C50.919 MALIGNANT NEOPLASM OF FEMALE BREAST, UNSPECIFIED ESTROGEN RECEPTOR STATUS, UNSPECIFIED LATERALITY, UNSPECIFIED SITE OF BREAST (H): ICD-10-CM

## 2023-09-25 DIAGNOSIS — E78.5 HYPERLIPIDEMIA LDL GOAL <130: ICD-10-CM

## 2023-09-25 LAB
ALBUMIN SERPL BCG-MCNC: 4.7 G/DL (ref 3.5–5.2)
ALP SERPL-CCNC: 42 U/L (ref 35–104)
ALT SERPL W P-5'-P-CCNC: 16 U/L (ref 0–50)
ANION GAP SERPL CALCULATED.3IONS-SCNC: 15 MMOL/L (ref 7–15)
AST SERPL W P-5'-P-CCNC: 31 U/L (ref 0–45)
BILIRUB SERPL-MCNC: 0.4 MG/DL
BUN SERPL-MCNC: 12.9 MG/DL (ref 8–23)
CALCIUM SERPL-MCNC: 10.4 MG/DL (ref 8.8–10.2)
CHLORIDE SERPL-SCNC: 99 MMOL/L (ref 98–107)
CHOLEST SERPL-MCNC: 218 MG/DL
CREAT SERPL-MCNC: 0.67 MG/DL (ref 0.51–0.95)
DEPRECATED HCO3 PLAS-SCNC: 23 MMOL/L (ref 22–29)
EGFRCR SERPLBLD CKD-EPI 2021: 84 ML/MIN/1.73M2
ERYTHROCYTE [DISTWIDTH] IN BLOOD BY AUTOMATED COUNT: 12.6 % (ref 10–15)
GLUCOSE SERPL-MCNC: 103 MG/DL (ref 70–99)
HCT VFR BLD AUTO: 41.5 % (ref 35–47)
HDLC SERPL-MCNC: 71 MG/DL
HGB BLD-MCNC: 14.2 G/DL (ref 11.7–15.7)
LDLC SERPL CALC-MCNC: 123 MG/DL
MCH RBC QN AUTO: 31.1 PG (ref 26.5–33)
MCHC RBC AUTO-ENTMCNC: 34.2 G/DL (ref 31.5–36.5)
MCV RBC AUTO: 91 FL (ref 78–100)
NONHDLC SERPL-MCNC: 147 MG/DL
PLATELET # BLD AUTO: 187 10E3/UL (ref 150–450)
POTASSIUM SERPL-SCNC: 4.7 MMOL/L (ref 3.4–5.3)
PROT SERPL-MCNC: 7.4 G/DL (ref 6.4–8.3)
RBC # BLD AUTO: 4.56 10E6/UL (ref 3.8–5.2)
SODIUM SERPL-SCNC: 137 MMOL/L (ref 136–145)
TRIGL SERPL-MCNC: 121 MG/DL
WBC # BLD AUTO: 5 10E3/UL (ref 4–11)

## 2023-09-25 PROCEDURE — 80061 LIPID PANEL: CPT

## 2023-09-25 PROCEDURE — 36415 COLL VENOUS BLD VENIPUNCTURE: CPT

## 2023-09-25 PROCEDURE — 85027 COMPLETE CBC AUTOMATED: CPT

## 2023-09-25 PROCEDURE — 80053 COMPREHEN METABOLIC PANEL: CPT

## 2023-09-28 ENCOUNTER — OFFICE VISIT (OUTPATIENT)
Dept: FAMILY MEDICINE | Facility: CLINIC | Age: 88
End: 2023-09-28
Payer: MEDICARE

## 2023-09-28 ENCOUNTER — PATIENT OUTREACH (OUTPATIENT)
Dept: ONCOLOGY | Facility: CLINIC | Age: 88
End: 2023-09-28

## 2023-09-28 VITALS
DIASTOLIC BLOOD PRESSURE: 79 MMHG | HEART RATE: 78 BPM | HEIGHT: 64 IN | SYSTOLIC BLOOD PRESSURE: 133 MMHG | BODY MASS INDEX: 24.92 KG/M2 | OXYGEN SATURATION: 97 % | TEMPERATURE: 97.7 F | WEIGHT: 146 LBS | RESPIRATION RATE: 16 BRPM

## 2023-09-28 DIAGNOSIS — Z00.00 MEDICARE ANNUAL WELLNESS VISIT, SUBSEQUENT: Primary | ICD-10-CM

## 2023-09-28 DIAGNOSIS — M81.0 AGE-RELATED OSTEOPOROSIS WITHOUT CURRENT PATHOLOGICAL FRACTURE: ICD-10-CM

## 2023-09-28 DIAGNOSIS — E78.5 HYPERLIPIDEMIA LDL GOAL <130: ICD-10-CM

## 2023-09-28 DIAGNOSIS — D50.9 IRON DEFICIENCY ANEMIA, UNSPECIFIED IRON DEFICIENCY ANEMIA TYPE: ICD-10-CM

## 2023-09-28 DIAGNOSIS — I10 ESSENTIAL HYPERTENSION WITH GOAL BLOOD PRESSURE LESS THAN 140/90: ICD-10-CM

## 2023-09-28 DIAGNOSIS — I10 BENIGN ESSENTIAL HYPERTENSION: ICD-10-CM

## 2023-09-28 DIAGNOSIS — C50.919 MALIGNANT NEOPLASM OF FEMALE BREAST, UNSPECIFIED ESTROGEN RECEPTOR STATUS, UNSPECIFIED LATERALITY, UNSPECIFIED SITE OF BREAST (H): ICD-10-CM

## 2023-09-28 DIAGNOSIS — C50.919 INVASIVE LOBULAR CARCINOMA OF BREAST IN FEMALE (H): ICD-10-CM

## 2023-09-28 PROCEDURE — G0439 PPPS, SUBSEQ VISIT: HCPCS | Performed by: INTERNAL MEDICINE

## 2023-09-28 RX ORDER — ANASTROZOLE 1 MG/1
1 TABLET ORAL
COMMUNITY
Start: 2023-09-27 | End: 2023-10-23

## 2023-09-28 RX ORDER — AMLODIPINE BESYLATE 2.5 MG/1
2.5 TABLET ORAL DAILY
Qty: 90 TABLET | Refills: 3 | Status: SHIPPED | OUTPATIENT
Start: 2023-09-28 | End: 2024-02-13

## 2023-09-28 RX ORDER — IRBESARTAN AND HYDROCHLOROTHIAZIDE 150; 12.5 MG/1; MG/1
1 TABLET, FILM COATED ORAL DAILY
Qty: 90 TABLET | Refills: 3 | Status: SHIPPED | OUTPATIENT
Start: 2023-09-28

## 2023-09-28 RX ORDER — SIMVASTATIN 20 MG
TABLET ORAL
Qty: 90 TABLET | Refills: 3 | Status: SHIPPED | OUTPATIENT
Start: 2023-09-28

## 2023-09-28 ASSESSMENT — ENCOUNTER SYMPTOMS
BREAST MASS: 1
CONSTIPATION: 1
HEARTBURN: 1

## 2023-09-28 ASSESSMENT — ACTIVITIES OF DAILY LIVING (ADL): CURRENT_FUNCTION: NO ASSISTANCE NEEDED

## 2023-09-28 ASSESSMENT — PAIN SCALES - GENERAL: PAINLEVEL: NO PAIN (0)

## 2023-09-28 NOTE — PROGRESS NOTES
SUBJECTIVE:   Tasha is a 87 year old who presents for Preventive Visit.    Overall she is doing well but did have another breast cancer with lumpectomy done at [facility] just a week or so ago.  She has had follow-up with medical oncology there and is to be on Arimidex.  She does not need radiation.  Her wound is healing well.    She otherwise is doing quite well.  She does exercise some.  She is on Prolia for her osteoporosis and I explained the need not to stop it or she could have rapid bone loss.               Past Medical History:      Past Medical History:   Diagnosis Date    Abdominal pain 09/2013    ct abd and pelvis neg    Benign essential hypertension     nl mr renogram    Breast cancer (H) 2007    lumpectomy and xrt [facility], got 5 years of arimidex    Erosive gastritis 10/2011    by egd, colon as above, also small ulcer    High cholesterol     Hx of colonoscopy 2007, 2011    nl, 2011 with cecal angioect and 2mm polyp    FRANKLIN (iron deficiency anaemia) 2011, 2016 2011 colonoscopy cecal angioectasia, egd with hh, small ulcer    Invasive lobular carcinoma of breast in female (H) 08/2023    lumpectomy done [place], right breast, added arimidex, no xrt    Left flank pain 05/2021    ct abd and pelvis nl    Microscopic hematuria 2012    Dr. Alonzo, ct done 11/28/12 negative, cysto neg    Osteopenia 2008    [place], fu 2014 [place] and worse -1.5 left hip; fu here 7/18 a bit worse    Osteoporosis 2019    based on low impact fx of left shoulder, added prolia then    Syncope 2011    neg est echo             Past Surgical History:      Past Surgical History:   Procedure Laterality Date    CATARACT IOL, RT/LT      LUMPECTOMY BREAST  2007    lumpectomy right breast  09/2023    [place], right breast    XR SHOULDER SURGERY WENDY LEFT Left 03/2019    [facility]             Social History:     Social History     Socioeconomic History    Marital status:      Spouse name: Not on file    Number of children: 4    Years of education: Not on  file    Highest education level: Not on file   Occupational History    Occupation: homemaker   Tobacco Use    Smoking status: Never    Smokeless tobacco: Never   Substance and Sexual Activity    Alcohol use: No    Drug use: No    Sexual activity: Yes     Partners: Male   Other Topics Concern    Parent/sibling w/ CABG, MI or angioplasty before 65F 55M? Not Asked   Social History Narrative    Not on file     Social Determinants of Health     Financial Resource Strain: Low Risk  (9/21/2023)    Financial Resource Strain     Within the past 12 months, have you or your family members you live with been unable to get utilities (heat, electricity) when it was really needed?: No   Food Insecurity: Low Risk  (9/21/2023)    Food Insecurity     Within the past 12 months, did you worry that your food would run out before you got money to buy more?: No     Within the past 12 months, did the food you bought just not last and you didn t have money to get more?: No   Transportation Needs: Low Risk  (9/21/2023)    Transportation Needs     Within the past 12 months, has lack of transportation kept you from medical appointments, getting your medicines, non-medical meetings or appointments, work, or from getting things that you need?: No   Physical Activity: Not on file   Stress: Not on file   Social Connections: Not on file   Interpersonal Safety: Not on file   Housing Stability: Low Risk  (9/21/2023)    Housing Stability     Do you have housing? : Yes     Are you worried about losing your housing?: No             Family History:   reviewed         Allergies:   No Known Allergies          Medications:     Current Outpatient Medications   Medication Sig Dispense Refill    acetaminophen (TYLENOL) 500 MG tablet Take 1,000 mg by mouth 4 times daily as needed for mild pain      amLODIPine (NORVASC) 2.5 MG tablet Take 1 tablet (2.5 mg) by mouth daily 90 tablet 3    anastrozole (ARIMIDEX) 1 MG tablet Take 1 mg by mouth      calcium carbonate  "600 mg-vitamin D 400 units (CALTRATE) 600-400 MG-UNIT per tablet Take 2 tablets by mouth every evening       cephALEXin (KEFLEX) 500 MG capsule TK 4 CS PO 30 TO 60 MINUTES PRIOR TO DENTAL WORK  2    denosumab (PROLIA) 60 MG/ML SOSY injection Inject 1 mL (60 mg) Subcutaneous every 6 months 1 mL 1    fish oil-omega-3 fatty acids 1000 MG capsule Take 2 g by mouth daily      irbesartan-hydrochlorothiazide (AVALIDE) 150-12.5 MG tablet Take 1 tablet by mouth daily 90 tablet 3    pantoprazole (PROTONIX) 40 MG EC tablet Take 1 tablet (40 mg) by mouth daily 90 tablet 3    polyethylene glycol (MIRALAX/GLYCOLAX) powder Take 1 capful by mouth daily as needed (While taking Norco)      Psyllium (METAMUCIL FREE & NATURAL PO) Take 3 Tablespoonful by mouth every evening      simvastatin (ZOCOR) 20 MG tablet TAKE 1 TABLET(20 MG) BY MOUTH EVERY EVENING 90 tablet 3    vitamin C (ASCORBIC ACID) 500 MG tablet Take 500 mg by mouth every other day      denosumab (PROLIA) 60 MG/ML SOSY injection Inject 1 mL (60 mg) Subcutaneous every 6 months  0               Review of Systems:     The 10 point Review of Systems is negative other than noted in the HPI           Physical Exam:   Blood pressure 133/79, pulse 78, temperature 97.7  F (36.5  C), temperature source Temporal, resp. rate 16, height 1.613 m (5' 3.5\"), weight 66.2 kg (146 lb), SpO2 97 %, not currently breastfeeding.    Exam:  Constitutional: healthy appearing, alert and in no distress  Heent: Normocephalic. Head without obvious masses or lesions. PERRLDC, EOMI. Mouth exam within normal limits: tongue, mucous membranes, posterior pharynx all normal, no lesions or abnormalities seen.  Tm's and canals within normal limits bilaterally. Neck supple, no nuchal rigidity or masses. No supraclavicular, or cervical adenopathy. Thyroid symmetric, no masses.  Cardiovascular: Regular rate and rhythm, no murmer, rub or gallops.  JVP not elevated, no edema.  Carotids within normal limits " "bilaterally, no bruits.  Respiratory: Normal respiratory effort.  Lungs clear, normal flow, no wheezing or crackles.  Gastrointestinal: Normal active bowel sounds.   Soft, not tender, no masses, guarding or rebound.  No hepatosplenomegaly.   Musculoskeletal: extremities normal, no gross deformities noted.  Skin: no suspicious lesions or rashes   Neurologic: Mental status within normal limits.  Speech fluent.  No gross motor abnormalities and gait intact.  Psychiatric: mentation appears normal and affect normal.         Data:   Labs reviewed with patient         Assessment:   Normal complete physical exam  Breast cancer, new dx, on arimidex, she requests follow up onc here and did referral  Hypertension, controlled  Elevated cholesterol on statin  Osteopor, prolia  Consuelo, no issues  hcm         Plan:   Immunizations at pharm  Follow up med onc  Exercise, diet  Call if problems  Prolia every 6 months      Agustin Peguero M.D.              Are you in the first 12 months of your Medicare coverage?  No    Healthy Habits:     In general, how would you rate your overall health?  Good    Frequency of exercise:  6-7 days/week    Duration of exercise:  45-60 minutes    Do you usually eat at least 4 servings of fruit and vegetables a day, include whole grains    & fiber and avoid regularly eating high fat or \"junk\" foods?  Yes    Taking medications regularly:  Yes    Medication side effects:  None    Ability to successfully perform activities of daily living:  No assistance needed    Home Safety:  No safety concerns identified    Hearing Impairment:  No hearing concerns    In the past 6 months, have you been bothered by leaking of urine? Yes    In general, how would you rate your overall mental or emotional health?  Good    Additional concerns today:  Yes      Today's PHQ-2 Score:       9/28/2023     1:24 PM   PHQ-2 ( 1999 Pfizer)   Q1: Little interest or pleasure in doing things 0   Q2: Feeling down, depressed or hopeless 1 "   PHQ-2 Score 1   Q1: Little interest or pleasure in doing things Not at all   Q2: Feeling down, depressed or hopeless Several days   PHQ-2 Score 1           Have you ever done Advance Care Planning? (For example, a Health Directive, POLST, or a discussion with a medical provider or your loved ones about your wishes): Yes, advance care planning is on file.       Fall risk  Fallen 2 or more times in the past year?: No  Any fall with injury in the past year?: No    Cognitive Screening   1) Repeat 3 items (Leader, Season, Table)    2) Clock draw: NORMAL  3) 3 item recall: Recalls 3 objects  Results: 3 items recalled: COGNITIVE IMPAIRMENT LESS LIKELY    Mini-CogTM Copyright S Carloz. Licensed by the author for use in [facility]; reprinted with permission (horace@.[facility]). All rights reserved.      Do you have sleep apnea, excessive snoring or daytime drowsiness? : no    Reviewed and updated as needed this visit by clinical staff                  Reviewed and updated as needed this visit by Provider                 Social History     Tobacco Use    Smoking status: Never    Smokeless tobacco: Never   Substance Use Topics    Alcohol use: No             9/21/2023     2:18 PM   Alcohol Use   Prescreen: >3 drinks/day or >7 drinks/week? Not Applicable          No data to display              Do you have a current opioid prescription? No  Do you use any other controlled substances or medications that are not prescribed by a provider? None              Current providers sharing in care for this patient include:   Patient Care Team:  Agustin Peguero MD as PCP - General (Internal Medicine)  Agustin Peguero MD as Assigned PCP    The following health maintenance items are reviewed in Epic and correct as of today:  Health Maintenance   Topic Date Due    ANNUAL REVIEW OF HM ORDERS  Never done    COVID-19 Vaccine (5 - Pfizer series) 02/04/2023    MEDICARE ANNUAL WELLNESS VISIT  08/12/2023    INFLUENZA VACCINE (1)  "09/01/2023    FALL RISK ASSESSMENT  09/28/2024    ADVANCE CARE PLANNING  08/12/2027    DTAP/TDAP/TD IMMUNIZATION (7 - Td or Tdap) 08/12/2032    PHQ-2 (once per calendar year)  Completed    Pneumococcal Vaccine: 65+ Years  Completed    ZOSTER IMMUNIZATION  Completed    IPV IMMUNIZATION  Aged Out    HPV IMMUNIZATION  Aged Out    MENINGITIS IMMUNIZATION  Aged Out    MAMMO SCREENING  Discontinued             Pertinent mammograms are reviewed under the imaging tab.    Review of Systems   Gastrointestinal:  Positive for constipation and heartburn.   Breasts:  Positive for breast mass. Negative for tenderness and discharge.   Genitourinary:  Negative for pelvic pain, vaginal bleeding and vaginal discharge.         OBJECTIVE:   There were no vitals taken for this visit. Estimated body mass index is 24.06 kg/m  as calculated from the following:    Height as of 8/12/22: 1.613 m (5' 3.5\").    Weight as of 8/12/22: 62.6 kg (138 lb).  Physical Exam          ASSESSMENT / PLAN:             COUNSELING:  Reviewed preventive health counseling, as reflected in patient instructions       Regular exercise       Healthy diet/nutrition        She reports that she has never smoked. She has never used smokeless tobacco.      Appropriate preventive services were discussed with this patient, including applicable screening as appropriate for fall prevention, nutrition, physical activity, Tobacco-use cessation, weight loss and cognition.  Checklist reviewing preventive services available has been given to the patient.    Reviewed patients plan of care and provided an AVS. The Basic Care Plan (routine screening as documented in Health Maintenance) for Aysha meets the Care Plan requirement. This Care Plan has been established and reviewed with the Patient.          Agustin Peguero MD  [facility]    Identified Health Risks:  I have reviewed Opioid Use Disorder and Substance Use Disorder risk factors and made any needed " referrals.

## 2023-09-28 NOTE — PATIENT INSTRUCTIONS
Do the flu shot and covid shot and rsv at  your pharmacy.    See Dr. Payan of oncology for follow up to the breast cancer.    Agustin Peguero M.D.

## 2023-09-28 NOTE — PROGRESS NOTES
New Patient Oncology Nurse Navigator Note     Referring provider: Agustin Peguero MD      Referring Clinic/Organization: [facility]     Referred to (specialty:) Medical Oncology      Date Referral Received: September 28, 2023     Evaluation for:  C50.919 (ICD-10-CM) - Malignant neoplasm of female breast, unspecified estrogen receptor status, unspecified laterality, unspecified site of breast (H)     Clinical History (per Nurse review of records provided):      2007 1/19/2007  CASE:    Left breast, suspicious nodule, ultrasound-guided core biopsies:  1.  Infiltrating ductal carcinoma, Sol[place] and Jamison grade 1 of 3 (score 4 of 9).  2.  Background atypical intraductal hyperplasia and ductal carcinoma in situ present, not extensive.  3.  No evidence of necrosis or lymphovascular space involvement.   ESTROGEN RECEPTORS: Positive (greater than 90% of tumor nuclei stain).  PROGESTERONE RECEPTORS: Positive (approximately 30% of tumor nuclei stain)   HER2 by FISH - No evidence of amplificationof the HER2 gene was detected.     2/2/2007 [id number]  Breast, left, wide local excision:  Infiltrating ductal carcinoma, [place] Ward grade I (of III), [tubules 1/3, nuclei 2/3, mitoses 1/3; La Ward score 4/9], forming a 0.9 x 0.8 x 0.6 cm mass (AJCC    pT1b).  Ductal carcinoma in situ, low nuclear grade, comprises approximately 5-25% of tumor volume.  The non-neoplastic breast parenchyma shows nonproliferative fibrocystic changes.     Calcifications present in malignant ducts.  Biopsy site changes present. All surgical resection margins, after re-excision of the  medial margin, are negative for tumor (minimum tumor free margin, 0.7 cm, medial margin).       Lymph nodes, left axillary sentinel, excision:  Multiple (3) left axillary sentinel lymph nodes are negative for metastatic tumor [AJCC pN0(sn)].  Blue dye is identified in left axillary sentinel lymph node No. 1A.  Blue dye is not identified  in left axillary sentinel lymph nodes No.1B and No. 1C.  Immunohistochemical cytokeratin stain was performed on the paraffin embedded sentinel lymph node tissue and confirms the H&E impression.     ADDENDUM: HER2/frida protein overexpression is negative, score of 1+, according    to the interpretation guidelines in the FDA-approved HercepTest.        2007 Records needed:  Surgery consult, operative, and progress notes  Medical oncology consult and progress notes  Radiation oncology consult and progress notes  Radiation therapy summary    She completed 5 years of anastrozole in February 2012.    2013  Tasha met with Aleah Faust CGC on 4/8/2013 and proceeded with  BRCA 1 and 2 sequencing with reflex to LENNIE (BRCA rearrangement test ).  2013 Genetic Testing and Tumor Genotyping  [facility] did NOT detect any deleterious mutations in the sequence of BRCA1 or 2  Paula did not order again in 2023.    2023  Tasha has bilateral screening mammograms on 7/28/23 and an asymmetry right breast central to the nipple posterior depth on the CC view. Posttreatment changes left breast. Possible asymmetry outer left breast posterior depth on the CC view. Additional imaging evaluation of both breasts is recommended.   Diagnostic imaging followed on 8/3:  Impression  1.  Suspicious 6 mm irregular hypoechoic mass at the 6 o'clock position 4 cm from nipple in the right breast.  2.  No right axillary lymphadenopathy.  3.  Postsurgical changes in the left outer breast.    8/4/23 - [id number]  A.  Breast, right, ultrasound-guided core biopsy:        -  Invasive lobular carcinoma, [place] grade 1 (of 3), classic type.        -  Lobular carcinoma in situ, classic and type.   Estrogen Receptor (ER) Status: Positive (%, strong)  Progesterone Receptor (PgR) Status: Positive (%, strong)  HER2 by Immunohistochemistry (IHC): Negative (Score 1+)   Ki-67 - 19%    8/16/23 - Bilateral breast MRI  1. Biopsy-proven invasive lobular  carcinoma in the 6-7:00 right breast. A focus of enhancement in the 6:00 right breast, 6 cm posterior to the nipple approximately 1 cm posteromedial to the index lesion may represent postbiopsy change versus additional site of disease. No MR evidence of malignancy in the left breast.   2. No axillary lymphadenopathy. A portion of the left axilla is obscured by left TSA susceptibility artifact.     9/20/23 - JR-  A.  Breast, right, lumpectomy:  Invasive lobular carcinoma, Britney grade 2 (of 3), measuring 0.8 cm in greatest linear extent, located 1 mm from anterior margin. All surgical margins are negative.     2023 Records needed:  Surgery consult, operative, and progress notes  Medical oncology consult and progress notes    Have you received radiation therapy in the past? Yes 2007 at [facility]    Records Location: Care Everywhere, Media, and See Bookmarked material     Records Needed:   All breast imaging for past 5 years  2007 Records needed  Surgery consult, operative, and progress notes  Medical oncology consult and progress notes  Radiation oncology consult and progress notes  Radiation therapy summary  2023 Records needed:  Surgery consult, operative, and progress notes  Medical oncology consult and progress notes    9/29 11:51 - Telephoned patient at [phone] with no answer and no voice message. Phone just rang. Then telephoned secondary number at [phone]  and left voice message for patient requesting she call New Patient Scheduling at [phone].   Writer received referral, reviewed for appropriate plan, and referral transferred to New Patient Scheduling for completion.

## 2023-09-29 ENCOUNTER — PRE VISIT (OUTPATIENT)
Dept: ONCOLOGY | Facility: CLINIC | Age: 88
End: 2023-09-29
Payer: MEDICARE

## 2023-09-29 NOTE — TELEPHONE ENCOUNTER
Referring Provider/Location:  Agustin Peguero MD     Dx and Code: C50.919 (ICD-10-CM) - Malignant neoplasm of female breast, unspecified estrogen receptor status, unspecified laterality, unspecified site of breast (H)   Appt Date:  10.23.23  Provider:  Lul  Imaging Received  October 20, 2023    10:32 AM  CECILY   Action: Images from [place] received and resolved to PACS.   Call to Melissa in film room to resolve mammos     Action Taken  Date/Description  CECILY      October 20, 2023  9:51 AM   Call to Christine @ [place] for update on images.  She is pushing now.  10:27 AM  Email to HIM for status of records request.     Records Requested     Clinic name Comments/Action Taken   [place] October 17, 2023 2:49 PM   Faxed request for all imaging back to 2007    [place] October 17, 2023 2:49 PM    Faxed and emailed request for pathology slides Case:  JR- and Case:  [id number] Tracking #:  [id number]     Records Requested     Clinic name Comments/Action Taken   [facility] October 13, 2023 12:34 PM    Emailed request for all historical records going back to 2007   [facility] October 13, 2023 12:34 PM    Emailed request for all historical Rad Onc Tx records going back to 2007     Action Taken  Date/Description  CECILY     N Navigator September 29, 2023  7:20 AM   2007 Records needed:  Surgery consult, operative, and progress notes  Medical oncology consult and progress notes  Radiation oncology consult and progress notes  Radiation therapy summary  2023 Records needed:  Surgery consult, operative, and progress notes  Medical oncology consult and progress notes    October 13, 2023  Call to Pt to gather records info and 2007. Original Dx and Needle Bx at  and then transferred to [place] where she had Sx (Lumpectomy).  Saw an Oncologist one time and then Xferred back to  for Radiation.  The 5 years after the radiation she went twice a year to [place] for check up and mammos. The 10-12 years was under the supervision of a  PA with a yearly Mammo -also at [facility] until the annual check up in July 2023.  Now she is back at  with this new Dx     RECORDS STATUS - BREAST    RECORDS REQUESTED FROM: Alicia Paula   DATE REQUESTED: CE   NOTES DETAILS STATUS   OFFICE NOTE from referring provider     OFFICE NOTE from medical oncologist     OFFICE NOTE from surgeon     OFFICE NOTE from radiation oncologist     DISCHARGE SUMMARY from hospital     DISCHARGE REPORT from the ER     OPERATIVE REPORT     MEDICATION LIST     CLINICAL TRIAL TREATMENTS TO DATE     LABS     REQUEST BLOCKS FOR ALL BREAST CANCER PTS     PATHOLOGY REPORTS  (Tissue diagnosis, Stage, ER/WV percentage positive and intensity of staining, HER2 IHC, FISH, and all biopsies from breast and any distant metastasis)                     GENONOMIC TESTING     TYPE:   (Next Generation Sequencing, including Foundation One testing, and Oncotype score) 2013 Genetic Testing - [facility]  In Epic    IMAGING (NEED IMAGES & REPORT) *REQUESTED ALL** [place]   CT SCANS     MRI     MAMMO     ULTRASOUND     PET     BONE SCAN     BRAIN MRI

## 2023-10-20 NOTE — TELEPHONE ENCOUNTER
Action October 20, 2023 1:05 PM ABT   Action Taken Slides from [place] received and taken to 5th floor path lab for review.    09/20/23: JR-  08/07/23: [id number]

## 2023-10-23 ENCOUNTER — ONCOLOGY VISIT (OUTPATIENT)
Dept: ONCOLOGY | Facility: CLINIC | Age: 88
End: 2023-10-23
Attending: INTERNAL MEDICINE
Payer: MEDICARE

## 2023-10-23 VITALS
OXYGEN SATURATION: 96 % | RESPIRATION RATE: 16 BRPM | TEMPERATURE: 97.9 F | BODY MASS INDEX: 25.81 KG/M2 | SYSTOLIC BLOOD PRESSURE: 157 MMHG | DIASTOLIC BLOOD PRESSURE: 76 MMHG | HEART RATE: 89 BPM | WEIGHT: 148 LBS

## 2023-10-23 DIAGNOSIS — E28.319 ASYMPTOMATIC PREMATURE MENOPAUSE: ICD-10-CM

## 2023-10-23 DIAGNOSIS — C50.919 MALIGNANT NEOPLASM OF FEMALE BREAST, UNSPECIFIED ESTROGEN RECEPTOR STATUS, UNSPECIFIED LATERALITY, UNSPECIFIED SITE OF BREAST (H): ICD-10-CM

## 2023-10-23 DIAGNOSIS — M85.80 OSTEOPENIA, UNSPECIFIED LOCATION: Primary | ICD-10-CM

## 2023-10-23 PROCEDURE — G0463 HOSPITAL OUTPT CLINIC VISIT: HCPCS | Performed by: INTERNAL MEDICINE

## 2023-10-23 PROCEDURE — 99204 OFFICE O/P NEW MOD 45 MIN: CPT | Performed by: INTERNAL MEDICINE

## 2023-10-23 RX ORDER — ANASTROZOLE 1 MG/1
1 TABLET ORAL DAILY
Qty: 90 TABLET | Refills: 3 | Status: SHIPPED | OUTPATIENT
Start: 2023-10-23

## 2023-10-23 ASSESSMENT — PAIN SCALES - GENERAL: PAINLEVEL: NO PAIN (0)

## 2023-10-23 NOTE — PATIENT INSTRUCTIONS
Continue anastrazole.  Bone density in next few weeks.  Continue prolia at Dr. Peguero's office.  Continue calcium and vitamin D twice a day.  See me in 6 months.  Recheck BP.

## 2023-10-23 NOTE — LETTER
"    10/23/2023         RE: Aysha Rose  [address] [facility] [address]        Dear Colleague,    Thank you for referring your patient, Aysha Rose, to the [facility]. Please see a copy of my visit note below.    Oncology Rooming Note    October 23, 2023 1:07 PM   Aysha Rose is a 87 year old female who presents for:    Chief Complaint   Patient presents with     Oncology Clinic Visit     Initial Vitals: BP (!) 170/70   Pulse 89   Temp 97.9  F (36.6  C) (Oral)   Resp 16   Wt 67.1 kg (148 lb)   SpO2 96%   BMI 25.81 kg/m   Estimated body mass index is 25.81 kg/m  as calculated from the following:    Height as of 9/28/23: 1.613 m (5' 3.5\").    Weight as of this encounter: 67.1 kg (148 lb). Body surface area is 1.73 meters squared.  No Pain (0) Comment: Data Unavailable   No LMP recorded. Patient is postmenopausal.  Allergies reviewed: Yes  Medications reviewed: Yes    Medications: Medication refills not needed today.  Pharmacy name entered into Kymab: [facility] #[id number] [place], [place] - [address] AT [facility] [place] [facility] [address]    Clinical concerns:   doctor was notified.      Aleah Trevizo, [facility]              This consult has been requested by Dr. Agustin Peguero for breast cancer.    Mrs. Rose is a 87-year-old female with breast cancer.  Her investigations are reviewed and summarized below.  1.  In 2007, patient had left breast invasive ductal carcinoma.  ER positive, CT positive and HER2/frida negative.  She had lumpectomy.  She had stage I disease.  She received adjuvant radiation.  She took anastrozole for 5 years.  2.  Screening mammogram on 07/28/2023 revealed abnormality in right breast.  -Diagnostic mammogram and ultrasound on 08/03/2023 revealed 6 mm hypoechoic mass at 6 o'clock position in right breast.  No axillary lymphadenopathy.  -Right breast biopsy on 08/04/2023 revealed grade 1 invasive lobular carcinoma and lobular carcinoma in situ.  ER " positive (%), ND positive and HER2/frida negative (IHC of 1+)  -On 09/20/2023, she had wide local excision of right breast with seed localization.  Pathology reveals invasive lobular carcinoma, grade 2.  0.8 cm.  Margins are negative.  No lymphovascular invasion. pT1b pNx.  3.  Anastrozole started in October 2023.  -No adjuvant radiation recommended.    Patient has been following up at [facility].  For convenience, she would like to transfer her care to us.    Patient was seen by medical oncologist at [facility].  Given her age, no adjuvant radiation recommended.  She was started on anastrozole.  She is tolerating anastrozole well.    Review of system  No excessive fatigue.  No headache.  No dizziness.  No chest pain.  No shortness of breath.  No abdominal pain.  No nausea or vomiting.  No urinary or bowel complaint.  No bone pain.  Some hot flashes.  All other review of system is negative.    Allergies: Reviewed.    Medications: Reviewed.    Past medical history:  -Migraine  -Left breast cancer in 2007.  -Osteopenia  -Hypertension  -Hyperlipidemia  -Anemia  -Shoulder replacement  -Cataract surgery  -Tonsillectomy    Social history:  -No smoking.  -No alcohol use.    Exam:  She is alert oriented x3.  Not in any distress.  Vitals: Reviewed.  Rest of the system is not examined.    Labs: CBC and CMP done on 09/25/2023 reviewed.    Assessment:  1.  A 87-year-old female with clinical stage I (pT1b pNx ) right breast invasive lobular carcinoma.  ER positive and HER2/frida negative.  2.  Left breast invasive ductal carcinoma in 2007 status post lumpectomy, radiation and anastrozole.  3.  Osteopenia.  4.  Other medical problems as described above.    Recommendation:  Continue anastrazole.  Bone density in next few weeks.  Continue prolia at Dr. Peguero's office.  Continue calcium and vitamin D twice a day.  See me in 6 months.    Discussion:  1.  Patient has left breast invasive ductal carcinoma.   She had lumpectomy.  ER positive and HER2/frida negative.    We discussed regarding post-lumpectomy radiation.  Patient is 87-year-old.  Risk of recurrence is very low.  I agree with [facility]'s recommendation to omit radiation.  Explained to the patient that at her age it is not going to improve overall survival.    Patient is on anastrozole.  Overall she is tolerating it well.  Plan will be to give it for minimum of 5 years.  She is agreeable for it.    She will continue with yearly mammogram.    2.  Patient has osteopenia.  She is on calcium and vitamin D twice a day.  She also been getting Prolia since 2019.    Explained to her that osteopenia will get worse with anastrozole.  We will check a bone density.  If there is worsening, we can consider switching anastrozole to tamoxifen.    3.  She had few questions which were all answered.  I reassured the patient that she will do well from breast cancer.  I will see her in 6 months time.  No routine labs or imaging studies will be done.  Advised her to call us with any questions or concerns.    Thanks for the consult.    Total visit time of 55 minutes.  Time spent in today's visit, review of chart/investigations today and documentation today.          Again, thank you for allowing me to participate in the care of your patient.        Sincerely,        Chelsea Mccarty MD

## 2023-10-23 NOTE — LETTER
"    10/23/2023         RE: Aysha Rose  [address] [facility] [address]      Oncology Rooming Note    October 23, 2023 1:07 PM   Aysha Rose is a 87 year old female who presents for:    Chief Complaint   Patient presents with     Oncology Clinic Visit     Initial Vitals: BP (!) 170/70   Pulse 89   Temp 97.9  F (36.6  C) (Oral)   Resp 16   Wt 67.1 kg (148 lb)   SpO2 96%   BMI 25.81 kg/m   Estimated body mass index is 25.81 kg/m  as calculated from the following:    Height as of 9/28/23: 1.613 m (5' 3.5\").    Weight as of this encounter: 67.1 kg (148 lb). Body surface area is 1.73 meters squared.  No Pain (0) Comment: Data Unavailable   No LMP recorded. Patient is postmenopausal.  Allergies reviewed: Yes  Medications reviewed: Yes    Medications: Medication refills not needed today.  Pharmacy name entered into SocialWire: [id number] [facility] [address] [place] [facility] [place] [address]    Clinical concerns:   doctor was notified.      Aleah Trevizo, [facility]              This consult has been requested by Dr. Agustin Peguero for breast cancer.    Mrs. Rose is a 87-year-old female with breast cancer.  Her investigations are reviewed and summarized below.  1.  In 2007, patient had left breast invasive ductal carcinoma.  ER positive, MN positive and HER2/frida negative.  She had lumpectomy.  She had stage I disease.  She received adjuvant radiation.  She took anastrozole for 5 years.  2.  Screening mammogram on 07/28/2023 revealed abnormality in right breast.  -Diagnostic mammogram and ultrasound on 08/03/2023 revealed 6 mm hypoechoic mass at 6 o'clock position in right breast.  No axillary lymphadenopathy.  -Right breast biopsy on 08/04/2023 revealed grade 1 invasive lobular carcinoma and lobular carcinoma in situ.  ER positive (%), MN positive and HER2/frida negative (IHC of 1+)  -On 09/20/2023, she had wide local excision of right breast with seed localization.  Pathology " reveals invasive lobular carcinoma, grade 2.  0.8 cm.  Margins are negative.  No lymphovascular invasion. pT1b pNx.  3.  Anastrozole started in October 2023.  -No adjuvant radiation recommended.    Patient has been following up at [facility].  For convenience, she would like to transfer her care to us.    Patient was seen by medical oncologist at [facility].  Given her age, no adjuvant radiation recommended.  She was started on anastrozole.  She is tolerating anastrozole well.    Review of system  No excessive fatigue.  No headache.  No dizziness.  No chest pain.  No shortness of breath.  No abdominal pain.  No nausea or vomiting.  No urinary or bowel complaint.  No bone pain.  Some hot flashes.  All other review of system is negative.    Allergies: Reviewed.    Medications: Reviewed.    Past medical history:  -Migraine  -Left breast cancer in 2007.  -Osteopenia  -Hypertension  -Hyperlipidemia  -Anemia  -Shoulder replacement  -Cataract surgery  -Tonsillectomy    Social history:  -No smoking.  -No alcohol use.    Exam:  She is alert oriented x3.  Not in any distress.  Vitals: Reviewed.  Rest of the system is not examined.    Labs: CBC and CMP done on 09/25/2023 reviewed.    Assessment:  1.  A 87-year-old female with clinical stage I (pT1b pNx ) right breast invasive lobular carcinoma.  ER positive and HER2/firda negative.  2.  Left breast invasive ductal carcinoma in 2007 status post lumpectomy, radiation and anastrozole.  3.  Osteopenia.  4.  Other medical problems as described above.    Recommendation:  Continue anastrazole.  Bone density in next few weeks.  Continue prolia at Dr. Peguero's office.  Continue calcium and vitamin D twice a day.  See me in 6 months.    Discussion:  1.  Patient has left breast invasive ductal carcinoma.  She had lumpectomy.  ER positive and HER2/frida negative.    We discussed regarding post-lumpectomy radiation.  Patient is 87-year-old.  Risk of recurrence is very  low.  I agree with [facility]'s recommendation to omit radiation.  Explained to the patient that at her age it is not going to improve overall survival.    Patient is on anastrozole.  Overall she is tolerating it well.  Plan will be to give it for minimum of 5 years.  She is agreeable for it.    She will continue with yearly mammogram.    2.  Patient has osteopenia.  She is on calcium and vitamin D twice a day.  She also been getting Prolia since 2019.    Explained to her that osteopenia will get worse with anastrozole.  We will check a bone density.  If there is worsening, we can consider switching anastrozole to tamoxifen.    3.  She had few questions which were all answered.  I reassured the patient that she will do well from breast cancer.  I will see her in 6 months time.  No routine labs or imaging studies will be done.  Advised her to call us with any questions or concerns.    Thanks for the consult.    Total visit time of 55 minutes.  Time spent in today's visit, review of chart/investigations today and documentation today.            Chelsea Mccarty MD

## 2023-10-23 NOTE — PROGRESS NOTES
This consult has been requested by Dr. Agustin Peguero for breast cancer.    Mrs. Rose is a 87-year-old female with breast cancer.  Her investigations are reviewed and summarized below.  1.  In 2007, patient had left breast invasive ductal carcinoma.  ER positive, MN positive and HER2/frida negative.  She had lumpectomy.  She had stage I disease.  She received adjuvant radiation.  She took anastrozole for 5 years.  2.  Screening mammogram on 07/28/2023 revealed abnormality in right breast.  -Diagnostic mammogram and ultrasound on 08/03/2023 revealed 6 mm hypoechoic mass at 6 o'clock position in right breast.  No axillary lymphadenopathy.  -Right breast biopsy on 08/04/2023 revealed grade 1 invasive lobular carcinoma and lobular carcinoma in situ.  ER positive (%), MN positive and HER2/frida negative (IHC of 1+)  -On 09/20/2023, she had wide local excision of right breast with seed localization.  Pathology reveals invasive lobular carcinoma, grade 2.  0.8 cm.  Margins are negative.  No lymphovascular invasion. pT1b pNx.  3.  Anastrozole started in October 2023.  -No adjuvant radiation recommended.    Patient has been following up at [facility].  For convenience, she would like to transfer her care to us.    Patient was seen by medical oncologist at [facility].  Given her age, no adjuvant radiation recommended.  She was started on anastrozole.  She is tolerating anastrozole well.    Review of system  No excessive fatigue.  No headache.  No dizziness.  No chest pain.  No shortness of breath.  No abdominal pain.  No nausea or vomiting.  No urinary or bowel complaint.  No bone pain.  Some hot flashes.  All other review of system is negative.    Allergies: Reviewed.    Medications: Reviewed.    Past medical history:  -Migraine  -Left breast cancer in 2007.  -Osteopenia  -Hypertension  -Hyperlipidemia  -Anemia  -Shoulder replacement  -Cataract surgery  -Tonsillectomy    Social history:  -No  smoking.  -No alcohol use.    Exam:  She is alert oriented x3.  Not in any distress.  Vitals: Reviewed.  Rest of the system is not examined.    Labs: CBC and CMP done on 09/25/2023 reviewed.    Assessment:  1.  A 87-year-old female with clinical stage I (pT1b pNx ) right breast invasive lobular carcinoma.  ER positive and HER2/frida negative.  2.  Left breast invasive ductal carcinoma in 2007 status post lumpectomy, radiation and anastrozole.  3.  Osteopenia.  4.  Other medical problems as described above.    Recommendation:  Continue anastrazole.  Bone density in next few weeks.  Continue prolia at Dr. Peguero's office.  Continue calcium and vitamin D twice a day.  See me in 6 months.    Discussion:  1.  Patient has left breast invasive ductal carcinoma.  She had lumpectomy.  ER positive and HER2/frida negative.    We discussed regarding post-lumpectomy radiation.  Patient is 87-year-old.  Risk of recurrence is very low.  I agree with [facility]'s recommendation to omit radiation.  Explained to the patient that at her age it is not going to improve overall survival.    Patient is on anastrozole.  Overall she is tolerating it well.  Plan will be to give it for minimum of 5 years.  She is agreeable for it.    She will continue with yearly mammogram.    2.  Patient has osteopenia.  She is on calcium and vitamin D twice a day.  She also been getting Prolia since 2019.    Explained to her that osteopenia will get worse with anastrozole.  We will check a bone density.  If there is worsening, we can consider switching anastrozole to tamoxifen.    3.  She had few questions which were all answered.  I reassured the patient that she will do well from breast cancer.  I will see her in 6 months time.  No routine labs or imaging studies will be done.  Advised her to call us with any questions or concerns.    Thanks for the consult.    Total visit time of 55 minutes.  Time spent in today's visit, review of chart/investigations  today and documentation today.

## 2023-10-23 NOTE — PROGRESS NOTES
"Oncology Rooming Note    October 23, 2023 1:07 PM   Aysha Rose is a 87 year old female who presents for:    Chief Complaint   Patient presents with    Oncology Clinic Visit     Initial Vitals: BP (!) 170/70   Pulse 89   Temp 97.9  F (36.6  C) (Oral)   Resp 16   Wt 67.1 kg (148 lb)   SpO2 96%   BMI 25.81 kg/m   Estimated body mass index is 25.81 kg/m  as calculated from the following:    Height as of 9/28/23: 1.613 m (5' 3.5\").    Weight as of this encounter: 67.1 kg (148 lb). Body surface area is 1.73 meters squared.  No Pain (0) Comment: Data Unavailable   No LMP recorded. Patient is postmenopausal.  Allergies reviewed: Yes  Medications reviewed: Yes    Medications: Medication refills not needed today.  Pharmacy name entered into Remind: [facility] #[id number] - [place] - [address] AT [facility] [address]    Clinical concerns:   doctor was notified.      Aleah Trevizo CMA            "

## 2023-10-24 ENCOUNTER — LAB REQUISITION (OUTPATIENT)
Dept: LAB | Facility: CLINIC | Age: 88
End: 2023-10-24
Payer: MEDICARE

## 2023-10-24 PROCEDURE — 88321 CONSLTJ&REPRT SLD PREP ELSWR: CPT | Mod: GC | Performed by: PATHOLOGY

## 2023-11-03 LAB
PATH REPORT.COMMENTS IMP SPEC: ABNORMAL
PATH REPORT.COMMENTS IMP SPEC: YES
PATH REPORT.FINAL DX SPEC: ABNORMAL
PATH REPORT.GROSS SPEC: ABNORMAL
PATH REPORT.MICROSCOPIC SPEC OTHER STN: ABNORMAL
PATH REPORT.RELEVANT HX SPEC: ABNORMAL
PATH REPORT.RELEVANT HX SPEC: ABNORMAL
PATH REPORT.SITE OF ORIGIN SPEC: ABNORMAL

## 2023-11-15 ENCOUNTER — HOSPITAL ENCOUNTER (OUTPATIENT)
Dept: BONE DENSITY | Facility: CLINIC | Age: 88
Discharge: HOME | End: 2023-11-15
Attending: INTERNAL MEDICINE | Admitting: INTERNAL MEDICINE
Payer: MEDICARE

## 2023-11-15 DIAGNOSIS — E28.319 ASYMPTOMATIC PREMATURE MENOPAUSE: ICD-10-CM

## 2023-11-15 DIAGNOSIS — M85.80 OSTEOPENIA, UNSPECIFIED LOCATION: ICD-10-CM

## 2023-11-15 PROCEDURE — 77080 DXA BONE DENSITY AXIAL: CPT

## 2023-11-17 NOTE — RESULT ENCOUNTER NOTE
Dear Ms. Maloneon,    Bone density reveals osteopenia. It has improved. Prolia is helping.    Please, call me with any questions.    Chelsea Mccarty MD

## 2024-02-12 ENCOUNTER — NURSE TRIAGE (OUTPATIENT)
Dept: FAMILY MEDICINE | Facility: CLINIC | Age: 89
End: 2024-02-12
Payer: MEDICARE

## 2024-02-12 NOTE — TELEPHONE ENCOUNTER
Tasha Rose  P Cs Triage Im (supporting Agustin Peguero MD)2 hours ago (12:27 PM)     ALBA  Hi Dr. Peguero,   In December I sent you the results of several months of blood pressure checks which I thought were high.  You responded not to be concerned.  I checked again last week and the numbers aren't coming down, in fact going up-  168/80.  I'm concerned about a stroke.  Do I need to see you or change my medications?  Thank you.   Tasha Rose      Patient Contact    Attempt # 1    Was call answered?  Yes but pt not home. No C2C on file. Left message with person answering with information to call back.    Maria Isabel TAYLOR, Triage RN  [facility]

## 2024-02-12 NOTE — TELEPHONE ENCOUNTER
"Patient returning call to triage regarding blood pressure.     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 Patient states \"I always go there for my blood pressure\"  HISTORY: hx of HTN   MEDICINES: amlodipine 2.5 mg, irbesartan-hydrochlorothiazide 12.5 mg   OTHER SYMPTOMS: denies chest pain, difficulty breathing, weakness, blurred vision, headache     Patient states \"I just feel a little tense\"    Patient does have an automatic cuff at home but does not know how to use.     Triaged per [facility] protocol, patient to be seen in office within 3 days. Assisted with scheduling team appt:    2/13/2024 1:30 PM (Arrive by 1:10 PM) Afia Mary PA-C [facility]     Did advise that patient monitor BP at home today as well and bring readings to appt. Patient will have her  help her work the machine. If any further elevated readings patient instructed to callback to triage, patient is agreeable.     Also reviewed red flag symptoms that warrant emergent evaluation, chest pain, breathing difficulty, blurred vision etc with elevated BP readings. Advised if this were to occur to go to ED immediately, patient expressed verbal understanding and is agreeable.    Signing encounter.     Tracey Reyes RN  [employer]    Reason for Disposition   Systolic BP >= 160 OR Diastolic >= 100    Additional Information   Negative: Sounds like a life-threatening emergency to the triager   Negative: Symptom is main concern (e.g., headache, chest pain)   Negative: Low blood pressure is main concern   Negative: Systolic BP >= 160 OR Diastolic >= 100, and any cardiac (e.g., breathing difficulty, chest pain) or neurologic symptoms (e.g., new-onset blurred or double vision)   Negative: Pregnant 20 or more weeks (or postpartum < 6 weeks) with new hand or face swelling   " Negative: Pregnant 20 or more weeks (or postpartum < 6 weeks) and Systolic BP >= 160 OR Diastolic >= 110   Negative: Patient sounds very sick or weak to the triager   Negative: Systolic BP >= 200 OR Diastolic >= 120 and having NO cardiac or neurologic symptoms   Negative: Pregnant 20 or more weeks (or postpartum < 6 weeks) with Systolic BP >= 140 OR Diastolic >= 90   Negative: Systolic BP >= 180 OR Diastolic >= 110, and missed most recent dose of blood pressure medication   Negative: Systolic BP >= 180 OR Diastolic >= 110   Negative: Patient wants to be seen   Negative: Ran out of BP medications   Negative: Taking BP medications and feels is having side effects (e.g., impotence, cough, dizziness)    Protocols used: Blood Pressure - High-A-OH

## 2024-02-13 ENCOUNTER — OFFICE VISIT (OUTPATIENT)
Dept: FAMILY MEDICINE | Facility: CLINIC | Age: 89
End: 2024-02-13
Payer: MEDICARE

## 2024-02-13 VITALS
TEMPERATURE: 97.7 F | BODY MASS INDEX: 25.44 KG/M2 | DIASTOLIC BLOOD PRESSURE: 76 MMHG | WEIGHT: 149 LBS | OXYGEN SATURATION: 95 % | HEART RATE: 99 BPM | SYSTOLIC BLOOD PRESSURE: 156 MMHG | HEIGHT: 64 IN | RESPIRATION RATE: 16 BRPM

## 2024-02-13 DIAGNOSIS — C50.919 MALIGNANT NEOPLASM OF FEMALE BREAST, UNSPECIFIED ESTROGEN RECEPTOR STATUS, UNSPECIFIED LATERALITY, UNSPECIFIED SITE OF BREAST (H): ICD-10-CM

## 2024-02-13 DIAGNOSIS — I10 BENIGN ESSENTIAL HYPERTENSION: Primary | ICD-10-CM

## 2024-02-13 DIAGNOSIS — F41.9 ANXIETY: ICD-10-CM

## 2024-02-13 PROCEDURE — 99213 OFFICE O/P EST LOW 20 MIN: CPT | Performed by: INTERNAL MEDICINE

## 2024-02-13 RX ORDER — AMLODIPINE BESYLATE 5 MG/1
5 TABLET ORAL DAILY
Qty: 90 TABLET | Refills: 3 | Status: SHIPPED | OUTPATIENT
Start: 2024-02-13 | End: 2024-10-07

## 2024-02-13 ASSESSMENT — PAIN SCALES - GENERAL: PAINLEVEL: NO PAIN (0)

## 2024-02-13 NOTE — PATIENT INSTRUCTIONS
Change the dose of the amlodipine to 5mg once daily, continue all the other medicines the same.    Send me a MyStream message in 4 weeks with the blood pressure readings.  If you can check your blood pressure 3x a week always after sitting for 5 minutes that would be great.    If your anxiety worsens please let me know.

## 2024-02-13 NOTE — PROGRESS NOTES
This is a very pleasant 88-year-old here for hypertension.  Patient has a long history of this and takes medication as noted.  No history of renal artery stenosis.  No NSAID use or extra salt.  No caffeine.  She walks regularly.  She has been feeling well but has had some anxiety since her most recent breast cancer diagnosis.  It has been affecting her somewhat.  She otherwise is felt well.  No headaches or dizziness, chest pain or shortness of breath.    Past Medical History:   Diagnosis Date    Abdominal pain 09/2013    ct abd and pelvis neg    Benign essential hypertension     nl mr renogram    Breast cancer (H) 2007    lumpectomy and xrt [facility], got 5 years of arimidex; had 2nd breast ca with lumpectomy 9/2023 [facility]    Erosive gastritis 10/2011    by egd, colon as above, also small ulcer    High cholesterol     Hx of colonoscopy 2007, 2011    nl, 2011 with cecal angioect and 2mm polyp    FRANKLIN (iron deficiency anaemia) 2011, 2016 2011 colonoscopy cecal angioectasia, egd with hh, small ulcer    Invasive lobular carcinoma of breast in female (H) 08/2023    lumpectomy done [facility], right breast, added arimidex, no xrt    Left flank pain 05/2021    ct abd and pelvis nl    Microscopic hematuria 2012    Dr. Alonzo, ct done 11/28/12 negative, cysto neg    Osteopenia 2008    [facility], fu 2014 [facility] and worse -1.5 left hip; fu here 7/18 a bit worse    Osteoporosis 2019    based on low impact fx of left shoulder, added prolia then    Syncope 2011    neg est echo     Past Surgical History:   Procedure Laterality Date    CATARACT IOL, RT/LT      LUMPECTOMY BREAST  2007    lumpectomy right breast  09/2023    [facility], right breast    XR SHOULDER SURGERY WENDY LEFT Left 03/2019    tco     Social History     Socioeconomic History    Marital status:      Spouse name: Not on file    Number of children: 4    Years of education: Not on file    Highest education level: Not on file   Occupational History    Occupation: homemaker   Tobacco  Use    Smoking status: Never    Smokeless tobacco: Never   Vaping Use    Vaping Use: Never used   Substance and Sexual Activity    Alcohol use: No    Drug use: No    Sexual activity: Yes     Partners: Male   Other Topics Concern    Parent/sibling w/ CABG, MI or angioplasty before 65F 55M? Not Asked   Social History Narrative    Not on file     Social Determinants of Health     Financial Resource Strain: Low Risk  (9/21/2023)    Financial Resource Strain     Within the past 12 months, have you or your family members you live with been unable to get utilities (heat, electricity) when it was really needed?: No   Food Insecurity: Low Risk  (9/21/2023)    Food Insecurity     Within the past 12 months, did you worry that your food would run out before you got money to buy more?: No     Within the past 12 months, did the food you bought just not last and you didn t have money to get more?: No   Transportation Needs: Low Risk  (9/21/2023)    Transportation Needs     Within the past 12 months, has lack of transportation kept you from medical appointments, getting your medicines, non-medical meetings or appointments, work, or from getting things that you need?: No   Physical Activity: Not on file   Stress: Not on file   Social Connections: Not on file   Interpersonal Safety: Not on file   Housing Stability: Low Risk  (9/21/2023)    Housing Stability     Do you have housing? : Yes     Are you worried about losing your housing?: No     Current Outpatient Medications   Medication Sig Dispense Refill    acetaminophen (TYLENOL) 500 MG tablet Take 1,000 mg by mouth 4 times daily as needed for mild pain      amLODIPine (NORVASC) 5 MG tablet Take 1 tablet (5 mg) by mouth daily 90 tablet 3    anastrozole (ARIMIDEX) 1 MG tablet Take 1 tablet (1 mg) by mouth daily 90 tablet 3    calcium carbonate 600 mg-vitamin D 400 units (CALTRATE) 600-400 MG-UNIT per tablet Take 2 tablets by mouth every evening       cephALEXin (KEFLEX) 500 MG  "capsule TK 4 CS PO 30 TO 60 MINUTES PRIOR TO DENTAL WORK  2    denosumab (PROLIA) 60 MG/ML SOSY injection Inject 1 mL (60 mg) Subcutaneous every 6 months 1 mL 1    fish oil-omega-3 fatty acids 1000 MG capsule Take 2 g by mouth daily      irbesartan-hydrochlorothiazide (AVALIDE) 150-12.5 MG tablet Take 1 tablet by mouth daily 90 tablet 3    pantoprazole (PROTONIX) 40 MG EC tablet Take 1 tablet (40 mg) by mouth daily 90 tablet 3    polyethylene glycol (MIRALAX/GLYCOLAX) powder Take 1 capful by mouth daily as needed (While taking Norco)      Psyllium (METAMUCIL FREE & NATURAL PO) Take 3 Tablespoonful by mouth every evening      simvastatin (ZOCOR) 20 MG tablet TAKE 1 TABLET(20 MG) BY MOUTH EVERY EVENING 90 tablet 3    vitamin C (ASCORBIC ACID) 500 MG tablet Take 500 mg by mouth every other day       No Known Allergies  FAMILY HISTORY NOTED AND REVIEWED    REVIEW OF SYSTEMS: above    PHYSICAL EXAM    BP (!) 156/76   Pulse 99   Temp 97.7  F (36.5  C) (Temporal)   Resp 16   Ht 1.613 m (5' 3.5\")   Wt 67.6 kg (149 lb)   SpO2 95%   BMI 25.98 kg/m      Patient appears non toxic  Lungs clear  Cv reglar rate and rhythm, no murmer, rub or gallop, no jvp or edema    Prior labs noted    ASSESSMENT:  Hypertension, control worse, doubt secondary cause except for aging.  Will raise the dose of amlodipine and if this does not do it we can increase the dose of her irbesartan/hydrochlorothiazide.  I think at her age blood pressure under 150 is acceptable.  Anxiety, some of it relates to the blood pressure as well so we will see if treating this helps.  If not consider SSRI  Breast cancer, follow-up oncology    PLAN:  Change norvasc to 5mg  Send me readings in 4 weeks    Agustin Peguero M.D.        "

## 2024-02-29 ENCOUNTER — MYC MEDICAL ADVICE (OUTPATIENT)
Dept: FAMILY MEDICINE | Facility: CLINIC | Age: 89
End: 2024-02-29
Payer: MEDICARE

## 2024-02-29 NOTE — TELEPHONE ENCOUNTER
Abstracted: Covid vaccine Pfizer and Flu shot on October 3rd and RSV Abrysvo on October 25th, 2023 per [facility].

## 2024-03-08 ENCOUNTER — ALLIED HEALTH/NURSE VISIT (OUTPATIENT)
Dept: NURSING | Facility: CLINIC | Age: 89
End: 2024-03-08
Payer: MEDICARE

## 2024-03-08 DIAGNOSIS — M81.0 AGE-RELATED OSTEOPOROSIS WITHOUT CURRENT PATHOLOGICAL FRACTURE: Primary | ICD-10-CM

## 2024-03-08 PROCEDURE — 96372 THER/PROPH/DIAG INJ SC/IM: CPT | Performed by: INTERNAL MEDICINE

## 2024-03-08 PROCEDURE — 99207 PR NO CHARGE NURSE ONLY: CPT

## 2024-03-08 NOTE — PROGRESS NOTES
Clinic Administered Medication Documentation    Prolia Documentation    Indication: Prolia  (denosumab) is a prescription medicine used to treat osteoporosis in patients who:   Are at high risk for fracture, meaning patients who have had a fracture related to osteoporosis, or who have multiple risk factors for fracture.  Cannot use another osteoporosis medicine or other osteoporosis medicines did not work well.  The timeline for early/late injections would be 4 weeks early and any time after the 6 month maria dolores. If a patient receives their injection late, then the subsequent injection would be 6 months from the date that they actually received the injection.    When was the last injection?  23  Was the last injection at least 6 months ago? Yes  Has the prior authorization been completed?  Yes  Is there an active order (written within the past 365 days, with administrations remaining, not ) in the chart?  Yes  Patient denies any dental work involving the bone (e.g. tooth extraction or dental implants) in the past 4 weeks?  Yes  Patient denies plans for any dental work involving the bone (e.g. tooth extraction or dental implants) in the next 4 weeks? Yes    The following steps were completed to comply with the REMS program for Prolia:  Reviewed information in the Medication Guide and Patient Counseling Chart, including the serious risks of Prolia  and the symptoms of each risk.  Advised patient to seek prompt medical attention if they have signs or symptoms of any of the serious risks.  Provided each patient a copy of the Medication Guide and Patient Brochure.    Prior to injection, verified patient identity using patient's name and date of birth. Medication was administered. Please see MAR and medication order for additional information. Patient instructed to remain in clinic for 15 minutes and report any adverse reaction to staff immediately.    Vial/Syringe: Syringe  Was this medication supplied by the  patient? No  Verified that the patient has refills remaining in their prescription.    Tracey Reyes RN  [facility]

## 2024-04-23 ENCOUNTER — ONCOLOGY VISIT (OUTPATIENT)
Dept: ONCOLOGY | Facility: CLINIC | Age: 89
End: 2024-04-23
Attending: INTERNAL MEDICINE
Payer: MEDICARE

## 2024-04-23 VITALS
BODY MASS INDEX: 26.15 KG/M2 | HEART RATE: 85 BPM | DIASTOLIC BLOOD PRESSURE: 72 MMHG | RESPIRATION RATE: 16 BRPM | OXYGEN SATURATION: 97 % | WEIGHT: 150 LBS | SYSTOLIC BLOOD PRESSURE: 151 MMHG

## 2024-04-23 DIAGNOSIS — C50.919 MALIGNANT NEOPLASM OF FEMALE BREAST, UNSPECIFIED ESTROGEN RECEPTOR STATUS, UNSPECIFIED LATERALITY, UNSPECIFIED SITE OF BREAST (H): Primary | ICD-10-CM

## 2024-04-23 DIAGNOSIS — Z12.31 ENCOUNTER FOR SCREENING MAMMOGRAM FOR MALIGNANT NEOPLASM OF BREAST: ICD-10-CM

## 2024-04-23 PROCEDURE — 99214 OFFICE O/P EST MOD 30 MIN: CPT | Performed by: INTERNAL MEDICINE

## 2024-04-23 PROCEDURE — G0463 HOSPITAL OUTPT CLINIC VISIT: HCPCS | Performed by: INTERNAL MEDICINE

## 2024-04-23 ASSESSMENT — PAIN SCALES - GENERAL: PAINLEVEL: NO PAIN (0)

## 2024-04-23 NOTE — LETTER
4/23/2024         RE: Aysha Rose  [address] [facility] [address]        Dear Colleague,    Thank you for referring your patient, Aysha Rose, to the [facility]. Please see a copy of my visit note below.    ONCOLOGY HISTORY: Mrs. Rose is a female with breast cancer.    1.  In 2007, patient had left breast invasive ductal carcinoma.  ER positive, NJ positive and HER2/frida negative.  She had lumpectomy.  She had stage I disease.  She received adjuvant radiation.  She took anastrozole for 5 years.    2.  Screening mammogram on 07/28/2023 revealed abnormality in right breast.  -Diagnostic mammogram and ultrasound on 08/03/2023 revealed 6 mm hypoechoic mass at 6 o'clock position in right breast.  No axillary lymphadenopathy.  -Right breast biopsy on 08/04/2023 revealed grade 1 invasive lobular carcinoma and lobular carcinoma in situ.  ER positive (%), NJ positive and HER2/frida negative (IHC of 1+)  -On 09/20/2023, she had wide local excision of right breast with seed localization.  Pathology reveals invasive lobular carcinoma, grade 2.  0.8 cm.  Margins are negative.  No lymphovascular invasion. pT1b pNx.    3.  Anastrozole started in October 2023.  -No adjuvant radiation recommended.    4.  Bone density on 11/15/2023 reveals osteopenia. Mild improvement since 06/2018.     Subjective:   Ms. Rose is a 88-year-old female with breast cancer.  In 2007, she had a stage I left breast cancer treated with lumpectomy, radiation and 5 years of anastrozole.    In 2023, patient diagnosed with clinical stage 1 right breast invasive lobular carcinoma.  ER positive and HER2/frida negative.  She had lumpectomy.  No radiation given because of her old age.  She is on anastrozole.  She is doing well.    Patient is doing well for her age.  She has mild generalized weakness.  She is able to do all her activities.  She gets anxiety. No headache.  No dizziness.  No chest pain.  No shortness  of breath.  No abdominal pain.  No nausea or vomiting.  No urinary or bowel complaint.  No bone pain.  Occasional hot flashes.  All other review of system is negative.     PHYSICAL EXAMINATION:   GENERAL:  Alert and oriented x 3.   VITAL SIGNS:  Reviewed.  ECOG PS of 1.     EYES:  No icterus.   NECK:  Supple. No lymphadenopathy. No thyromegaly.   AXILLAE:  No lymphadenopathy.   LUNGS:  Good air entry bilaterally.  No crackles or wheezing.   HEART:  Regular.  No murmur.   GI: Abdomen is soft.  Nontender. No mass.   EXTREMITIES:  No pedal edema.  No calf swelling or tenderness.   SKIN:  No rash.      Assessment:  1.  An 88-year-old female with clinical stage I (pT1b pNx ) right breast invasive lobular carcinoma diagnosed in 2023.  ER positive and HER2/frida negative. No evidence of recurrence.  2.  Left breast invasive ductal carcinoma in 2007 status post lumpectomy, radiation and anastrozole. No evidence of recurrence.  3.  Osteopenia.    Plan:  Continue anastrazole.  Continue calcium and vitamin D twice a day.  Continue prolia at PCP office.  Mammogram in early August, 2024.  See me in 6 months.     Discussion:  1.  Patient is doing well from breast cancer.  No evidence of recurrence.    She is on anastrozole.  Overall she is tolerating it well.  She will continue on it.    She will get her next mammogram in early August.    2.  Patient has osteopenia. It can get worse with anastrozole.  She is on Prolia every 6 months which will be continued.  She will also continue on calcium and vitamin D twice a day.  She is tolerating Prolia well.  No dental or jaw related symptoms.  Last bone density in November revealed mild improvement in osteopenia.    3.  She had few questions which were all answered.  I will see her in 6 months time.  Advised her to call us with any questions or concerns.    4.  Blood pressure is elevated.  Patient says that she gets anxious.  She will have her blood pressure monitored through her  PCP.    Total visit time of 30 minutes.  Time spent in today's visit, review of chart/investigations today and documentation today.    Again, thank you for allowing me to participate in the care of your patient.        Sincerely,        Chelsea Mccarty MD

## 2024-04-23 NOTE — PATIENT INSTRUCTIONS
Continue anastrazole.  Continue calcium and vitamin D twice a day.  Continue prolia at PCP office.  Mammogram in early August, 2024.  See me in 6 months.

## 2024-04-28 NOTE — PROGRESS NOTES
ONCOLOGY HISTORY: Mrs. Rose is a female with breast cancer.    1.  In 2007, patient had left breast invasive ductal carcinoma.  ER positive, MN positive and HER2/frida negative.  She had lumpectomy.  She had stage I disease.  She received adjuvant radiation.  She took anastrozole for 5 years.    2.  Screening mammogram on 07/28/2023 revealed abnormality in right breast.  -Diagnostic mammogram and ultrasound on 08/03/2023 revealed 6 mm hypoechoic mass at 6 o'clock position in right breast.  No axillary lymphadenopathy.  -Right breast biopsy on 08/04/2023 revealed grade 1 invasive lobular carcinoma and lobular carcinoma in situ.  ER positive (%), MN positive and HER2/frida negative (IHC of 1+)  -On 09/20/2023, she had wide local excision of right breast with seed localization.  Pathology reveals invasive lobular carcinoma, grade 2.  0.8 cm.  Margins are negative.  No lymphovascular invasion. pT1b pNx.    3.  Anastrozole started in October 2023.  -No adjuvant radiation recommended.    4.  Bone density on 11/15/2023 reveals osteopenia. Mild improvement since 06/2018.     Subjective:   Ms. Rose is a 88-year-old female with breast cancer.  In 2007, she had a stage I left breast cancer treated with lumpectomy, radiation and 5 years of anastrozole.    In 2023, patient diagnosed with clinical stage 1 right breast invasive lobular carcinoma.  ER positive and HER2/frida negative.  She had lumpectomy.  No radiation given because of her old age.  She is on anastrozole.  She is doing well.    Patient is doing well for her age.  She has mild generalized weakness.  She is able to do all her activities.  She gets anxiety. No headache.  No dizziness.  No chest pain.  No shortness of breath.  No abdominal pain.  No nausea or vomiting.  No urinary or bowel complaint.  No bone pain.  Occasional hot flashes.  All other review of system is negative.     PHYSICAL EXAMINATION:   GENERAL:  Alert and oriented x 3.   VITAL SIGNS:  Reviewed.   ECOG PS of 1.     EYES:  No icterus.   NECK:  Supple. No lymphadenopathy. No thyromegaly.   AXILLAE:  No lymphadenopathy.   LUNGS:  Good air entry bilaterally.  No crackles or wheezing.   HEART:  Regular.  No murmur.   GI: Abdomen is soft.  Nontender. No mass.   EXTREMITIES:  No pedal edema.  No calf swelling or tenderness.   SKIN:  No rash.      Assessment:  1.  An 88-year-old female with clinical stage I (pT1b pNx ) right breast invasive lobular carcinoma diagnosed in 2023.  ER positive and HER2/frida negative. No evidence of recurrence.  2.  Left breast invasive ductal carcinoma in 2007 status post lumpectomy, radiation and anastrozole. No evidence of recurrence.  3.  Osteopenia.    Plan:  Continue anastrazole.  Continue calcium and vitamin D twice a day.  Continue prolia at PCP office.  Mammogram in early August, 2024.  See me in 6 months.     Discussion:  1.  Patient is doing well from breast cancer.  No evidence of recurrence.    She is on anastrozole.  Overall she is tolerating it well.  She will continue on it.    She will get her next mammogram in early August.    2.  Patient has osteopenia. It can get worse with anastrozole.  She is on Prolia every 6 months which will be continued.  She will also continue on calcium and vitamin D twice a day.  She is tolerating Prolia well.  No dental or jaw related symptoms.  Last bone density in November revealed mild improvement in osteopenia.    3.  She had few questions which were all answered.  I will see her in 6 months time.  Advised her to call us with any questions or concerns.    4.  Blood pressure is elevated.  Patient says that she gets anxious.  She will have her blood pressure monitored through her PCP.    Total visit time of 30 minutes.  Time spent in today's visit, review of chart/investigations today and documentation today.

## 2024-05-07 ENCOUNTER — MYC MEDICAL ADVICE (OUTPATIENT)
Dept: FAMILY MEDICINE | Facility: CLINIC | Age: 89
End: 2024-05-07
Payer: MEDICARE

## 2024-05-08 ENCOUNTER — OFFICE VISIT (OUTPATIENT)
Dept: FAMILY MEDICINE | Facility: CLINIC | Age: 89
End: 2024-05-08
Payer: MEDICARE

## 2024-05-08 VITALS
DIASTOLIC BLOOD PRESSURE: 72 MMHG | WEIGHT: 152.5 LBS | SYSTOLIC BLOOD PRESSURE: 158 MMHG | HEART RATE: 89 BPM | RESPIRATION RATE: 18 BRPM | HEIGHT: 64 IN | BODY MASS INDEX: 26.03 KG/M2 | TEMPERATURE: 97.5 F | OXYGEN SATURATION: 97 %

## 2024-05-08 DIAGNOSIS — H61.23 BILATERAL IMPACTED CERUMEN: Primary | ICD-10-CM

## 2024-05-08 DIAGNOSIS — I10 BENIGN ESSENTIAL HYPERTENSION: ICD-10-CM

## 2024-05-08 PROCEDURE — 99213 OFFICE O/P EST LOW 20 MIN: CPT | Performed by: PHYSICIAN ASSISTANT

## 2024-05-08 ASSESSMENT — PAIN SCALES - GENERAL: PAINLEVEL: NO PAIN (0)

## 2024-05-08 NOTE — PROGRESS NOTES
"Assessment and Plan:     (H61.23) Bilateral impacted cerumen  (primary encounter diagnosis)  Comment: has noticed muffled hearing x weeks, no pain or drainge  Plan: irrigated today, can try debrox in the future    (I10) Benign essential hypertension  Comment: a bit elevated today, on amlodipine 5mg daily and avalide, taking both, just had some caffeine, monitors at home and runs 140s/70s  Plan: continue to monitor at home, watch salt, watch caffeine intake      [facility]         Subjective   Tasha is a 88 year old, presenting for the following health issues:  Ear Problem    Ear Problem    History of Present Illness       Reason for visit:  Wax in my ears  Symptom onset:  1-2 weeks ago  Symptoms include:  Ears feel full and hearing is somewhat impaired.  Symptom intensity:  Mild  Symptom progression:  Worsening  Had these symptoms before:  Yes  Has tried/received treatment for these symptoms:  Yes  Previous treatment was successful:  Yes  Prior treatment description:  Ears flushed out  What makes it worse:  No  What makes it better:  No    She eats 4 or more servings of fruits and vegetables daily.She consumes 0 sweetened beverage(s) daily.She exercises with enough effort to increase her heart rate 30 to 60 minutes per day.  She exercises with enough effort to increase her heart rate 6 days per week.   She is taking medications regularly.     Tasha is here for ear wax build up   Her hearing has seemed a bit muffled recently, L>R  She denies pain or drainage    BP is up a little today  She is taking her medications as prescribed, amlodipine was recently increased to 5mg daily   She just had a couple cups of tea   She checks at home and runs 140s/70s       Objective      BP (!) 158/72   Pulse 89   Temp 97.5  F (36.4  C) (Temporal)   Resp 18   Ht 1.613 m (5' 3.5\")   Wt 69.2 kg (152 lb 8 oz)   SpO2 97%   BMI 26.59 kg/m      Physical Exam     EXAM:  GENERAL APPEARANCE: " healthy, alert and no distress  EYES: Eyes grossly normal to inspection  HENT: external canals with cerumen impaction bilaterally, oropharynx is clear without exudate or erythema, no tonsillar swelling  RESP: lungs clear to auscultation - no rales, rhonchi or wheezes  CV: regular rates and rhythm, normal S1 S2, no S3 or S4 and no murmur, click or rub  EXT: trace pitting edema bilat lower ext           Signed Electronically by: Afia Coello PA-C

## 2024-05-14 ENCOUNTER — MYC MEDICAL ADVICE (OUTPATIENT)
Dept: FAMILY MEDICINE | Facility: CLINIC | Age: 89
End: 2024-05-14
Payer: MEDICARE

## 2024-05-14 ENCOUNTER — NURSE TRIAGE (OUTPATIENT)
Dept: FAMILY MEDICINE | Facility: CLINIC | Age: 89
End: 2024-05-14
Payer: MEDICARE

## 2024-05-14 NOTE — TELEPHONE ENCOUNTER
"Nurse Triage SBAR    Is this a 2nd Level Triage? YES, LICENSED PRACTITIONER REVIEW IS REQUIRED    Situation: Called and spoke to the patient in relation to the [employer] message received earlier today.         Background: Patient states she has experienced 2 episodes of dizziness. The first episode occurred on May 7 when the patient was getting out of bed in the morning. Patient states she did not sleep well the night before. Patient had another slight episode this morning (not as bad as the first) and she made note that she did not sleep well again last night. The second episode occurred after the patient was laying in a dental chair for an hour and a half.     Assessment: Upon conversing with the patient, the patient denies any symptoms currently. During these episodes (last less than 5 minutes), the patient feels like she needs to hold onto a wall otherwise she will fall either to the R or L side. Episodes have occurred when patient goes from a laying to standing position (patient notes sitting to standing does not aggravate the symptoms). Once the dizziness subsides, the patient does still have some light headedness that will progressively get better throughout the day. Room is not spinning or tilting. No fever, no chest pain, no N/V, and no bleeding. Patient notes the only other symptoms she is experiencing is some \"pressure in the back of her head that moves around (sometimes it will be on the R side and sometimes it will be on the L side).\" Patient has mainly noticed the pressure over the past 3 days and describes it as a \"tingling sensation.\" Patient states she tried to take some Aspirin this morning with little relief. Patient did make note that she is prone to headaches in the past.     Patient is on 2 BP medications: Amlodipine and Irbesartan. Patient states her dose of Amlodipine was recently increased from 2.5 mg to 5 mg. Patient states Irbesartan in the AM and Amlodipine in the PM (around 8 PM). Patient " "states she is trying to keep herself hydrated.     Protocol Recommended Disposition:   Discuss With PCP And Callback By Nurse Today    Recommendation: Triage protocol recommending discuss with PCP and callback by nurse today. Will route HP to PCP for review and recommendations.     Routed to provider    Does the patient meet one of the following criteria for ADS visit consideration? 16+ years old, with an [facility] PCP     TIP  Providers, please consider if this condition is appropriate for management at one of our Acute and Diagnostic Services sites.     If patient is a good candidate, please use dotphrase <dot>triageresponse and select Refer to ADS to document.    1. DESCRIPTION: \"Describe your dizziness.\"      Episodes occur when patient is in laying position and moves to a standing position, patient states she does need to hang onto the wall during these episodes so she does not fall, feel a pressure in the back of head (been aware of for the past 3 days, has been constant, tried to take some Aspirin this morning with no relief, moves around the back of the head, pressure, \"almost a tingling\"), episodes last less than 5 minutes   2. LIGHTHEADED: \"Do you feel lightheaded?\" (e.g., somewhat faint, woozy, weak upon standing)      \"Patient states she feels tentative after these episodes. Feels a little light headed.\" Patient states she never feels like she is going to faint. No nausea. Sitting from standing does not aggravate her symptoms.   3. VERTIGO: \"Do you feel like either you or the room is spinning or tilting?\" (i.e. vertigo)      No  4. SEVERITY: \"How bad is it?\"  \"Do you feel like you are going to faint?\" \"Can you stand and walk?\"    - MILD: Feels slightly dizzy, but walking normally.    - MODERATE: Feels unsteady when walking, but not falling; interferes with normal activities (e.g., school, work).    - SEVERE: Unable to walk without falling, or requires assistance to walk without falling; feels like passing " "out now.       Patient states she needs to hang onto the wall during these episodes, once the episode subsides patient is able to walk around (went for a walk today and went out for lunch)  5. ONSET:  \"When did the dizziness begin?\"      First episode was May 7  6. AGGRAVATING FACTORS: \"Does anything make it worse?\" (e.g., standing, change in head position)      No  7. HEART RATE: \"Can you tell me your heart rate?\" \"How many beats in 15 seconds?\"  (Note: not all patients can do this)        \"I do not notice anything different with the heart rate\"  8. CAUSE: \"What do you think is causing the dizziness?\"      Unknown-Orthostatic Hypotension?  9. RECURRENT SYMPTOM: \"Have you had dizziness before?\" If Yes, ask: \"When was the last time?\" \"What happened that time?\"      New symptom  10. OTHER SYMPTOMS: \"Do you have any other symptoms?\" (e.g., fever, chest pain, vomiting, diarrhea, bleeding)        No fever, no chest pain, no vomiting, no nausea, no bleeding  11. PREGNANCY: \"Is there any chance you are pregnant?\" \"When was your last menstrual period?\"        No    Can we leave a detailed message on this number? YES  Phone number patient can be reached at: Home number on file [phone] (home)    Reason for Disposition   Taking a medicine that could cause dizziness (e.g., blood pressure medications, diuretics)    Additional Information   Negative: SEVERE difficulty breathing (e.g., struggling for each breath, speaks in single words)   Negative: Shock suspected (e.g., cold/pale/clammy skin, too weak to stand, low BP, rapid pulse)   Negative: Difficult to awaken or acting confused (e.g., disoriented, slurred speech)   Negative: Fainted, and still feels dizzy afterwards   Negative: Overdose (accidental or intentional) of medications   Negative: New neurologic deficit that is present now: * Weakness of the face, arm, or leg on one side of the body * Numbness of the face, arm, or leg on one side of the body * Loss of speech or " garbled speech   Negative: Heart beating < 50 beats per minute OR > 140 beats per minute   Negative: Sounds like a life-threatening emergency to the triager   Negative: Chest pain   Negative: Rectal bleeding, bloody stool, or tarry-black stool   Negative: Vomiting is main symptom   Negative: Diarrhea is main symptom   Negative: Headache is main symptom   Negative: Heat exhaustion suspected (i.e., dehydration from heat exposure)   Negative: Patient states that they are having an anxiety or panic attack   Negative: Dizziness from low blood sugar (i.e., < 60 mg/dl or 3.5 mmol/l)   Negative: SEVERE dizziness (e.g., unable to stand, requires support to walk, feels like passing out now)   Negative: SEVERE headache or neck pain   Negative: Spinning or tilting sensation (vertigo) present now and one or more stroke risk factors (i.e., hypertension, diabetes mellitus, prior stroke/TIA, heart attack, age over 60) (Exception: Prior physician evaluation for this AND no different/worse than usual.)   Negative: Neurologic deficit that was brief (now gone), ANY of the following:* Weakness of the face, arm, or leg on one side of the body* Numbness of the face, arm, or leg on one side of the body* Loss of speech or garbled speech   Negative: Loss of vision or double vision  (Exception: Similar to previous migraines.)   Negative: Extra heartbeats, irregular heart beating, or heart is beating very fast (i.e., 'palpitations')   Negative: Difficulty breathing   Negative: Drinking very little and dehydration suspected (e.g., no urine > 12 hours, very dry mouth, very lightheaded)   Negative: Follows bleeding (e.g., stomach, rectum, vagina)  (Exception: Became dizzy from sight of small amount blood.)   Negative: Patient sounds very sick or weak to the triager   Negative: Lightheadedness (dizziness) present now, after 2 hours of rest and fluids   Negative: Spinning or tilting sensation (vertigo) present now   Negative: Fever > 103 F (39.4  C)   Negative: Fever > 100.0 F (37.8 C) and has diabetes mellitus or a weak immune system (e.g., HIV positive, cancer chemotherapy, organ transplant, splenectomy, chronic steroids)   Negative: MODERATE dizziness (e.g., interferes with normal activities)  (Exception: Dizziness caused by heat exposure, sudden standing, or poor fluid intake.)   Negative: Vomiting occurs with dizziness   Negative: Patient wants to be seen    Protocols used: Dizziness-A-OH    Camilla Bourne RN

## 2024-05-14 NOTE — TELEPHONE ENCOUNTER
Please have her see team tomorrow or the next day just to be safe.    Thank you    Agustin Peguero M.D.

## 2024-05-15 ENCOUNTER — OFFICE VISIT (OUTPATIENT)
Dept: FAMILY MEDICINE | Facility: CLINIC | Age: 89
End: 2024-05-15
Payer: MEDICARE

## 2024-05-15 VITALS
WEIGHT: 151 LBS | OXYGEN SATURATION: 95 % | SYSTOLIC BLOOD PRESSURE: 145 MMHG | HEIGHT: 64 IN | HEART RATE: 89 BPM | RESPIRATION RATE: 16 BRPM | DIASTOLIC BLOOD PRESSURE: 76 MMHG | TEMPERATURE: 96.9 F | BODY MASS INDEX: 25.78 KG/M2

## 2024-05-15 DIAGNOSIS — R20.0 NUMBNESS AND TINGLING OF LEG: ICD-10-CM

## 2024-05-15 DIAGNOSIS — I10 BENIGN ESSENTIAL HYPERTENSION: ICD-10-CM

## 2024-05-15 DIAGNOSIS — C50.919 INVASIVE LOBULAR CARCINOMA OF BREAST IN FEMALE (H): ICD-10-CM

## 2024-05-15 DIAGNOSIS — R42 DIZZINESS: Primary | ICD-10-CM

## 2024-05-15 DIAGNOSIS — R20.2 NUMBNESS AND TINGLING OF LEG: ICD-10-CM

## 2024-05-15 DIAGNOSIS — R79.89 ELEVATED SERUM CREATININE: ICD-10-CM

## 2024-05-15 LAB
ERYTHROCYTE [DISTWIDTH] IN BLOOD BY AUTOMATED COUNT: 12.7 % (ref 10–15)
HCT VFR BLD AUTO: 41.7 % (ref 35–47)
HGB BLD-MCNC: 13.7 G/DL (ref 11.7–15.7)
MCH RBC QN AUTO: 30.6 PG (ref 26.5–33)
MCHC RBC AUTO-ENTMCNC: 32.9 G/DL (ref 31.5–36.5)
MCV RBC AUTO: 93 FL (ref 78–100)
PLATELET # BLD AUTO: 253 10E3/UL (ref 150–450)
RBC # BLD AUTO: 4.47 10E6/UL (ref 3.8–5.2)
WBC # BLD AUTO: 7.5 10E3/UL (ref 4–11)

## 2024-05-15 PROCEDURE — 36415 COLL VENOUS BLD VENIPUNCTURE: CPT | Performed by: PHYSICIAN ASSISTANT

## 2024-05-15 PROCEDURE — 85027 COMPLETE CBC AUTOMATED: CPT | Performed by: PHYSICIAN ASSISTANT

## 2024-05-15 PROCEDURE — 80048 BASIC METABOLIC PNL TOTAL CA: CPT | Performed by: PHYSICIAN ASSISTANT

## 2024-05-15 PROCEDURE — 99214 OFFICE O/P EST MOD 30 MIN: CPT | Performed by: PHYSICIAN ASSISTANT

## 2024-05-15 RX ORDER — LORAZEPAM 0.5 MG/1
0.5 TABLET ORAL ONCE
Qty: 1 TABLET | Refills: 0 | Status: SHIPPED | OUTPATIENT
Start: 2024-05-15 | End: 2024-05-15

## 2024-05-15 ASSESSMENT — PAIN SCALES - GENERAL: PAINLEVEL: NO PAIN (1)

## 2024-05-15 NOTE — NURSING NOTE
"l BP:  BP (!) 145/76   Pulse 89   Temp 96.9  F (36.1  C) (Temporal)   Resp 16   Ht 1.613 m (5' 3.5\")   Wt 68.5 kg (151 lb)   SpO2 95%   BMI 26.33 kg/m       89  Disposition: provider notified while patient in the clinic   "

## 2024-05-15 NOTE — PROGRESS NOTES
"HPI: Tasha is an 89 yo female here for dizziness.    Per triage note:    \"Background: Patient states she has experienced 2 episodes of dizziness. The first episode occurred on May 7 when the patient was getting out of bed in the morning. Patient states she did not sleep well the night before. Patient had another slight episode this morning (yesterday) (not as bad as the first) and she made note that she did not sleep well again last night. The second episode occurred after the patient was laying in a dental chair for an hour and a half.      Assessment: Upon conversing with the patient, the patient denies any symptoms currently. During these episodes (last less than 5 minutes), the patient feels like she needs to hold onto a wall otherwise she will fall either to the R or L side. Episodes have occurred when patient goes from a laying to standing position (patient notes sitting to standing does not aggravate the symptoms). Once the dizziness subsides, the patient does still have some light headedness that will progressively get better throughout the day. Room is not spinning or tilting. No fever, no chest pain, no N/V, and no bleeding. Patient notes the only other symptoms she is experiencing is some \"pressure in the back of her head that moves around (sometimes it will be on the R side and sometimes it will be on the L side).\" Patient has mainly noticed the pressure over the past 3 days and describes it as a \"tingling sensation.\" Patient states she tried to take some Aspirin this morning with little relief. Patient did make note that she is prone to headaches in the past.      Patient is on 2 BP medications: Amlodipine and Irbesartan. Patient states her dose of Amlodipine was recently increased from 2.5 mg to 5 mg. Patient states Irbesartan in the AM and Amlodipine in the PM (around 8 PM). Patient states she is trying to keep herself hydrated\".    Currently pt notes pressure in the back of her head  She has not felt " right since the dizziness started   She couldn't walk on the 2 more severe episodes, but continues to feel a sense of imbalance.  Denies any vision changes or speech changes  She feels like her legs are a little tingly bilat.  This comes and goes.  She was seen on 5/8/24 and didn't mention the dizziness as felt this may just resolve spont.  There was never any sense of vertigo    She is able to walk [place] one hour in the mall at [place]    Past Medical History:   Diagnosis Date    Abdominal pain 09/2013    ct abd and pelvis neg    Benign essential hypertension     nl mr renogram    Breast cancer (H) 2007    lumpectomy and xrt [place], got 5 years of arimidex; had 2nd breast ca with lumpectomy 9/2023 [place]    Erosive gastritis 10/2011    by egd, colon as above, also small ulcer    High cholesterol     Hx of colonoscopy 2007, 2011    nl, 2011 with cecal angioect and 2mm polyp    [facility] (iron deficiency anaemia) 2011, 2016 2011 colonoscopy cecal angioectasia, egd with hh, small ulcer    Invasive lobular carcinoma of breast in female (H) 08/2023    lumpectomy done [place], right breast, added arimidex, no xrt    Left flank pain 05/2021    ct abd and pelvis nl    Microscopic hematuria 2012    Dr. Alonzo, ct done 11/28/12 negative, cysto neg    Osteopenia 2008    [place], fu 2014 [place] and worse -1.5 left hip; fu here 7/18 a bit worse    Osteoporosis 2019    based on low impact fx of left shoulder, added prolia then    Syncope 2011    neg est echo     Past Surgical History:   Procedure Laterality Date    CATARACT IOL, RT/LT      LUMPECTOMY BREAST  2007    lumpectomy right breast  09/2023    [place], right breast    XR SHOULDER SURGERY [facility] LEFT Left 03/2019    [facility]     Social History     Tobacco Use    Smoking status: Never    Smokeless tobacco: Never   Substance Use Topics    Alcohol use: No     Current Outpatient Medications   Medication Sig Dispense Refill    acetaminophen (TYLENOL) 500 MG tablet Take 1,000 mg by mouth 4 times daily as  "needed for mild pain      amLODIPine (NORVASC) 5 MG tablet Take 1 tablet (5 mg) by mouth daily 90 tablet 3    anastrozole (ARIMIDEX) 1 MG tablet Take 1 tablet (1 mg) by mouth daily 90 tablet 3    calcium carbonate 600 mg-vitamin D 400 units (CALTRATE) 600-400 MG-UNIT per tablet Take 2 tablets by mouth every evening       cephALEXin (KEFLEX) 500 MG capsule TK 4 CS PO 30 TO 60 MINUTES PRIOR TO DENTAL WORK  2    denosumab (PROLIA) 60 MG/ML SOSY injection Inject 1 mL (60 mg) Subcutaneous every 6 months 1 mL 1    fish oil-omega-3 fatty acids 1000 MG capsule Take 2 g by mouth daily      irbesartan-hydrochlorothiazide (AVALIDE) 150-12.5 MG tablet Take 1 tablet by mouth daily 90 tablet 3    pantoprazole (PROTONIX) 40 MG EC tablet Take 1 tablet (40 mg) by mouth daily 90 tablet 3    polyethylene glycol (MIRALAX/GLYCOLAX) powder Take 1 capful by mouth daily as needed (While taking Norco)      Psyllium (METAMUCIL FREE & NATURAL PO) Take 3 Tablespoonful by mouth every evening      simvastatin (ZOCOR) 20 MG tablet TAKE 1 TABLET(20 MG) BY MOUTH EVERY EVENING 90 tablet 3     Allergies   Allergen Reactions    No Known Allergies      PHYSICAL EXAM:    BP (!) 145/76   Pulse 89   Temp 96.9  F (36.1  C) (Temporal)   Resp 16   Ht 1.613 m (5' 3.5\")   Wt 68.5 kg (151 lb)   SpO2 95%   BMI 26.33 kg/m      Patient appears non toxic  Neuro: normal speech and gait, CN II-XII grossly intact  Neck: no carotid bruits  Heart: RRR  Lungs: CTA bilat    Assessment and Plan:     (R42) Dizziness  (primary encounter diagnosis)  Comment: suspect inner ear etiology. Recd trial of meclizine but if sxs worsen or persist, MRI brain ordered given her hx of breast ca and HTN  Plan: Basic metabolic panel  (Ca, Cl, CO2, Creat,         Gluc, K, Na, BUN), CBC with platelets, MR Brain        w/o & w Contrast, LORazepam (ATIVAN) 0.5 MG         tablet            (R20.0,  R20.2) Numbness and tingling of leg  Comment:   Plan: MR Brain w/o & w Contrast, LORazepam " (ATIVAN)         0.5 MG tablet            (I10) Benign essential hypertension  Comment:   Plan: stable, no changes.    (C50.919) Invasive lobular carcinoma of breast in female (H)  Comment:   Plan: followed by Dr. Lul Mcdonald PA-C

## 2024-05-16 ENCOUNTER — MYC MEDICAL ADVICE (OUTPATIENT)
Dept: FAMILY MEDICINE | Facility: CLINIC | Age: 89
End: 2024-05-16
Payer: MEDICARE

## 2024-05-16 LAB
ANION GAP SERPL CALCULATED.3IONS-SCNC: 12 MMOL/L (ref 7–15)
BUN SERPL-MCNC: 14.9 MG/DL (ref 8–23)
CALCIUM SERPL-MCNC: 9.8 MG/DL (ref 8.8–10.2)
CHLORIDE SERPL-SCNC: 102 MMOL/L (ref 98–107)
CREAT SERPL-MCNC: 1.09 MG/DL (ref 0.51–0.95)
DEPRECATED HCO3 PLAS-SCNC: 25 MMOL/L (ref 22–29)
EGFRCR SERPLBLD CKD-EPI 2021: 49 ML/MIN/1.73M2
GLUCOSE SERPL-MCNC: 99 MG/DL (ref 70–99)
POTASSIUM SERPL-SCNC: 4.3 MMOL/L (ref 3.4–5.3)
SODIUM SERPL-SCNC: 139 MMOL/L (ref 135–145)

## 2024-05-17 NOTE — RESULT ENCOUNTER NOTE
"Tasha,    Your creatinine was mildly elevated likely due to dehydration.  Avoid medication like ibuprofen (Advil) or naproxen (Aleve) as they can also make this worse.  Try to drink more water and we can recheck this in a few weeks. I will put in the order and you can schedule a \"lab only\".    Jillian Mcdonald PA-C  "

## 2024-05-29 ENCOUNTER — LAB (OUTPATIENT)
Dept: LAB | Facility: CLINIC | Age: 89
End: 2024-05-29
Payer: MEDICARE

## 2024-05-29 DIAGNOSIS — R79.89 ELEVATED SERUM CREATININE: ICD-10-CM

## 2024-05-29 PROCEDURE — 80048 BASIC METABOLIC PNL TOTAL CA: CPT

## 2024-05-29 PROCEDURE — 36415 COLL VENOUS BLD VENIPUNCTURE: CPT

## 2024-05-30 LAB
ANION GAP SERPL CALCULATED.3IONS-SCNC: 8 MMOL/L (ref 7–15)
BUN SERPL-MCNC: 10.2 MG/DL (ref 8–23)
CALCIUM SERPL-MCNC: 9.5 MG/DL (ref 8.8–10.2)
CHLORIDE SERPL-SCNC: 99 MMOL/L (ref 98–107)
CREAT SERPL-MCNC: 0.77 MG/DL (ref 0.51–0.95)
DEPRECATED HCO3 PLAS-SCNC: 27 MMOL/L (ref 22–29)
EGFRCR SERPLBLD CKD-EPI 2021: 74 ML/MIN/1.73M2
GLUCOSE SERPL-MCNC: 98 MG/DL (ref 70–99)
POTASSIUM SERPL-SCNC: 4.6 MMOL/L (ref 3.4–5.3)
SODIUM SERPL-SCNC: 134 MMOL/L (ref 135–145)

## 2024-06-10 ENCOUNTER — ANCILLARY PROCEDURE (OUTPATIENT)
Dept: MRI IMAGING | Facility: CLINIC | Age: 89
End: 2024-06-10
Attending: PHYSICIAN ASSISTANT
Payer: MEDICARE

## 2024-06-10 DIAGNOSIS — R42 DIZZINESS: ICD-10-CM

## 2024-06-10 DIAGNOSIS — R20.0 NUMBNESS AND TINGLING OF LEG: ICD-10-CM

## 2024-06-10 DIAGNOSIS — R20.2 NUMBNESS AND TINGLING OF LEG: ICD-10-CM

## 2024-06-10 PROCEDURE — 255N000002 HC RX 255 OP 636: Mod: JZ | Performed by: PHYSICIAN ASSISTANT

## 2024-06-10 PROCEDURE — 70553 MRI BRAIN STEM W/O & W/DYE: CPT | Mod: MF

## 2024-06-10 PROCEDURE — A9585 GADOBUTROL INJECTION: HCPCS | Mod: JZ | Performed by: PHYSICIAN ASSISTANT

## 2024-06-10 RX ORDER — GADOBUTROL 604.72 MG/ML
7 INJECTION INTRAVENOUS ONCE
Status: COMPLETED | OUTPATIENT
Start: 2024-06-10 | End: 2024-06-10

## 2024-06-10 RX ADMIN — GADOBUTROL 7 ML: 604.72 INJECTION INTRAVENOUS at 13:26

## 2024-06-11 NOTE — RESULT ENCOUNTER NOTE
Tasha,    I am happy to report that the MRI of your brain did not show any tumors or acute strokes.  I suspect the dizzy spells were due to an issue with your inner ear.    Jillian Mcdonald PA-C

## 2024-08-01 ENCOUNTER — MYC MEDICAL ADVICE (OUTPATIENT)
Dept: FAMILY MEDICINE | Facility: CLINIC | Age: 89
End: 2024-08-01
Payer: MEDICARE

## 2024-08-01 DIAGNOSIS — Z00.00 MEDICARE ANNUAL WELLNESS VISIT, SUBSEQUENT: Primary | ICD-10-CM

## 2024-08-01 DIAGNOSIS — D50.9 IRON DEFICIENCY ANEMIA, UNSPECIFIED IRON DEFICIENCY ANEMIA TYPE: ICD-10-CM

## 2024-08-01 DIAGNOSIS — E78.5 HYPERLIPIDEMIA LDL GOAL <130: ICD-10-CM

## 2024-08-01 DIAGNOSIS — M81.0 AGE-RELATED OSTEOPOROSIS WITHOUT CURRENT PATHOLOGICAL FRACTURE: ICD-10-CM

## 2024-08-01 DIAGNOSIS — I10 BENIGN ESSENTIAL HYPERTENSION: ICD-10-CM

## 2024-08-01 RX ORDER — DIPHENHYDRAMINE HYDROCHLORIDE 50 MG/ML
50 INJECTION INTRAMUSCULAR; INTRAVENOUS
Start: 2024-08-08

## 2024-08-01 RX ORDER — MEPERIDINE HYDROCHLORIDE 25 MG/ML
25 INJECTION INTRAMUSCULAR; INTRAVENOUS; SUBCUTANEOUS EVERY 30 MIN PRN
OUTPATIENT
Start: 2024-08-08

## 2024-08-01 RX ORDER — METHYLPREDNISOLONE SODIUM SUCCINATE 125 MG/2ML
125 INJECTION, POWDER, LYOPHILIZED, FOR SOLUTION INTRAMUSCULAR; INTRAVENOUS
Start: 2024-08-08

## 2024-08-01 RX ORDER — ALBUTEROL SULFATE 90 UG/1
1-2 AEROSOL, METERED RESPIRATORY (INHALATION)
Start: 2024-08-08

## 2024-08-01 RX ORDER — ALBUTEROL SULFATE 0.83 MG/ML
2.5 SOLUTION RESPIRATORY (INHALATION)
OUTPATIENT
Start: 2024-08-08

## 2024-08-01 RX ORDER — EPINEPHRINE 1 MG/ML
0.3 INJECTION, SOLUTION, CONCENTRATE INTRAVENOUS EVERY 5 MIN PRN
OUTPATIENT
Start: 2024-08-08

## 2024-08-02 ENCOUNTER — TELEPHONE (OUTPATIENT)
Dept: FAMILY MEDICINE | Facility: CLINIC | Age: 89
End: 2024-08-02
Payer: MEDICARE

## 2024-08-02 NOTE — TELEPHONE ENCOUNTER
Order/Referral Request    Who is requesting: Patient     Orders being requested: Prolia Injection    Reason service is needed/diagnosis: .    When are orders needed by: After Sept. 9th    Has this been discussed with Provider: Yes    Does patient have a preference on a Group/Provider/Facility? [facility]    Does patient have an appointment scheduled?: No    Where to send orders: Place orders within Epic    Could we send this information to you in [facility] or would you prefer to receive a phone call?:   Patient would prefer a phone call   Okay to leave a detailed message?: Yes at Home number on file [phone] (home)

## 2024-08-02 NOTE — TELEPHONE ENCOUNTER
Patient has read the following MyChart:    Agustin Peguero MD   to Tasha Rose   PG      8/1/24  2:54 PM  Thank you for your note.  I have ordered the future labs as well as the Prolia injection.  Someone should reach out to you to schedule the injection but if they do not let me know.  With respect to the labs you can go on MyChart and schedule a lab only visit.     Agustin Momin RN

## 2024-08-09 ENCOUNTER — HOSPITAL ENCOUNTER (OUTPATIENT)
Dept: MAMMOGRAPHY | Facility: CLINIC | Age: 89
Discharge: HOME | End: 2024-08-09
Attending: INTERNAL MEDICINE | Admitting: INTERNAL MEDICINE
Payer: MEDICARE

## 2024-08-09 DIAGNOSIS — Z12.31 ENCOUNTER FOR SCREENING MAMMOGRAM FOR MALIGNANT NEOPLASM OF BREAST: ICD-10-CM

## 2024-08-09 DIAGNOSIS — C50.919 MALIGNANT NEOPLASM OF FEMALE BREAST, UNSPECIFIED ESTROGEN RECEPTOR STATUS, UNSPECIFIED LATERALITY, UNSPECIFIED SITE OF BREAST (H): ICD-10-CM

## 2024-08-09 PROCEDURE — 77063 BREAST TOMOSYNTHESIS BI: CPT

## 2024-08-14 ENCOUNTER — MYC MEDICAL ADVICE (OUTPATIENT)
Dept: FAMILY MEDICINE | Facility: CLINIC | Age: 89
End: 2024-08-14
Payer: MEDICARE

## 2024-08-14 NOTE — TELEPHONE ENCOUNTER
Called and spoke to the patient. Assisted in getting the patient scheduled for her next Prolia injection.     Appointments in Next Year      Sep 16, 2024 10:00 AM  Nurse Visit with CS JAMESON NURSE  [facility] ([facility] [place] ) [phone]     Will send message to the [facility] [employer] team to review order for Prolia.     Thank you,  Camilla Bourne, RN

## 2024-09-16 ENCOUNTER — TELEPHONE (OUTPATIENT)
Dept: FAMILY MEDICINE | Facility: CLINIC | Age: 89
End: 2024-09-16

## 2024-09-16 ENCOUNTER — NURSE TRIAGE (OUTPATIENT)
Dept: FAMILY MEDICINE | Facility: CLINIC | Age: 89
End: 2024-09-16

## 2024-09-16 ENCOUNTER — OFFICE VISIT (OUTPATIENT)
Dept: FAMILY MEDICINE | Facility: CLINIC | Age: 89
End: 2024-09-16
Payer: MEDICARE

## 2024-09-16 ENCOUNTER — ALLIED HEALTH/NURSE VISIT (OUTPATIENT)
Dept: NURSING | Facility: CLINIC | Age: 89
End: 2024-09-16
Payer: MEDICARE

## 2024-09-16 DIAGNOSIS — M81.0 AGE-RELATED OSTEOPOROSIS WITHOUT CURRENT PATHOLOGICAL FRACTURE: Primary | ICD-10-CM

## 2024-09-16 DIAGNOSIS — H93.8X9 SENSATION OF PLUGGED EAR, UNSPECIFIED LATERALITY: Primary | ICD-10-CM

## 2024-09-16 PROCEDURE — 96372 THER/PROPH/DIAG INJ SC/IM: CPT | Performed by: INTERNAL MEDICINE

## 2024-09-16 PROCEDURE — 99212 OFFICE O/P EST SF 10 MIN: CPT | Performed by: NURSE PRACTITIONER

## 2024-09-16 PROCEDURE — 99207 PR NO CHARGE NURSE ONLY: CPT

## 2024-09-16 NOTE — PROGRESS NOTES
Appointments in Next Year          Clinic Administered Medication Documentation      Prolia Documentation    Indication: Prolia  (denosumab) is a prescription medicine used to treat osteoporosis in patients who:   Are at high risk for fracture, meaning patients who have had a fracture related to osteoporosis, or who have multiple risk factors for fracture.  Cannot use another osteoporosis medicine or other osteoporosis medicines did not work well.  The timeline for early/late injections would be 4 weeks early and any time after the 6 month maria dolores. If a patient receives their injection late, then the subsequent injection would be 6 months from the date that they actually received the injection.    When was the last injection?  24  Was the last injection at least 6 months ago? Yes  Has the prior authorization been completed?  Yes  Is there an active order (written within the past 365 days, with administrations remaining, not ) in the chart?  Yes   GFR Estimate   Date Value Ref Range Status   2024 74 >60 mL/min/1.73m2 Final   2021 61 >60 mL/min/[1.73_m2] Final     Comment:     Non  GFR Calc  Starting 2018, serum creatinine based estimated GFR (eGFR) will be   calculated using the Chronic Kidney Disease Epidemiology Collaboration   (CKD-EPI) equation.       Has patient had a GFR within the last 12 months? Yes   Is GFR under 30, or patient has a diagnosis of CKD4 or CKD5? Yes   Calcium   Date Value Ref Range Status   2024 9.5 8.8 - 10.2 mg/dL Final   2021 9.6 8.5 - 10.1 mg/dL Final     Is calcium result 8.5 or above? Yes   Was the calcium done after last Prolia injection? Yes   Is there a calcium order for 1 month post Prolia injection? Yes. Schedule patient for Calcium lab in next month.   Patient denies gastric bypass or parathyroid surgery in past 6 months? Yes - patient denies.   Patient denies dental work in the past two months involving drilling into the  bone, such as implants/extractions, oral surgery or a tooth extraction that has not healed yet?  Yes  Patient denies plans for an emergency tooth extraction within the next week? Yes    The following steps were completed to comply with the REMS program for Prolia:  Reviewed information in the Medication Guide, including the serious risks of Prolia  and the symptoms of each risk.  Advised patient to seek prompt medical attention if they have signs or symptoms of any of the serious risks.  Provided each patient a copy of the Medication Guide and Patient Guide.    Prior to injection, verified patient identity using patient's name and date of birth. Medication was administered. Please see MAR and medication order for additional information. Patient instructed to remain in clinic for 15 minutes, report any adverse reaction to staff immediately, and remain in clinic for 15 minutes and report any adverse reaction to staff immediately but patient declined.    Vial/Syringe: Single dose vial. Was entire vial of medication used? Yes  Was this medication supplied by the patient? No  Patient has no refills remaining. Refill encounter opened, order pended and Routed to the provider

## 2024-09-16 NOTE — TELEPHONE ENCOUNTER
Spoke with Berto in Prior Auth Dept. She states patient will need new order for prolia entered for next injection as it is expiring at the end of this month.    Prolia pended for Review.

## 2024-09-16 NOTE — PROGRESS NOTES
Assessment & Plan     Sensation of plugged ear, unspecified laterality  Suspect eustachian tube dysfunction. Can try longer trial of flonase. Follow-up with ENT if not improving          Subjective   Tasha is a 88 year old, presenting for the following health issues:  No chief complaint on file.    HPI     Ear has been plugged  Tried flonase for 2 weeks   No nasal congestion        Review of Systems  Detailed as above         Objective    There were no vitals taken for this visit.  There is no height or weight on file to calculate BMI.  Physical Exam   NAD   Bilateral ear exam normal  Significant swelling bilateral nares             Signed Electronically by: MUNA Jones CNP

## 2024-09-16 NOTE — TELEPHONE ENCOUNTER
"Reason for Disposition   Patient wants to be seen    Additional Information   Negative: Sounds like a life-threatening emergency to the triager   Negative: Moving the earlobe or touching the ear clearly increases the pain   Negative: Foreign body stuck in the ear (e.g., bug, piece of cotton)   Negative: Pink or red swelling behind the ear   Negative: Stiff neck (can't touch chin to chest)   Negative: Patient sounds very sick or weak to the triager   Negative: Severe earache pain   Negative: Fever > 103 F (39.4 C)   Negative: Pointed object was inserted into the ear canal (e.g., a pencil, stick, or wire)   Negative: White, yellow, or green discharge   Negative: Diabetes mellitus or a weak immune system (e.g., HIV positive, cancer chemotherapy, transplant patient)   Negative: Bloody discharge or unexplained bleeding from ear canal   Negative: New blurred vision or vision changes   Negative: All other earaches  (Exceptions: Earache lasting < 1 hour, and earache from air travel.)    Answer Assessment - Initial Assessment Questions  1. LOCATION: \"Which ear is involved?\"      Right ear  2. ONSET: \"When did the ear start hurting\"       About 3 wks to a month ago  3. SEVERITY: \"How bad is the pain?\"  (Scale 1-10; mild, moderate or severe)    - MILD (1-3): doesn't interfere with normal activities     - MODERATE (4-7): interferes with normal activities or awakens from sleep     - SEVERE (8-10): excruciating pain, unable to do any normal activities       3/10  4. URI SYMPTOMS: \"Do you have a runny nose or cough?\"      Cough but I've had a cough for years  5. FEVER: \"Do you have a fever?\" If Yes, ask: \"What is your temperature, how was it measured, and when did it start?\"      no  6. CAUSE: \"Have you been swimming recently?\", \"How often do you use Q-TIPS?\", \"Have you had any recent air travel or scuba diving?\"      no  7. OTHER SYMPTOMS: \"Do you have any other symptoms?\" (e.g., headache, stiff neck, dizziness, vomiting, runny " "nose, decreased hearing)      no  8. PREGNANCY: \"Is there any chance you are pregnant?\" \"When was your last menstrual period?\"      no    Protocols used: Earache-A-OH    "

## 2024-09-19 NOTE — TELEPHONE ENCOUNTER
Patient Contact    Attempt # 1    Was call answered? No.    Left message for patient to call triage back.  Please schedule patient with TEAM provider if ear symptoms still persisting    Dejah Lopez RN

## 2024-09-19 NOTE — TELEPHONE ENCOUNTER
Patient Contact    Attempt # 1    Was call answered? No.    Left message for patient to call triage back.  Please schedule patient for Prolia Injection    Dejah Lopez RN.

## 2024-09-20 NOTE — TELEPHONE ENCOUNTER
Dr. Peguero, patient  was upset that her physical was cancelled. She states she made the appointment last year.     I did schedule her for 10/7/2024.     Please advise if this is okay, this was not a physical exam slot.       Meera Irby RN  [facility]

## 2024-09-20 NOTE — TELEPHONE ENCOUNTER
Patient Contact    Attempt # 2    Was call answered? No.    Left message for patient to call triage back.    Dejah Lopez RN

## 2024-09-20 NOTE — TELEPHONE ENCOUNTER
Patient Contact    Attempt # 2    Was call answered? No.    Left message for patient to call triage back.  Please schedule patient with TEAM provider if ear symptoms still persisting       Dejah Lopez RN

## 2024-09-23 ENCOUNTER — LAB (OUTPATIENT)
Dept: LAB | Facility: CLINIC | Age: 89
End: 2024-09-23
Payer: MEDICARE

## 2024-09-23 DIAGNOSIS — I10 BENIGN ESSENTIAL HYPERTENSION: ICD-10-CM

## 2024-09-23 DIAGNOSIS — M81.0 AGE-RELATED OSTEOPOROSIS WITHOUT CURRENT PATHOLOGICAL FRACTURE: ICD-10-CM

## 2024-09-23 DIAGNOSIS — E78.5 HYPERLIPIDEMIA LDL GOAL <130: ICD-10-CM

## 2024-09-23 DIAGNOSIS — Z00.00 MEDICARE ANNUAL WELLNESS VISIT, SUBSEQUENT: ICD-10-CM

## 2024-09-23 LAB
ALBUMIN SERPL BCG-MCNC: 4.2 G/DL (ref 3.5–5.2)
ALP SERPL-CCNC: 40 U/L (ref 40–150)
ALT SERPL W P-5'-P-CCNC: 15 U/L (ref 0–50)
ANION GAP SERPL CALCULATED.3IONS-SCNC: 10 MMOL/L (ref 7–15)
AST SERPL W P-5'-P-CCNC: 21 U/L (ref 0–45)
BILIRUB SERPL-MCNC: 0.5 MG/DL
BUN SERPL-MCNC: 11 MG/DL (ref 8–23)
CALCIUM SERPL-MCNC: 10 MG/DL (ref 8.8–10.4)
CHLORIDE SERPL-SCNC: 101 MMOL/L (ref 98–107)
CHOLEST SERPL-MCNC: 196 MG/DL
CREAT SERPL-MCNC: 0.74 MG/DL (ref 0.51–0.95)
EGFRCR SERPLBLD CKD-EPI 2021: 77 ML/MIN/1.73M2
ERYTHROCYTE [DISTWIDTH] IN BLOOD BY AUTOMATED COUNT: 12.7 % (ref 10–15)
FASTING STATUS PATIENT QL REPORTED: YES
FASTING STATUS PATIENT QL REPORTED: YES
GLUCOSE SERPL-MCNC: 98 MG/DL (ref 70–99)
HCO3 SERPL-SCNC: 28 MMOL/L (ref 22–29)
HCT VFR BLD AUTO: 40.4 % (ref 35–47)
HDLC SERPL-MCNC: 61 MG/DL
HGB BLD-MCNC: 13.1 G/DL (ref 11.7–15.7)
LDLC SERPL CALC-MCNC: 116 MG/DL
MCH RBC QN AUTO: 30.7 PG (ref 26.5–33)
MCHC RBC AUTO-ENTMCNC: 32.4 G/DL (ref 31.5–36.5)
MCV RBC AUTO: 95 FL (ref 78–100)
NONHDLC SERPL-MCNC: 135 MG/DL
PLATELET # BLD AUTO: 201 10E3/UL (ref 150–450)
POTASSIUM SERPL-SCNC: 4 MMOL/L (ref 3.4–5.3)
PROT SERPL-MCNC: 6.9 G/DL (ref 6.4–8.3)
RBC # BLD AUTO: 4.27 10E6/UL (ref 3.8–5.2)
SODIUM SERPL-SCNC: 139 MMOL/L (ref 135–145)
TRIGL SERPL-MCNC: 93 MG/DL
VIT D+METAB SERPL-MCNC: 54 NG/ML (ref 20–50)
WBC # BLD AUTO: 5.7 10E3/UL (ref 4–11)

## 2024-09-23 PROCEDURE — 36415 COLL VENOUS BLD VENIPUNCTURE: CPT

## 2024-09-23 PROCEDURE — 85027 COMPLETE CBC AUTOMATED: CPT

## 2024-09-23 PROCEDURE — 80053 COMPREHEN METABOLIC PANEL: CPT

## 2024-09-23 PROCEDURE — 80061 LIPID PANEL: CPT

## 2024-09-23 PROCEDURE — 82306 VITAMIN D 25 HYDROXY: CPT

## 2024-09-23 NOTE — TELEPHONE ENCOUNTER
Spoke with pt and confirmed her appt.     Next 5 appointments (look out 90 days)      Oct 07, 2024 12:30 PM  (Arrive by 12:10 PM)  Provider Visit with Agustin Peguero MD  [facility] ([facility] ) [address], [address]  [facility] [address]  [phone]         Aaron Monreal RN  [facility]

## 2024-10-07 ENCOUNTER — OFFICE VISIT (OUTPATIENT)
Dept: FAMILY MEDICINE | Facility: CLINIC | Age: 89
End: 2024-10-07
Payer: MEDICARE

## 2024-10-07 VITALS
HEART RATE: 88 BPM | BODY MASS INDEX: 24.79 KG/M2 | SYSTOLIC BLOOD PRESSURE: 148 MMHG | WEIGHT: 145.2 LBS | OXYGEN SATURATION: 94 % | DIASTOLIC BLOOD PRESSURE: 78 MMHG | RESPIRATION RATE: 18 BRPM | TEMPERATURE: 98 F | HEIGHT: 64 IN

## 2024-10-07 DIAGNOSIS — R63.5 WEIGHT GAIN: ICD-10-CM

## 2024-10-07 DIAGNOSIS — D50.9 IRON DEFICIENCY ANEMIA, UNSPECIFIED IRON DEFICIENCY ANEMIA TYPE: ICD-10-CM

## 2024-10-07 DIAGNOSIS — M81.0 AGE-RELATED OSTEOPOROSIS WITHOUT CURRENT PATHOLOGICAL FRACTURE: ICD-10-CM

## 2024-10-07 DIAGNOSIS — Z00.00 ENCOUNTER FOR MEDICARE ANNUAL WELLNESS EXAM: Primary | ICD-10-CM

## 2024-10-07 DIAGNOSIS — K29.60 EROSIVE GASTRITIS: ICD-10-CM

## 2024-10-07 DIAGNOSIS — C50.919 MALIGNANT NEOPLASM OF FEMALE BREAST, UNSPECIFIED ESTROGEN RECEPTOR STATUS, UNSPECIFIED LATERALITY, UNSPECIFIED SITE OF BREAST (H): ICD-10-CM

## 2024-10-07 DIAGNOSIS — C50.919 INVASIVE LOBULAR CARCINOMA OF BREAST IN FEMALE (H): ICD-10-CM

## 2024-10-07 DIAGNOSIS — I10 BENIGN ESSENTIAL HYPERTENSION: ICD-10-CM

## 2024-10-07 DIAGNOSIS — I10 ESSENTIAL HYPERTENSION WITH GOAL BLOOD PRESSURE LESS THAN 140/90: ICD-10-CM

## 2024-10-07 DIAGNOSIS — E78.5 HYPERLIPIDEMIA LDL GOAL <130: ICD-10-CM

## 2024-10-07 LAB
TSH SERPL DL<=0.005 MIU/L-ACNC: 1.27 UIU/ML (ref 0.3–4.2)
VIT D+METAB SERPL-MCNC: 56 NG/ML (ref 20–50)

## 2024-10-07 PROCEDURE — 36415 COLL VENOUS BLD VENIPUNCTURE: CPT | Performed by: INTERNAL MEDICINE

## 2024-10-07 PROCEDURE — 82306 VITAMIN D 25 HYDROXY: CPT | Performed by: INTERNAL MEDICINE

## 2024-10-07 PROCEDURE — G0439 PPPS, SUBSEQ VISIT: HCPCS | Performed by: INTERNAL MEDICINE

## 2024-10-07 PROCEDURE — 84443 ASSAY THYROID STIM HORMONE: CPT | Performed by: INTERNAL MEDICINE

## 2024-10-07 PROCEDURE — 99214 OFFICE O/P EST MOD 30 MIN: CPT | Mod: 25 | Performed by: INTERNAL MEDICINE

## 2024-10-07 RX ORDER — IRBESARTAN AND HYDROCHLOROTHIAZIDE 150; 12.5 MG/1; MG/1
1 TABLET, FILM COATED ORAL DAILY
Qty: 90 TABLET | Refills: 3 | Status: SHIPPED | OUTPATIENT
Start: 2024-10-07

## 2024-10-07 RX ORDER — PANTOPRAZOLE SODIUM 40 MG/1
40 TABLET, DELAYED RELEASE ORAL DAILY
Qty: 90 TABLET | Refills: 3 | Status: SHIPPED | OUTPATIENT
Start: 2024-10-07

## 2024-10-07 RX ORDER — SIMVASTATIN 20 MG
TABLET ORAL
Qty: 90 TABLET | Refills: 3 | Status: SHIPPED | OUTPATIENT
Start: 2024-10-07

## 2024-10-07 RX ORDER — AMLODIPINE BESYLATE 5 MG/1
5 TABLET ORAL DAILY
Qty: 90 TABLET | Refills: 3 | Status: SHIPPED | OUTPATIENT
Start: 2024-10-07

## 2024-10-07 SDOH — HEALTH STABILITY: PHYSICAL HEALTH: ON AVERAGE, HOW MANY DAYS PER WEEK DO YOU ENGAGE IN MODERATE TO STRENUOUS EXERCISE (LIKE A BRISK WALK)?: 6 DAYS

## 2024-10-07 ASSESSMENT — PAIN SCALES - GENERAL: PAINLEVEL: NO PAIN (0)

## 2024-10-07 ASSESSMENT — SOCIAL DETERMINANTS OF HEALTH (SDOH): HOW OFTEN DO YOU GET TOGETHER WITH FRIENDS OR RELATIVES?: TWICE A WEEK

## 2024-10-07 NOTE — PROGRESS NOTES
Preventive Care Visit  [facility] RON Peguero MD, Internal Medicine  Oct 7, 2024          Santa Cordova is a 88 year old, presenting for the following:    Overall she is doing very well and works out regularly.  She is up-to-date with oncology.  She is getting Prolia every 6 months indefinitely.    She has had some ear plugging.  This is improving.  She is using Flonase.    She otherwise is feeling well.  She is worried about weight gain although it has been fairly stable.  Her blood pressure is adequate.  No other issues.               Past Medical History:      Past Medical History:   Diagnosis Date    Abdominal pain 09/2013    ct abd and pelvis neg    Benign essential hypertension     nl mr renogram    Breast cancer (H) 2007    lumpectomy and xrt [facility], got 5 years of arimidex; had 2nd breast ca with lumpectomy 9/2023 [facility]    Erosive gastritis 10/2011    by egd, colon as above, also small ulcer    High cholesterol     Hx of colonoscopy 2007, 2011    nl, 2011 with cecal angioect and 2mm polyp    FRANKLIN (iron deficiency anaemia) 2011, 2016 2011 colonoscopy cecal angioectasia, egd with hh, small ulcer    Invasive lobular carcinoma of breast in female (H) 08/2023    lumpectomy done [facility], right breast, added arimidex, no xrt    Left flank pain 05/2021    ct abd and pelvis nl    Microscopic hematuria 2012    Dr. Alonzo, ct done 11/28/12 negative, cysto neg    Osteopenia 2008    [facility]ford, fu 2014 Paula and worse -1.5 left hip; fu here 7/18 a bit worse    Osteoporosis 2019    based on low impact fx of left shoulder, added prolia then    Syncope 2011    neg est echo             Past Surgical History:      Past Surgical History:   Procedure Laterality Date    CATARACT IOL, RT/LT      LUMPECTOMY BREAST  2007    lumpectomy right breast  09/2023    [facility], right breast    XR SHOULDER SURGERY WENDY LEFT Left 03/2019    tco             Social History:     Social History     Socioeconomic History    Marital  status:      Spouse name: Not on file    Number of children: 4    Years of education: Not on file    Highest education level: Not on file   Occupational History    Occupation: homemaker   Tobacco Use    Smoking status: Never    Smokeless tobacco: Never   Vaping Use    Vaping status: Never Used   Substance and Sexual Activity    Alcohol use: No    Drug use: No    Sexual activity: Yes     Partners: Male   Other Topics Concern    Parent/sibling w/ CABG, MI or angioplasty before 65F 55M? Not Asked   Social History Narrative    Not on file     Social Determinants of Health     Financial Resource Strain: Low Risk  (10/7/2024)    Financial Resource Strain     Within the past 12 months, have you or your family members you live with been unable to get utilities (heat, electricity) when it was really needed?: No   Food Insecurity: Low Risk  (10/7/2024)    Food Insecurity     Within the past 12 months, did you worry that your food would run out before you got money to buy more?: No     Within the past 12 months, did the food you bought just not last and you didn t have money to get more?: No   Transportation Needs: Low Risk  (10/7/2024)    Transportation Needs     Within the past 12 months, has lack of transportation kept you from medical appointments, getting your medicines, non-medical meetings or appointments, work, or from getting things that you need?: No   Physical Activity: Unknown (10/7/2024)    Exercise Vital Sign     Days of Exercise per Week: 6 days     Minutes of Exercise per Session: Not on file   Stress: No Stress Concern Present (10/7/2024)    [employer] [place] [employer] - Occupational Stress Questionnaire     Feeling of Stress : Only a little   Social Connections: Unknown (10/7/2024)    Social Connection and Isolation Panel [NHANES]     Frequency of Communication with Friends and Family: Not on file     Frequency of Social Gatherings with Friends and Family: Twice a week     Attends  Mandaen Services: Not on file     Active Member of Clubs or Organizations: Not on file     Attends Club or Organization Meetings: Not on file     Marital Status: Not on file   Interpersonal Safety: Low Risk  (10/7/2024)    Interpersonal Safety     Do you feel physically and emotionally safe where you currently live?: Yes     Within the past 12 months, have you been hit, slapped, kicked or otherwise physically hurt by someone?: No     Within the past 12 months, have you been humiliated or emotionally abused in other ways by your partner or ex-partner?: No   Housing Stability: Low Risk  (10/7/2024)    Housing Stability     Do you have housing? : Yes     Are you worried about losing your housing?: No             Family History:   reviewed         Allergies:     Allergies   Allergen Reactions    No Known Allergies              Medications:     Current Outpatient Medications   Medication Sig Dispense Refill    acetaminophen (TYLENOL) 500 MG tablet Take 1,000 mg by mouth 4 times daily as needed for mild pain      amLODIPine (NORVASC) 5 MG tablet Take 1 tablet (5 mg) by mouth daily. 90 tablet 3    anastrozole (ARIMIDEX) 1 MG tablet Take 1 tablet (1 mg) by mouth daily 90 tablet 3    calcium carbonate 600 mg-vitamin D 400 units (CALTRATE) 600-400 MG-UNIT per tablet Take 2 tablets by mouth every evening       cephALEXin (KEFLEX) 500 MG capsule TK 4 CS PO 30 TO 60 MINUTES PRIOR TO DENTAL WORK  2    denosumab (PROLIA) 60 MG/ML SOSY injection Inject 1 mL (60 mg) Subcutaneous every 6 months 1 mL 1    fish oil-omega-3 fatty acids 1000 MG capsule Take 2 g by mouth daily      irbesartan-hydrochlorothiazide (AVALIDE) 150-12.5 MG tablet Take 1 tablet by mouth daily. 90 tablet 3    pantoprazole (PROTONIX) 40 MG EC tablet Take 1 tablet (40 mg) by mouth daily. 90 tablet 3    polyethylene glycol (MIRALAX/GLYCOLAX) powder Take 1 capful by mouth daily as needed (While taking Norco)      Psyllium (METAMUCIL FREE & NATURAL PO) Take 3  "Tablespoonful by mouth every evening      simvastatin (ZOCOR) 20 MG tablet TAKE 1 TABLET(20 MG) BY MOUTH EVERY EVENING 90 tablet 3               Review of Systems:     The 10 point Review of Systems is negative other than noted in the HPI           Physical Exam:   Blood pressure (!) 148/78, pulse 88, temperature 98  F (36.7  C), temperature source Temporal, resp. rate 18, height 1.613 m (5' 3.5\"), weight 65.9 kg (145 lb 3.2 oz), SpO2 94%, not currently breastfeeding.    Exam:  Constitutional: healthy appearing, alert and in no distress  Heent: Normocephalic. Head without obvious masses or lesions. PERRLDC, EOMI. Mouth exam within normal limits: tongue, mucous membranes, posterior pharynx all normal, no lesions or abnormalities seen.  Tm's and canals within normal limits bilaterally. Neck supple, no nuchal rigidity or masses. No supraclavicular, or cervical adenopathy. Thyroid symmetric, no masses.  Cardiovascular: Regular rate and rhythm, no murmer, rub or gallops.  JVP not elevated, no edema.  Carotids within normal limits bilaterally, no bruits.  Respiratory: Normal respiratory effort.  Lungs clear, normal flow, no wheezing or crackles.  Gastrointestinal: Normal active bowel sounds.   Soft, not tender, no masses, guarding or rebound.  No hepatosplenomegaly.   Musculoskeletal: extremities normal, no gross deformities noted.  Skin: no suspicious lesions or rashes   Neurologic: Mental status within normal limits.  Speech fluent.  No gross motor abnormalities and gait intact.  Psychiatric: mentation appears normal and affect normal.         Data:   Labs reviewed with patient, tsh sent        Assessment:   Normal complete physical exam  Weight gain, doubt pathologic, check tsh  Elevated cholesterol on statin  Hypertension, control adequate  Ca breast, les ,follow up onc  Consuelo, no issues  Osteopor, prolia  hcm         Plan:   Prevnar 20 at pharm  Follow up ent if ongoing ear issues  Exercise  Check tsh      Agustin " MICHELLE Peguero            Physical        10/7/2024    11:36 AM   Additional Questions   Roomed by cheyenne         Health Care Directive  Patient has a Health Care Directive on file  Advance care planning document is on file and is current.    History of Present Illness       Reason for visit:  Annual wellness check   She is taking medications regularly.          Alejandra you have a good holiday it was nice we did Friday and that if we did it on Friday night so that was nice      10/7/2024   General Health   How would you rate your overall physical health? Good   Feel stress (tense, anxious, or unable to sleep) Only a little      (!) STRESS CONCERN      10/7/2024   Nutrition   Diet: Regular (no restrictions)            10/7/2024   Exercise   Days per week of moderate/strenous exercise 6 days            10/7/2024   Social Factors   Frequency of gathering with friends or relatives Twice a week   Worry food won't last until get money to buy more No   Food not last or not have enough money for food? No   Do you have housing? (Housing is defined as stable permanent housing and does not include staying ouside in a car, in a tent, in an abandoned building, in an overnight shelter, or couch-surfing.) Yes   Are you worried about losing your housing? No   Lack of transportation? No   Unable to get utilities (heat,electricity)? No            10/7/2024   Fall Risk   Fallen 2 or more times in the past year? No   Trouble with walking or balance? Yes   Reason Gait Speed Test Not Completed Patient declines   Reason for decline ear related             10/7/2024   Activities of Daily Living- Home Safety   Needs help with the following daily activites None of the above   Safety concerns in the home None of the above            10/7/2024   Dental   Dentist two times every year? Yes            10/7/2024   Hearing Screening   Hearing concerns? None of the above            10/7/2024   Driving Risk Screening   Patient/family members have  concerns about driving No            10/7/2024   General Alertness/Fatigue Screening   Have you been more tired than usual lately? No            10/7/2024   Urinary Incontinence Screening   Bothered by leaking urine in past 6 months Yes            10/7/2024   TB Screening   Were you born outside of the [place]? No            Today's PHQ-2 Score:       10/7/2024    11:43 AM   PHQ-2 ( 1999 [employer])   Q1: Little interest or pleasure in doing things 0   Q2: Feeling down, depressed or hopeless 0   PHQ-2 Score 0   Q1: Little interest or pleasure in doing things Not at all   Q2: Feeling down, depressed or hopeless Not at all   PHQ-2 Score 0           10/7/2024   Substance Use   Alcohol more than 3/day or more than 7/wk Not Applicable   Do you have a current opioid prescription? No   How severe/bad is pain from 1 to 10? 0/10 (No Pain)   Do you use any other substances recreationally? No        Social History     Tobacco Use    Smoking status: Never    Smokeless tobacco: Never   Vaping Use    Vaping status: Never Used   Substance Use Topics    Alcohol use: No    Drug use: No           8/9/2024   LAST FHS-7 RESULTS   1st degree relative breast or ovarian cancer Yes   Any relative bilateral breast cancer No   Any male have breast cancer No   Any ONE woman have BOTH breast AND ovarian cancer No   Any woman with breast cancer before 50yrs No   2 or more relatives with breast AND/OR ovarian cancer Yes   2 or more relatives with breast AND/OR bowel cancer No                     Reviewed and updated as needed this visit by Provider                    Eddie  Current providers sharing in care for this patient include:  Patient Care Team:  Agustin Peguero MD as PCP - General (Internal Medicine)  Agustin Peguero MD as Assigned PCP  Chelsea Mccarty MD as MD (Hematology & Oncology)  Chelsea Mccarty MD as Assigned Cancer Care Provider    The following health maintenance items are reviewed in Epic and correct as of today:  Health  "Maintenance   Topic Date Due    ANNUAL REVIEW OF HM ORDERS  Never done    MEDICARE ANNUAL WELLNESS VISIT  09/28/2024    COVID-19 Vaccine (9 - 2024-25 season) 11/25/2024    MAMMO SCREENING  08/09/2025    BMP  09/23/2025    LIPID  09/23/2025    FALL RISK ASSESSMENT  10/07/2025    ADVANCE CARE PLANNING  10/07/2029    DTAP/TDAP/TD IMMUNIZATION (7 - Td or Tdap) 08/12/2032    DEXA  11/15/2038    PHQ-2 (once per calendar year)  Completed    INFLUENZA VACCINE  Completed    Pneumococcal Vaccine: 65+ Years  Completed    ZOSTER IMMUNIZATION  Completed    RSV VACCINE  Completed    HPV IMMUNIZATION  Aged Out    MENINGITIS IMMUNIZATION  Aged Out    RSV MONOCLONAL ANTIBODY  Aged Out            Objective    Exam  BP (!) 152/74 (BP Location: Right arm, Patient Position: Sitting, Cuff Size: Adult Large)   Pulse 89   Temp 98  F (36.7  C) (Temporal)   Resp 18   Ht 1.613 m (5' 3.5\")   Wt 65.9 kg (145 lb 3.2 oz)   SpO2 94%   BMI 25.32 kg/m     Estimated body mass index is 25.32 kg/m  as calculated from the following:    Height as of this encounter: 1.613 m (5' 3.5\").    Weight as of this encounter: 65.9 kg (145 lb 3.2 oz).    Physical Exam          10/7/2024   Mini Cog   Mini-Cog Not Completed (choose reason) Patient declines          Although I cannot like           Signed Electronically by: Agustin Peguero MD    "

## 2024-10-07 NOTE — PATIENT INSTRUCTIONS
Get the prevnar 20 pneumonia shot at your pharmacy.    If the ear plugging is not gone soon I would see the ent doctor.  Dr. Jerrell Jama or partner in this building is very good.    I would suggest seeing one of the ear, nose and throat specialists [facility] [place].  I have pasted the email address below.  Their phone number is [phone].  They are in this building in [address].    [email or web address]  Patient Education   Preventive Care Advice   This is general advice given by our system to help you stay healthy. However, your care team may have specific advice just for you. Please talk to your care team about your preventive care needs.  Nutrition  Eat 5 or more servings of fruits and vegetables each day.  Try wheat bread, brown rice and whole grain pasta (instead of white bread, rice, and pasta).  Get enough calcium and vitamin D. Check the label on foods and aim for 100% of the RDA (recommended daily allowance).  Lifestyle  Exercise at least 150 minutes each week  (30 minutes a day, 5 days a week).  Do muscle strengthening activities 2 days a week. These help control your weight and prevent disease.  No smoking.  Wear sunscreen to prevent skin cancer.  Have a dental exam and cleaning every 6 months.  Yearly exams  See your health care team every year to talk about:  Any changes in your health.  Any medicines your care team has prescribed.  Preventive care, family planning, and ways to prevent chronic diseases.  Shots (vaccines)   HPV shots (up to age 26), if you've never had them before.  Hepatitis B shots (up to age 59), if you've never had them before.  COVID-19 shot: Get this shot when it's due.  Flu shot: Get a flu shot every year.  Tetanus shot: Get a tetanus shot every 10 years.  Pneumococcal, hepatitis A, and RSV shots: Ask your care team if you need these based on your risk.  Shingles shot (for age 50 and up)  General health tests  Diabetes screening:  Starting at age 35, Get  screened for diabetes at least every 3 years.  If you are younger than age 35, ask your care team if you should be screened for diabetes.  Cholesterol test: At age 39, start having a cholesterol test every 5 years, or more often if advised.  Bone density scan (DEXA): At age 50, ask your care team if you should have this scan for osteoporosis (brittle bones).  Hepatitis C: Get tested at least once in your life.  STIs (sexually transmitted infections)  Before age 24: Ask your care team if you should be screened for STIs.  After age 24: Get screened for STIs if you're at risk. You are at risk for STIs (including HIV) if:  You are sexually active with more than one person.  You don't use condoms every time.  You or a partner was diagnosed with a sexually transmitted infection.  If you are at risk for HIV, ask about PrEP medicine to prevent HIV.  Get tested for HIV at least once in your life, whether you are at risk for HIV or not.  Cancer screening tests  Cervical cancer screening: If you have a cervix, begin getting regular cervical cancer screening tests starting at age 21.  Breast cancer scan (mammogram): If you've ever had breasts, begin having regular mammograms starting at age 40. This is a scan to check for breast cancer.  Colon cancer screening: It is important to start screening for colon cancer at age 45.  Have a colonoscopy test every 10 years (or more often if you're at risk) Or, ask your provider about stool tests like a FIT test every year or Cologuard test every 3 years.  To learn more about your testing options, visit:   .  For help making a decision, visit:   https://bit.ly/tk23593.  Prostate cancer screening test: If you have a prostate, ask your care team if a prostate cancer screening test (PSA) at age 55 is right for you.  Lung cancer screening: If you are a current or former smoker ages 50 to 80, ask your care team if ongoing lung cancer screenings are right for you.  For informational purposes  only. Not to replace the advice of your health care provider. Copyright   2023 [facility]. All rights reserved. Clinically reviewed by the [facility] Transitions Program. [employer] 801911 - REV 01/24.  Preventing Falls: Care Instructions  Injuries and health problems such as trouble walking or poor eyesight can increase your risk of falling. So can some medicines. But there are things you can do to help prevent falls. You can exercise to get stronger. You can also arrange your home to make it safer.    Talk to your doctor about the medicines you take. Ask if any of them increase the risk of falls and whether they can be changed or stopped.   Try to exercise regularly. It can help improve your strength and balance. This can help lower your risk of falling.         Practice fall safety and prevention.   Wear low-heeled shoes that fit well and give your feet good support. Talk to your doctor if you have foot problems that make this hard.  Carry a cellphone or wear a medical alert device that you can use to call for help.  Use stepladders instead of chairs to reach high objects. Don't climb if you're at risk for falls. Ask for help, if needed.  Wear the correct eyeglasses, if you need them.        Make your home safer.   Remove rugs, cords, clutter, and furniture from walkways.  Keep your house well lit. Use night-lights in hallways and bathrooms.  Install and use sturdy handrails on stairways.  Wear nonskid footwear, even inside. Don't walk barefoot or in socks without shoes.        Be safe outside.   Use handrails, curb cuts, and ramps whenever possible.  Keep your hands free by using a shoulder bag or backpack.  Try to walk in well-lit areas. Watch out for uneven ground, changes in pavement, and debris.  Be careful in the winter. Walk on the grass or gravel when sidewalks are slippery. Use de-icer on steps and walkways. Add non-slip devices to shoes.    Put grab bars and nonskid mats in your  "shower or tub and near the toilet. Try to use a shower chair or bath bench when bathing.   Get into a tub or shower by putting in your weaker leg first. Get out with your strong side first. Have a phone or medical alert device in the bathroom with you.   Where can you learn more?  Go to https://www.Voiceit.net/patiented  Enter G117 in the search box to learn more about \"Preventing Falls: Care Instructions.\"  Current as of: July 17, 2023  Content Version: 14.2 2024 Yo.   Care instructions adapted under license by your healthcare professional. If you have questions about a medical condition or this instruction, always ask your healthcare professional. [employer] disclaims any warranty or liability for your use of this information.    Bladder Training: Care Instructions  Your Care Instructions     Bladder training is used to treat urge incontinence and stress incontinence. Urge incontinence means that the need to urinate comes on so fast that you can't get to a toilet in time. Stress incontinence means that you leak urine because of pressure on your bladder. For example, it may happen when you laugh, cough, or lift something heavy.  Bladder training can increase how long you can wait before you have to urinate. It can also help your bladder hold more urine. And it can give you better control over the urge to urinate.  It is important to remember that bladder training takes a few weeks to a few months to make a difference. You may not see results right away, but don't give up.  Follow-up care is a key part of your treatment and safety. Be sure to make and go to all appointments, and call your doctor if you are having problems. It's also a good idea to know your test results and keep a list of the medicines you take.  How can you care for yourself at home?  Work with your doctor to come up with a bladder training program that is right for you. You may use one or more of the " "following methods.  Delayed urination  In the beginning, try to keep from urinating for 5 minutes after you first feel the need to go.  While you wait, take deep, slow breaths to relax. Kegel exercises can also help you delay the need to go to the bathroom.  After some practice, when you can easily wait 5 minutes to urinate, try to wait 10 minutes before you urinate.  Slowly increase the waiting period until you are able to control when you have to urinate.  Scheduled urination  Empty your bladder when you first wake up in the morning.  Schedule times throughout the day when you will urinate.  Start by going to the bathroom every hour, even if you don't need to go.  Slowly increase the time between trips to the bathroom.  When you have found a schedule that works well for you, keep doing it.  If you wake up during the night and have to urinate, do it. Apply your schedule to waking hours only.  Kegel exercises  These tighten and strengthen pelvic muscles, which can help you control the flow of urine. (If doing these exercises causes pain, stop doing them and talk with your doctor.) To do Kegel exercises:  Squeeze your muscles as if you were trying not to pass gas. Or squeeze your muscles as if you were stopping the flow of urine. Your belly, legs, and buttocks shouldn't move.  Hold the squeeze for 3 seconds, then relax for 5 to 10 seconds.  Start with 3 seconds, then add 1 second each week until you are able to squeeze for 10 seconds.  Repeat the exercise 10 times a session. Do 3 to 8 sessions a day.  When should you call for help?  Watch closely for changes in your health, and be sure to contact your doctor if:    Your incontinence is getting worse.     You do not get better as expected.   Where can you learn more?  Go to https://www.healthwise.net/patiented  Enter V684 in the search box to learn more about \"Bladder Training: Care Instructions.\"  Current as of: November 15, 2023  Content Version: 14.2 2024 [employer].   Care instructions adapted under license by your healthcare professional. If you have questions about a medical condition or this instruction, always ask your healthcare professional. Healthwise[employer] disclaims any warranty or liability for your use of this information.

## 2024-10-08 NOTE — RESULT ENCOUNTER NOTE
Mrs. Rose,    It was very nice to see you.  Your labs are normal.  Your thyroid test and vitamin D level.  Please let me know if you have questions.    Agustin

## 2024-10-21 ENCOUNTER — ONCOLOGY VISIT (OUTPATIENT)
Dept: ONCOLOGY | Facility: CLINIC | Age: 89
End: 2024-10-21
Attending: INTERNAL MEDICINE
Payer: MEDICARE

## 2024-10-21 VITALS
TEMPERATURE: 98.1 F | BODY MASS INDEX: 27.44 KG/M2 | SYSTOLIC BLOOD PRESSURE: 147 MMHG | WEIGHT: 157.4 LBS | RESPIRATION RATE: 18 BRPM | HEART RATE: 87 BPM | OXYGEN SATURATION: 97 % | DIASTOLIC BLOOD PRESSURE: 76 MMHG

## 2024-10-21 DIAGNOSIS — C50.919 MALIGNANT NEOPLASM OF FEMALE BREAST, UNSPECIFIED ESTROGEN RECEPTOR STATUS, UNSPECIFIED LATERALITY, UNSPECIFIED SITE OF BREAST (H): ICD-10-CM

## 2024-10-21 PROCEDURE — G2211 COMPLEX E/M VISIT ADD ON: HCPCS | Performed by: INTERNAL MEDICINE

## 2024-10-21 PROCEDURE — 99214 OFFICE O/P EST MOD 30 MIN: CPT | Performed by: INTERNAL MEDICINE

## 2024-10-21 PROCEDURE — G0463 HOSPITAL OUTPT CLINIC VISIT: HCPCS | Performed by: INTERNAL MEDICINE

## 2024-10-21 RX ORDER — ANASTROZOLE 1 MG/1
1 TABLET ORAL DAILY
Qty: 90 TABLET | Refills: 3 | Status: SHIPPED | OUTPATIENT
Start: 2024-10-21

## 2024-10-21 ASSESSMENT — PAIN SCALES - GENERAL: PAINLEVEL: NO PAIN (0)

## 2024-10-21 NOTE — PROGRESS NOTES
ONCOLOGY HISTORY: Mrs. Rose is a female with breast cancer.     1.  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  She had lumpectomy.  She had stage I disease.  She received adjuvant radiation.  She took anastrozole for 5 years.     2.  Screening mammogram on 07/28/2023 revealed abnormality in right breast.  -Diagnostic mammogram and ultrasound on 08/03/2023 revealed 6 mm hypoechoic mass at 6 o'clock position in right breast.  No axillary lymphadenopathy.  -Right breast biopsy on 08/04/2023 revealed grade 1 invasive lobular carcinoma and lobular carcinoma in situ.  ER positive (%), SD positive and HER2/frida negative (IHC of 1+)  -On 09/20/2023, she had wide local excision of right breast with seed localization.  Pathology reveals invasive lobular carcinoma, grade 2.  0.8 cm.  Margins are negative.  No lymphovascular invasion. pT1b pNx.     3.  Anastrozole started in October 2023.  -No adjuvant radiation recommended.     4.  Bone density on 11/15/2023 reveals osteopenia. Mild improvement since 06/2018.     Subjective:   Ms. Rose is a 88-year-old female with breast cancer.  In 2007, she had a stage I left breast cancer treated with lumpectomy, radiation and 5 years of anastrozole.     In 2023, patient diagnosed with clinical stage 1 right breast invasive lobular carcinoma.  ER positive and HER2/frida negative.  She had lumpectomy.  No radiation given because of her old age.  She is on anastrozole.  She is doing well.    Mammogram on 08/09/24 is benign.     Patient is doing well for her age.  She has mild generalized weakness.  She is able to do all her activities. She goes for walk every day. No headache.  No dizziness.  No chest pain.  No shortness of breath.  No abdominal pain.  No nausea or vomiting.  No urinary or bowel complaint.  No bone pain. Gets some joint stiffness. Occasional hot flashes.  All other review of system is negative.      PHYSICAL EXAMINATION:   GENERAL:  Alert and oriented x 3.   VITAL SIGNS:  Reviewed.  ECOG PS of 0.     EYES:  No icterus.   NECK:  Supple. No lymphadenopathy. No thyromegaly.   AXILLAE:  No lymphadenopathy.   LUNGS:  Good air entry bilaterally.  No crackles or wheezing.   HEART:  Regular.  No murmur.   GI: Abdomen is soft.  Nontender. No mass.   EXTREMITIES:  No pedal edema.  No calf swelling or tenderness.   SKIN:  No rash.   BREAST: Breast exam done in the presence of NICOLAS Gallo.  -Both breasts reveal postsurgical changes.  No suspicious mass or skin lesion.    Labs: CBC and CMP done on 09/23/24 reviewed.     Assessment:  1.  An 88-year-old female with clinical stage I (pT1b pNx) right breast invasive lobular carcinoma diagnosed in 2023.  ER positive and HER2/frida negative. She is on anastrazole. No evidence of recurrence.  2.  Left breast invasive ductal carcinoma in 2007 status post lumpectomy, radiation and anastrozole. No evidence of recurrence.  3.  Osteopenia.     Plan:  Continue anastrazole.  Continue calcium and vitamin D twice a day.  Continue prolia with PCP.  See me in 6 months.     Discussion:  1.  Patient is doing well from breast cancer.  No evidence of recurrence. She will continue on anastrozole.  Patient has some side effects from anastrozole.  She gets some hot flashes.  She also gets joint stiffness.  Patient says that the side effects are tolerable.  She does not want any medication for hot flashes.  For joint stiffness, she will take over-the-counter Tylenol as needed.     2.  She has osteopenia.  It can get worse with anastrozole.  Last bone density in November 2023 had revealed improvement in osteopenia.    Patient gets Prolia every 6 months.  That will be continued.  She is on calcium and vitamin D twice a day.  She will continue on it.    3.  She had a few questions which were all answered.  I will see her in 6 months time for follow-up.     Total visit time of 30 minutes.  Time spent  in today's visit, review of chart/investigations today and documentation.

## 2024-10-21 NOTE — PATIENT INSTRUCTIONS
Continue anastrazole.  Continue calcium and vitamin D twice a day.  Continue prolia with PCP.  See me in 6 months.

## 2024-10-21 NOTE — PROGRESS NOTES
ONCOLOGY HISTORY: Mrs. Rose is a female with breast cancer.     1.  In 2007, patient had left breast invasive ductal carcinoma.  ER positive, WI positive and HER2/frida negative.  She had lumpectomy.  She had stage I disease.  She received adjuvant radiation.  She took anastrozole for 5 years.     2.  Screening mammogram on 07/28/2023 revealed abnormality in right breast.  -Diagnostic mammogram and ultrasound on 08/03/2023 revealed 6 mm hypoechoic mass at 6 o'clock position in right breast.  No axillary lymphadenopathy.  -Right breast biopsy on 08/04/2023 revealed grade 1 invasive lobular carcinoma and lobular carcinoma in situ.  ER positive (%), WI positive and HER2/frida negative (IHC of 1+)  -On 09/20/2023, she had wide local excision of right breast with seed localization.  Pathology reveals invasive lobular carcinoma, grade 2.  0.8 cm.  Margins are negative.  No lymphovascular invasion. pT1b pNx.     3.  Anastrozole started in October 2023.  -No adjuvant radiation recommended.     4.  Bone density on 11/15/2023 reveals osteopenia. Mild improvement since 06/2018.     Subjective:   Ms. Rose is a 88-year-old female with breast cancer.  In 2007, she had a stage I left breast cancer treated with lumpectomy, radiation and 5 years of anastrozole.     In 2023, patient diagnosed with clinical stage 1 right breast invasive lobular carcinoma.  ER positive and HER2/frida negative.  She had lumpectomy.  No radiation given because of her old age.  She is on anastrozole.  She is doing well.     Patient is doing well for her age.  She has mild generalized weakness.  She is able to do all her activities.  She gets anxiety. No headache.  No dizziness.  No chest pain.  No shortness of breath.  No abdominal pain.  No nausea or vomiting.  No urinary or bowel complaint.  No bone pain.  Occasional hot flashes.  All other review of system is negative.     PHYSICAL EXAMINATION:   GENERAL:  Alert and oriented x 3.   VITAL SIGNS:   Reviewed.  ECOG PS of 1.     EYES:  No icterus.   NECK:  Supple. No lymphadenopathy. No thyromegaly.   AXILLAE:  No lymphadenopathy.   LUNGS:  Good air entry bilaterally.  No crackles or wheezing.   HEART:  Regular.  No murmur.   GI: Abdomen is soft.  Nontender. No mass.   EXTREMITIES:  No pedal edema.  No calf swelling or tenderness.   SKIN:  No rash.      Assessment:  1.  An 88-year-old female with clinical stage I (pT1b pNx ) right breast invasive lobular carcinoma diagnosed in 2023.  ER positive and HER2/frida negative. No evidence of recurrence.  2.  Left breast invasive ductal carcinoma in 2007 status post lumpectomy, radiation and anastrozole. No evidence of recurrence.  3.  Osteopenia.     Plan:  Continue anastrazole.  Continue calcium and vitamin D twice a day.  Continue prolia at PCP office.  Mammogram in early August, 2024.  See me in 6 months.     Discussion:  1.  Patient is doing well from breast cancer.  No evidence of recurrence.     She is on anastrozole.  Overall she is tolerating it well.  She will continue on it.     She will get her next mammogram in early August.     2.  Patient has osteopenia. It can get worse with anastrozole.  She is on Prolia every 6 months which will be continued.  She will also continue on calcium and vitamin D twice a day.  She is tolerating Prolia well.  No dental or jaw related symptoms.  Last bone density in November revealed mild improvement in osteopenia.     3.  She had few questions which were all answered.  I will see her in 6 months time.  Advised her to call us with any questions or concerns.     4.  Blood pressure is elevated.  Patient says that she gets anxious.  She will have her blood pressure monitored through her PCP.     Total visit time of 30 minutes.  Time spent in today's visit, review of chart/investigations today and documentation today.

## 2024-10-21 NOTE — NURSING NOTE
"Oncology Rooming Note    October 21, 2024 8:06 AM   Aysha Rose is a 88 year old female who presents for:    Chief Complaint   Patient presents with    Oncology Clinic Visit     Initial Vitals: BP (!) 147/76   Pulse 87   Temp 98.1  F (36.7  C) (Oral)   Resp 18   Wt 71.4 kg (157 lb 6.4 oz)   SpO2 97%   BMI 27.44 kg/m   Estimated body mass index is 27.44 kg/m  as calculated from the following:    Height as of 10/7/24: 1.613 m (5' 3.5\").    Weight as of this encounter: 71.4 kg (157 lb 6.4 oz). Body surface area is 1.79 meters squared.  No Pain (0) Comment: Data Unavailable   No LMP recorded. Patient is postmenopausal.  Allergies reviewed: Yes  Medications reviewed: Yes    Medications: Medication refills not needed today.  Pharmacy name entered into [employer]: [id number] - [place], [place] - [address] [place] [facility] [place] [facility] [address]    Frailty Screening:   Is the patient here for a new oncology consult visit in cancer care? 2. No      Clinical concerns: follow up        Sophia Slater            "

## 2024-10-21 NOTE — LETTER
10/21/2024      Aysha Rose  [address] [facility] [address]      Dear Colleague,    Thank you for referring your patient, Aysha Rose, to the [facility]. Please see a copy of my visit note below.    ONCOLOGY HISTORY: Mrs. Rose is a female with breast cancer.     1.  In 2007, patient had left breast invasive ductal carcinoma.  ER positive, ND positive and HER2/frida negative.  She had lumpectomy.  She had stage I disease.  She received adjuvant radiation.  She took anastrozole for 5 years.     2.  Screening mammogram on 07/28/2023 revealed abnormality in right breast.  -Diagnostic mammogram and ultrasound on 08/03/2023 revealed 6 mm hypoechoic mass at 6 o'clock position in right breast.  No axillary lymphadenopathy.  -Right breast biopsy on 08/04/2023 revealed grade 1 invasive lobular carcinoma and lobular carcinoma in situ.  ER positive (%), ND positive and HER2/frida negative (IHC of 1+)  -On 09/20/2023, she had wide local excision of right breast with seed localization.  Pathology reveals invasive lobular carcinoma, grade 2.  0.8 cm.  Margins are negative.  No lymphovascular invasion. pT1b pNx.     3.  Anastrozole started in October 2023.  -No adjuvant radiation recommended.     4.  Bone density on 11/15/2023 reveals osteopenia. Mild improvement since 06/2018.     Subjective:   Ms. Rose is a 88-year-old female with breast cancer.  In 2007, she had a stage I left breast cancer treated with lumpectomy, radiation and 5 years of anastrozole.     In 2023, patient diagnosed with clinical stage 1 right breast invasive lobular carcinoma.  ER positive and HER2/frida negative.  She had lumpectomy.  No radiation given because of her old age.  She is on anastrozole.  She is doing well.    Mammogram on 08/09/24 is benign.     Patient is doing well for her age.  She has mild generalized weakness.  She is able to do all her activities. She goes for walk every day. No headache.  No  dizziness.  No chest pain.  No shortness of breath.  No abdominal pain.  No nausea or vomiting.  No urinary or bowel complaint.  No bone pain. Gets some joint stiffness. Occasional hot flashes.  All other review of system is negative.     PHYSICAL EXAMINATION:   GENERAL:  Alert and oriented x 3.   VITAL SIGNS:  Reviewed.  ECOG PS of 0.     EYES:  No icterus.   NECK:  Supple. No lymphadenopathy. No thyromegaly.   AXILLAE:  No lymphadenopathy.   LUNGS:  Good air entry bilaterally.  No crackles or wheezing.   HEART:  Regular.  No murmur.   GI: Abdomen is soft.  Nontender. No mass.   EXTREMITIES:  No pedal edema.  No calf swelling or tenderness.   SKIN:  No rash.   BREAST: Breast exam done in the presence of NICOLAS Gallo.  -Both breasts reveal postsurgical changes.  No suspicious mass or skin lesion.    Labs: CBC and CMP done on 09/23/24 reviewed.     Assessment:  1.  An 88-year-old female with clinical stage I (pT1b pNx) right breast invasive lobular carcinoma diagnosed in 2023.  ER positive and HER2/frida negative. She is on anastrazole. No evidence of recurrence.  2.  Left breast invasive ductal carcinoma in 2007 status post lumpectomy, radiation and anastrozole. No evidence of recurrence.  3.  Osteopenia.     Plan:  Continue anastrazole.  Continue calcium and vitamin D twice a day.  Continue prolia with PCP.  See me in 6 months.     Discussion:  1.  Patient is doing well from breast cancer.  No evidence of recurrence. She will continue on anastrozole.  Patient has some side effects from anastrozole.  She gets some hot flashes.  She also gets joint stiffness.  Patient says that the side effects are tolerable.  She does not want any medication for hot flashes.  For joint stiffness, she will take over-the-counter Tylenol as needed.     2.  She has osteopenia.  It can get worse with anastrozole.  Last bone density in November 2023 had revealed improvement in osteopenia.    Patient gets Prolia every 6 months.  That will  be continued.  She is on calcium and vitamin D twice a day.  She will continue on it.    3.  She had a few questions which were all answered.  I will see her in 6 months time for follow-up.     Total visit time of 30 minutes.  Time spent in today's visit, review of chart/investigations today and documentation.     ONCOLOGY HISTORY: Mrs. Rose is a female with breast cancer.     1.  In 2007, patient had left breast invasive ductal carcinoma.  ER positive, W[place] positive and HER2/frida negative.  She had lumpectomy.  She had stage I disease.  She received adjuvant radiation.  She took anastrozole for 5 years.     2.  Screening mammogram on 07/28/2023 revealed abnormality in right breast.  -Diagnostic mammogram and ultrasound on 08/03/2023 revealed 6 mm hypoechoic mass at 6 o'clock position in right breast.  No axillary lymphadenopathy.  -Right breast biopsy on 08/04/2023 revealed grade 1 invasive lobular carcinoma and lobular carcinoma in situ.  ER positive (%), WY positive and HER2/frida negative (IHC of 1+)  -On 09/20/2023, she had wide local excision of right breast with seed localization.  Pathology reveals invasive lobular carcinoma, grade 2.  0.8 cm.  Margins are negative.  No lymphovascular invasion. pT1b pNx.     3.  Anastrozole started in October 2023.  -No adjuvant radiation recommended.     4.  Bone density on 11/15/2023 reveals osteopenia. Mild improvement since 06/2018.     Subjective:   Ms. Rose is a 88-year-old female with breast cancer.  In 2007, she had a stage I left breast cancer treated with lumpectomy, radiation and 5 years of anastrozole.     In 2023, patient diagnosed with clinical stage 1 right breast invasive lobular carcinoma.  ER positive and HER2/frida negative.  She had lumpectomy.  No radiation given because of her old age.  She is on anastrozole.  She is doing well.     Patient is doing well for her age.  She has mild generalized weakness.  She is able to do all her activities.  She gets  anxiety. No headache.  No dizziness.  No chest pain.  No shortness of breath.  No abdominal pain.  No nausea or vomiting.  No urinary or bowel complaint.  No bone pain.  Occasional hot flashes.  All other review of system is negative.     PHYSICAL EXAMINATION:   GENERAL:  Alert and oriented x 3.   VITAL SIGNS:  Reviewed.  ECOG PS of 1.     EYES:  No icterus.   NECK:  Supple. No lymphadenopathy. No thyromegaly.   AXILLAE:  No lymphadenopathy.   LUNGS:  Good air entry bilaterally.  No crackles or wheezing.   HEART:  Regular.  No murmur.   GI: Abdomen is soft.  Nontender. No mass.   EXTREMITIES:  No pedal edema.  No calf swelling or tenderness.   SKIN:  No rash.      Assessment:  1.  An 88-year-old female with clinical stage I (pT1b pNx ) right breast invasive lobular carcinoma diagnosed in 2023.  ER positive and HER2/frida negative. No evidence of recurrence.  2.  Left breast invasive ductal carcinoma in 2007 status post lumpectomy, radiation and anastrozole. No evidence of recurrence.  3.  Osteopenia.     Plan:  Continue anastrazole.  Continue calcium and vitamin D twice a day.  Continue prolia at PCP office.  Mammogram in early August, 2024.  See me in 6 months.     Discussion:  1.  Patient is doing well from breast cancer.  No evidence of recurrence.     She is on anastrozole.  Overall she is tolerating it well.  She will continue on it.     She will get her next mammogram in early August.     2.  Patient has osteopenia. It can get worse with anastrozole.  She is on Prolia every 6 months which will be continued.  She will also continue on calcium and vitamin D twice a day.  She is tolerating Prolia well.  No dental or jaw related symptoms.  Last bone density in November revealed mild improvement in osteopenia.     3.  She had few questions which were all answered.  I will see her in 6 months time.  Advised her to call us with any questions or concerns.     4.  Blood pressure is elevated.  Patient says that she gets  anxious.  She will have her blood pressure monitored through her PCP.     Total visit time of 30 minutes.  Time spent in today's visit, review of chart/investigations today and documentation today.      Again, thank you for allowing me to participate in the care of your patient.        Sincerely,        Chelsea Mccarty MD

## 2024-10-28 ENCOUNTER — TRANSFERRED RECORDS (OUTPATIENT)
Dept: HEALTH INFORMATION MANAGEMENT | Facility: CLINIC | Age: 89
End: 2024-10-28
Payer: MEDICARE

## 2024-10-30 NOTE — PROGRESS NOTES
HPI      SUBJECTIVE:   Aysha Rose is a 81 year old female who presents to clinic today for the following health issues:      Comes in today with a complaint of wax in her ears and itching of the ears  Hearing is decreased on the left side, normal on the right  No URI symptoms    Past Medical History:   Diagnosis Date     Abdominal pain 9/13    ct abd and pelvis neg     Breast cancer (H) 2007    lumpectomy and xrt [place], got 5 years of arimidex     Erosive gastritis Oct 2011    by egd, colon as above, also small ulcer     High cholesterol      HTN (hypertension)     nl mr renogram     Hx of colonoscopy 2007, 2011    nl, 2011 with cecal angioect and 2mm polyp     [place] (iron deficiency anaemia) 2011, 2016    colonoscopy cecal angioectasia, egd with hh, small ulcer     Microscopic hematuria 2012    Dr. Alonzo, ct done 11/28/12 negative, cysto neg     Osteopenia 2008    [place],  [facility] [place] [facility] -1.5 left hip     Syncope 2011    neg est echo     Past Surgical History:   Procedure Laterality Date     CATARACT IOL, RT/LT       LUMPECTOMY BREAST  2007     Social History   Substance Use Topics     Smoking status: Never Smoker     Smokeless tobacco: Never Used     Alcohol use No     Current Outpatient Prescriptions   Medication Sig Dispense Refill     irbesartan-hydrochlorothiazide (AVALIDE) 150-12.5 MG per tablet Take 1 tablet by mouth daily 90 tablet 3     simvastatin (ZOCOR) 20 MG tablet TAKE 1 TABLET BY MOUTH EVERY DAY IN THE EVENING 90 tablet 3     psyllium (METAMUCIL) 30.9 % POWD Take 3 tsp by mouth daily.       calcium-vitamin D (CALCIUM 600 + D) 600-400 MG-UNIT per tablet Take 3 tablets by mouth daily.       Allergies   Allergen Reactions     Penicillins        Reviewed and updated as needed this visit by clinical staff and provider      Review of Systems   HENT:        Complains of decreased hearing in the left ear   All other systems reviewed and are negative.      /67 (BP Location: Right arm, Patient  "Position: Chair, Cuff Size: Adult Small)  Pulse 76  Temp 98.8  F (37.1  C) (Tympanic)  Resp 16  Ht 5' 4.5\" (1.638 m)  Wt 133 lb (60.3 kg)  SpO2 98%  BMI 22.48 kg/m2      Physical Exam   Constitutional: She is well-developed, well-nourished, and in no distress.   HENT:   Head: Normocephalic.   Right Ear: Tympanic membrane, external ear and ear canal normal.   Left Ear: Tympanic membrane and external ear normal.   Impacted cerumen in the left ear   Eyes: Conjunctivae and lids are normal.   Pulmonary/Chest: Effort normal.   Neurological: She is alert.   Skin: Skin is warm and dry.   Psychiatric: Mood, affect and judgment normal.   Nursing note and vitals reviewed.      Assessment and Plan:       ICD-10-CM    1. Impacted cerumen of left ear H61.22 HC REMOVAL IMPACTED CERUMEN IRRIGATION/LVG UNILAT       Impacted cerumen in left ear, will lavage by CMA  Call or return to clinic if symptoms are worse or do not improve      Kristel Kumar, RN NP Student    Genesis Amaral, APRN, CNP  [facility]        " Oriented - self; Oriented - place; Oriented - time

## 2025-01-24 ENCOUNTER — TRANSFERRED RECORDS (OUTPATIENT)
Dept: HEALTH INFORMATION MANAGEMENT | Facility: CLINIC | Age: OVER 89
End: 2025-01-24
Payer: MEDICARE

## 2025-02-04 ENCOUNTER — TRANSFERRED RECORDS (OUTPATIENT)
Dept: HEALTH INFORMATION MANAGEMENT | Facility: CLINIC | Age: OVER 89
End: 2025-02-04
Payer: MEDICARE

## 2025-02-06 DIAGNOSIS — E78.5 HYPERLIPIDEMIA LDL GOAL <130: ICD-10-CM

## 2025-02-06 RX ORDER — SIMVASTATIN 20 MG
TABLET ORAL
Qty: 90 TABLET | Refills: 1 | Status: SHIPPED | OUTPATIENT
Start: 2025-02-06

## 2025-03-17 ENCOUNTER — ALLIED HEALTH/NURSE VISIT (OUTPATIENT)
Dept: NURSING | Facility: CLINIC | Age: OVER 89
End: 2025-03-17
Payer: MEDICARE

## 2025-03-17 DIAGNOSIS — M81.0 AGE-RELATED OSTEOPOROSIS WITHOUT CURRENT PATHOLOGICAL FRACTURE: Primary | ICD-10-CM

## 2025-03-17 PROCEDURE — 99207 PR NO CHARGE NURSE ONLY: CPT

## 2025-03-17 PROCEDURE — 96372 THER/PROPH/DIAG INJ SC/IM: CPT | Performed by: INTERNAL MEDICINE

## 2025-03-17 NOTE — PROGRESS NOTES
Clinic Administered Medication Documentation      Prolia Documentation    Indication: Prolia  (denosumab) is a prescription medicine used to treat osteoporosis in patients who:   Are at high risk for fracture, meaning patients who have had a fracture related to osteoporosis, or who have multiple risk factors for fracture.  Cannot use another osteoporosis medicine or other osteoporosis medicines did not work well.  The timeline for early/late injections would be 4 weeks early and any time after the 6 month maria dolores. If a patient receives their injection late, then the subsequent injection would be 6 months from the date that they actually received the injection.    When was the last injection?  2024  Was the last injection at least 6 months ago? Yes  Has the prior authorization been completed?  Yes  Is there an active order (written within the past 365 days, with administrations remaining, not ) in the chart?  Yes   GFR Estimate   Date Value Ref Range Status   2024 77 >60 mL/min/1.73m2 Final     Comment:     eGFR calculated using  CKD-EPI equation.   2021 61 >60 mL/min/[1.73_m2] Final     Comment:     Non  GFR Calc  Starting 2018, serum creatinine based estimated GFR (eGFR) will be   calculated using the Chronic Kidney Disease Epidemiology Collaboration   (CKD-EPI) equation.       Has patient had a GFR within the last 12 months? Yes   Is GFR under 30, or patient has a diagnosis of CKD4 or CKD5? No   Patient denies gastric bypass or parathyroid surgery in past 6 months? Yes - patient denies.   Patient denies undergoing any dental procedures involving drilling into the bone, such as implants, extractions, or oral surgery, within the past two months that have not yet healed?  Yes - patient denies  Patient denies plans for an emergency tooth extraction within the next week? Yes    The following steps were completed to comply with the REMS program for Prolia:  Reviewed  information in the Medication Guide, including the serious risks of Prolia  and the symptoms of each risk.  Advised patient to seek prompt medical attention if they have signs or symptoms of any of the serious risks.  Provided each patient a copy of the Medication Guide and Patient Guide.    Prior to injection, verified patient identity using patient's name and date of birth. Medication was administered. Please see MAR and medication order for additional information. Patient instructed to remain in clinic for 15 minutes and report any adverse reaction to staff immediately.    Vial/Syringe: Syringe  Was this medication supplied by the patient? No  Verified that the patient has administrations remaining in their prescription.

## 2025-03-23 ENCOUNTER — HEALTH MAINTENANCE LETTER (OUTPATIENT)
Age: OVER 89
End: 2025-03-23

## 2025-03-25 ENCOUNTER — TRANSFERRED RECORDS (OUTPATIENT)
Dept: HEALTH INFORMATION MANAGEMENT | Facility: CLINIC | Age: OVER 89
End: 2025-03-25
Payer: MEDICARE

## 2025-04-08 ENCOUNTER — ONCOLOGY VISIT (OUTPATIENT)
Dept: ONCOLOGY | Facility: CLINIC | Age: OVER 89
End: 2025-04-08
Attending: INTERNAL MEDICINE
Payer: MEDICARE

## 2025-04-08 VITALS
OXYGEN SATURATION: 97 % | BODY MASS INDEX: 26.8 KG/M2 | HEART RATE: 88 BPM | SYSTOLIC BLOOD PRESSURE: 150 MMHG | HEIGHT: 64 IN | RESPIRATION RATE: 16 BRPM | DIASTOLIC BLOOD PRESSURE: 81 MMHG | WEIGHT: 157 LBS

## 2025-04-08 DIAGNOSIS — Z78.0 POST-MENOPAUSAL: ICD-10-CM

## 2025-04-08 DIAGNOSIS — Z79.811 AROMATASE INHIBITOR USE: ICD-10-CM

## 2025-04-08 DIAGNOSIS — Z12.31 ENCOUNTER FOR SCREENING MAMMOGRAM FOR MALIGNANT NEOPLASM OF BREAST: Primary | ICD-10-CM

## 2025-04-08 DIAGNOSIS — C50.919 MALIGNANT NEOPLASM OF FEMALE BREAST, UNSPECIFIED ESTROGEN RECEPTOR STATUS, UNSPECIFIED LATERALITY, UNSPECIFIED SITE OF BREAST (H): ICD-10-CM

## 2025-04-08 DIAGNOSIS — M85.80 OSTEOPENIA, UNSPECIFIED LOCATION: ICD-10-CM

## 2025-04-08 PROCEDURE — G0463 HOSPITAL OUTPT CLINIC VISIT: HCPCS | Performed by: INTERNAL MEDICINE

## 2025-04-08 PROCEDURE — 99214 OFFICE O/P EST MOD 30 MIN: CPT | Performed by: INTERNAL MEDICINE

## 2025-04-08 PROCEDURE — G2211 COMPLEX E/M VISIT ADD ON: HCPCS | Performed by: INTERNAL MEDICINE

## 2025-04-08 ASSESSMENT — PAIN SCALES - GENERAL: PAINLEVEL_OUTOF10: NO PAIN (0)

## 2025-04-08 NOTE — PROGRESS NOTES
ONCOLOGY HISTORY: Mrs. Rose is a female with breast cancer.     1.  In 2007, patient had left breast invasive ductal carcinoma.  ER positive, MI positive and HER2/frida negative.  She had lumpectomy.  She had stage I disease.  She received adjuvant radiation.  She took anastrozole for 5 years.     2.  Screening mammogram on 07/28/2023 revealed abnormality in right breast.  -Diagnostic mammogram and ultrasound on 08/03/2023 revealed 6 mm hypoechoic mass at 6 o'clock position in right breast.  No axillary lymphadenopathy.  -Right breast biopsy on 08/04/2023 revealed grade 1 invasive lobular carcinoma and lobular carcinoma in situ.  ER positive (%), MI positive and HER2/frida negative (IHC of 1+)  -On 09/20/2023, she had wide local excision of right breast with seed localization.  Pathology reveals invasive lobular carcinoma, grade 2.  0.8 cm.  Margins are negative.  No lymphovascular invasion. pT1b pNx.     3.  Anastrozole started in October 2023.  -No adjuvant radiation recommended.     4.  Bone density on 11/15/2023 reveals osteopenia. Mild improvement since 06/2018.     Subjective:   Ms. Rose is a 89-year-old female with breast cancer.  In 2007, she had a stage I left breast cancer treated with lumpectomy, radiation and 5 years of anastrozole.     In 2023, patient diagnosed with clinical stage 1 right breast invasive lobular carcinoma.  ER positive and HER2/frida negative.  She had lumpectomy.  No radiation given because of her old age.  She is on anastrozole.  She gets mild aches and pain and some joint stiffness from anastrozole.  They are tolerable.    She had a stress fracture of the left metatarsal in January.  She is seeing orthopedics     Patient is doing well for her age.  She has mild generalized weakness.  She is able to do all her activities.  She walks about 3 miles a day. No headache.  No dizziness.  No chest pain.  No shortness of breath.  No abdominal pain.  No nausea or vomiting.  Appetite is  good.  No urinary or bowel complaint.  No bleeding.  Occasional hot flashes.  All other review of system is negative.     PHYSICAL EXAMINATION:   GENERAL:  Alert and oriented x 3.   VITAL SIGNS:  Reviewed.  ECOG PS of 1.     EYES:  No icterus.   NECK:  Supple. No lymphadenopathy. No thyromegaly.   AXILLAE:  No lymphadenopathy.   LUNGS:  Good air entry bilaterally.  No crackles or wheezing.   HEART:  Regular.  No murmur.   GI: Abdomen is soft.  Nontender. No mass.   EXTREMITIES:  No pedal edema.  No calf swelling or tenderness.   SKIN:  No rash.   BREAST: Not examined as it was examined 6 months ago.     Assessment:  1.  An 89-year-old female with clinical stage I (pT1b pNx ) right breast invasive lobular carcinoma diagnosed in 2023 status post wide excision. ER positive and HER2/frida negative. No evidence of recurrence.  2.  Left breast invasive ductal carcinoma in 2007 status post lumpectomy, radiation and anastrozole. No evidence of recurrence.  3.  Osteopenia.     Plan:  Continue anastrazole.  Continue calcium and vitamin D twice a day.  Mammogram in September.  Bone density in 6 months.  Continue prolia with PCP.  See me in 6 months.     Discussion:  1.  Patient is doing well from breast cancer.  No evidence of recurrence.     She will continue on anastrozole.  Overall she is tolerating it well.       She will get her mammogram in late August or September.     2.  Patient has osteopenia. She is on Prolia every 6 months.  She is on calcium and vitamin D twice a day.  She is tolerating Prolia well.  No dental or jaw related symptoms.      Last bone density in November 2023 revealed mild improvement in osteopenia.  Will get another bone density in 6 months     3.  She had few questions which were all answered.  I will see her in 6 months time.  Advised her to call us with any questions or concerns.     Total visit time of 30 minutes.  Time spent in today's visit, review of chart/investigations today and  documentation today.

## 2025-04-08 NOTE — PROGRESS NOTES
"Oncology Rooming Note    April 8, 2025 7:49 AM   Aysha Rose is a 89 year old female who presents for:    Chief Complaint   Patient presents with    Oncology Clinic Visit     Initial Vitals: BP (!) 150/81   Pulse 88   Resp 16   Ht 1.613 m (5' 3.5\")   Wt 71.2 kg (157 lb)   SpO2 97%   BMI 27.38 kg/m   Estimated body mass index is 27.38 kg/m  as calculated from the following:    Height as of this encounter: 1.613 m (5' 3.5\").    Weight as of this encounter: 71.2 kg (157 lb). Body surface area is 1.79 meters squared.  No Pain (0) Comment: Data Unavailable   No LMP recorded. Patient is postmenopausal.  Allergies reviewed: Yes  Medications reviewed: Yes    Medications: Medication refills not needed today.  Pharmacy name entered into [employer]: [id number] - [place], [place] - [address] AT [facility] [address]    Frailty Screening:   Is the patient here for a new oncology consult visit in cancer care? 2. No    PHQ9:  Did this patient require a PHQ9?: Yes   If the patient required a PHQ9 assessment, did the results require a follow up with the Provider/Nurse?: No        Lillian Grajeda MA            "

## 2025-04-08 NOTE — LETTER
"4/8/2025      Aysha Rose  [address] [facility] [address]      Dear Colleague,    Thank you for referring your patient, Aysha Rose, to the [facility]. Please see a copy of my visit note below.    Oncology Rooming Note    April 8, 2025 7:49 AM   Aysha Rose is a 89 year old female who presents for:    Chief Complaint   Patient presents with     Oncology Clinic Visit     Initial Vitals: BP (!) 150/81   Pulse 88   Resp 16   Ht 1.613 m (5' 3.5\")   Wt 71.2 kg (157 lb)   SpO2 97%   BMI 27.38 kg/m   Estimated body mass index is 27.38 kg/m  as calculated from the following:    Height as of this encounter: 1.613 m (5' 3.5\").    Weight as of this encounter: 71.2 kg (157 lb). Body surface area is 1.79 meters squared.  No Pain (0) Comment: Data Unavailable   No LMP recorded. Patient is postmenopausal.  Allergies reviewed: Yes  Medications reviewed: Yes    Medications: Medication refills not needed today.  Pharmacy name entered into [employer]: [id number] [place], [place] - [address] [facility] [address]    Frailty Screening:   Is the patient here for a new oncology consult visit in cancer care? 2. No    PHQ9:  Did this patient require a PHQ9?: Yes   If the patient required a PHQ9 assessment, did the results require a follow up with the Provider/Nurse?: No        Lillian Grajeda MA              ONCOLOGY HISTORY: Mrs. Rose is a female with breast cancer.     1.  In 2007, patient had left breast invasive ductal carcinoma.  ER positive, WA positive and HER2/frida negative.  She had lumpectomy.  She had stage I disease.  She received adjuvant radiation.  She took anastrozole for 5 years.     2.  Screening mammogram on 07/28/2023 revealed abnormality in right breast.  -Diagnostic mammogram and ultrasound on 08/03/2023 revealed 6 mm hypoechoic mass at 6 o'clock position in right breast.  No axillary lymphadenopathy.  -Right breast biopsy on 08/04/2023 " revealed grade 1 invasive lobular carcinoma and lobular carcinoma in situ.  ER positive (%), VA positive and HER2/frida negative (IHC of 1+)  -On 09/20/2023, she had wide local excision of right breast with seed localization.  Pathology reveals invasive lobular carcinoma, grade 2.  0.8 cm.  Margins are negative.  No lymphovascular invasion. pT1b pNx.     3.  Anastrozole started in October 2023.  -No adjuvant radiation recommended.     4.  Bone density on 11/15/2023 reveals osteopenia. Mild improvement since 06/2018.     Subjective:   Ms. Rose is a 89-year-old female with breast cancer.  In 2007, she had a stage I left breast cancer treated with lumpectomy, radiation and 5 years of anastrozole.     In 2023, patient diagnosed with clinical stage 1 right breast invasive lobular carcinoma.  ER positive and HER2/frida negative.  She had lumpectomy.  No radiation given because of her old age.  She is on anastrozole.  She gets mild aches and pain and some joint stiffness from anastrozole.  They are tolerable.    She had a stress fracture of the left metatarsal in January.  She is seeing orthopedics     Patient is doing well for her age.  She has mild generalized weakness.  She is able to do all her activities.  She walks about 3 miles a day. No headache.  No dizziness.  No chest pain.  No shortness of breath.  No abdominal pain.  No nausea or vomiting.  Appetite is good.  No urinary or bowel complaint.  No bleeding.  Occasional hot flashes.  All other review of system is negative.     PHYSICAL EXAMINATION:   GENERAL:  Alert and oriented x 3.   VITAL SIGNS:  Reviewed.  ECOG PS of 1.     EYES:  No icterus.   NECK:  Supple. No lymphadenopathy. No thyromegaly.   AXILLAE:  No lymphadenopathy.   LUNGS:  Good air entry bilaterally.  No crackles or wheezing.   HEART:  Regular.  No murmur.   GI: Abdomen is soft.  Nontender. No mass.   EXTREMITIES:  No pedal edema.  No calf swelling or tenderness.   SKIN:  No rash.   BREAST: Not  examined as it was examined 6 months ago.     Assessment:  1.  An 89-year-old female with clinical stage I (pT1b pNx ) right breast invasive lobular carcinoma diagnosed in 2023 status post wide excision. ER positive and HER2/frida negative. No evidence of recurrence.  2.  Left breast invasive ductal carcinoma in 2007 status post lumpectomy, radiation and anastrozole. No evidence of recurrence.  3.  Osteopenia.     Plan:  Continue anastrazole.  Continue calcium and vitamin D twice a day.  Mammogram in September.  Bone density in 6 months.  Continue prolia with PCP.  See me in 6 months.     Discussion:  1.  Patient is doing well from breast cancer.  No evidence of recurrence.     She will continue on anastrozole.  Overall she is tolerating it well.       She will get her mammogram in late August or September.     2.  Patient has osteopenia. She is on Prolia every 6 months.  She is on calcium and vitamin D twice a day.  She is tolerating Prolia well.  No dental or jaw related symptoms.      Last bone density in November 2023 revealed mild improvement in osteopenia.  Will get another bone density in 6 months     3.  She had few questions which were all answered.  I will see her in 6 months time.  Advised her to call us with any questions or concerns.     Total visit time of 30 minutes.  Time spent in today's visit, review of chart/investigations today and documentation today.         Again, thank you for allowing me to participate in the care of your patient.        Sincerely,        Chelsea Mccarty MD    Electronically signed

## 2025-04-08 NOTE — PATIENT INSTRUCTIONS
Continue anastrazole.  Continue calcium and vitamin D twice a day.  Mammogram in September.  Bone density in 6 months.  Continue prolia with PCP.  See me in 6 months.

## 2025-06-24 DIAGNOSIS — E78.5 HYPERLIPIDEMIA LDL GOAL <130: ICD-10-CM

## 2025-06-24 RX ORDER — SIMVASTATIN 20 MG
TABLET ORAL
Qty: 90 TABLET | Refills: 1 | OUTPATIENT
Start: 2025-06-24

## 2025-08-26 DIAGNOSIS — I10 BENIGN ESSENTIAL HYPERTENSION: ICD-10-CM

## 2025-08-26 RX ORDER — AMLODIPINE BESYLATE 5 MG/1
5 TABLET ORAL DAILY
Qty: 90 TABLET | Refills: 3 | OUTPATIENT
Start: 2025-08-26

## 2025-09-02 ENCOUNTER — HOSPITAL ENCOUNTER (OUTPATIENT)
Dept: MAMMOGRAPHY | Facility: CLINIC | Age: OVER 89
Discharge: HOME | End: 2025-09-02
Attending: INTERNAL MEDICINE
Payer: MEDICARE

## 2025-09-02 DIAGNOSIS — Z12.31 ENCOUNTER FOR SCREENING MAMMOGRAM FOR MALIGNANT NEOPLASM OF BREAST: ICD-10-CM

## 2025-09-02 PROCEDURE — 77063 BREAST TOMOSYNTHESIS BI: CPT

## (undated) DEVICE — ESU GROUND PAD UNIVERSAL W/O CORD

## (undated) DEVICE — SPONGE PACK VAGINAL 2X36"

## (undated) DEVICE — SU MONOCRYL 3-0 PS-2 27" Y427H

## (undated) DEVICE — ESU ELEC NDL 1" E1552

## (undated) DEVICE — BONE CEMENT MIXEVAC HI VAC W/CARTRIDGE 0306-563-000

## (undated) DEVICE — SPONGE LAP 18X18" X8435

## (undated) DEVICE — DRAPE IOBAN INCISE 23X17" 6650EZ

## (undated) DEVICE — SU NDL MAYO 1/2 216703

## (undated) DEVICE — GLOVE PROTEXIS BLUE W/NEU-THERA 8.5  2D73EB85

## (undated) DEVICE — DRSG KERLIX FLUFFS X5

## (undated) DEVICE — SOL WATER IRRIG 1000ML BOTTLE 2F7114

## (undated) DEVICE — BLADE SAW SAGITTAL STRK 25X89.5X0.96MM 4/2000 2108-393-005

## (undated) DEVICE — DRSG KERLIX 4 1/2"X4YDS ROLL 6715

## (undated) DEVICE — SOL NACL 0.9% IRRIG 3000ML BAG 2B7477

## (undated) DEVICE — Device

## (undated) DEVICE — BLADE KNIFE SURG 10 371110

## (undated) DEVICE — SYR 50ML CATH TIP W/O NDL 309620

## (undated) DEVICE — DRSG STERI STRIP 1/2X4" R1547

## (undated) DEVICE — DRAPE STERI U 1015

## (undated) DEVICE — LINEN TOWEL PACK X5 5464

## (undated) DEVICE — PACK SET-UP STD 9102

## (undated) DEVICE — SOL NACL 0.9% IRRIG 1000ML BOTTLE 07138-09

## (undated) DEVICE — GLOVE PROTEXIS W/NEU-THERA 8.5  2D73TE85

## (undated) DEVICE — PREP DURAPREP 26ML APL 8630

## (undated) DEVICE — DRSG GAUZE 4X4" 3033

## (undated) DEVICE — STPL SKIN PROXIMATE 35 WIDE PMW35

## (undated) DEVICE — WRAP MCCONNELL ARM SUPPORT LG 12-401

## (undated) DEVICE — SU ETHIBOND 2 V-37 4X30" MX69G

## (undated) DEVICE — DRSG ADAPTIC 3X8" 6113

## (undated) DEVICE — SPONGE RAY-TEC 4X8" 7318

## (undated) DEVICE — DRAIN HEMOVAC RESERVOIR KIT 10FR 1/8" MED 00-2550-002-10

## (undated) DEVICE — SU FIBERWIRE 2 38"  AR-7200

## (undated) DEVICE — DRAPE STERI TOWEL LG 1010

## (undated) DEVICE — ESU CLEANER TIP 31142717

## (undated) DEVICE — NDL 19GA 1.5"

## (undated) DEVICE — BLADE KNIFE SURG 15 371115

## (undated) DEVICE — SUCTION IRR SYSTEM W/O TIP INTERPULSE HANDPIECE 0210-100-000

## (undated) DEVICE — BONE CLEANING TIP INTERPULSE  0210-010-000

## (undated) DEVICE — MANIFOLD NEPTUNE 4 PORT 700-20

## (undated) DEVICE — DRAPE CONVERTORS U-DRAPE 60X72" 8476

## (undated) DEVICE — SU VICRYL 0 CT-1 CR 8X18" J740D

## (undated) DEVICE — PACK OPEN SHOULDER SOP15OCFSC

## (undated) DEVICE — SYR 03ML LL W/O NDL 309657

## (undated) DEVICE — DRILL BIT TORNIER 3MM REVERSE STERILE DWD055

## (undated) RX ORDER — FENTANYL CITRATE 50 UG/ML
INJECTION, SOLUTION INTRAMUSCULAR; INTRAVENOUS
Status: DISPENSED
Start: 2019-03-14

## (undated) RX ORDER — VECURONIUM BROMIDE 1 MG/ML
INJECTION, POWDER, LYOPHILIZED, FOR SOLUTION INTRAVENOUS
Status: DISPENSED
Start: 2019-03-14

## (undated) RX ORDER — KETOROLAC TROMETHAMINE 30 MG/ML
INJECTION, SOLUTION INTRAMUSCULAR; INTRAVENOUS
Status: DISPENSED
Start: 2019-03-14

## (undated) RX ORDER — LIDOCAINE HYDROCHLORIDE 20 MG/ML
INJECTION, SOLUTION EPIDURAL; INFILTRATION; INTRACAUDAL; PERINEURAL
Status: DISPENSED
Start: 2019-03-14

## (undated) RX ORDER — HYDROMORPHONE HYDROCHLORIDE 1 MG/ML
INJECTION, SOLUTION INTRAMUSCULAR; INTRAVENOUS; SUBCUTANEOUS
Status: DISPENSED
Start: 2019-03-14

## (undated) RX ORDER — CEFAZOLIN SODIUM 1 G/3ML
INJECTION, POWDER, FOR SOLUTION INTRAMUSCULAR; INTRAVENOUS
Status: DISPENSED
Start: 2019-03-14

## (undated) RX ORDER — VANCOMYCIN HYDROCHLORIDE 1 G/20ML
INJECTION, POWDER, LYOPHILIZED, FOR SOLUTION INTRAVENOUS
Status: DISPENSED
Start: 2019-03-14

## (undated) RX ORDER — DEXAMETHASONE SODIUM PHOSPHATE 4 MG/ML
INJECTION, SOLUTION INTRA-ARTICULAR; INTRALESIONAL; INTRAMUSCULAR; INTRAVENOUS; SOFT TISSUE
Status: DISPENSED
Start: 2019-03-14

## (undated) RX ORDER — ONDANSETRON 2 MG/ML
INJECTION INTRAMUSCULAR; INTRAVENOUS
Status: DISPENSED
Start: 2019-03-14

## (undated) RX ORDER — EPINEPHRINE 1 MG/ML
INJECTION, SOLUTION INTRAMUSCULAR; SUBCUTANEOUS
Status: DISPENSED
Start: 2019-03-14

## (undated) RX ORDER — PROPOFOL 10 MG/ML
INJECTION, EMULSION INTRAVENOUS
Status: DISPENSED
Start: 2019-03-14

## (undated) RX ORDER — CEFAZOLIN SODIUM 2 G/100ML
INJECTION, SOLUTION INTRAVENOUS
Status: DISPENSED
Start: 2019-03-14

## (undated) RX ORDER — GLYCOPYRROLATE 0.2 MG/ML
INJECTION, SOLUTION INTRAMUSCULAR; INTRAVENOUS
Status: DISPENSED
Start: 2019-03-14

## (undated) RX ORDER — MORPHINE SULFATE 1 MG/ML
INJECTION, SOLUTION EPIDURAL; INTRATHECAL; INTRAVENOUS
Status: DISPENSED
Start: 2019-03-14

## (undated) RX ORDER — NEOSTIGMINE METHYLSULFATE 1 MG/ML
VIAL (ML) INJECTION
Status: DISPENSED
Start: 2019-03-14